# Patient Record
Sex: FEMALE | Race: WHITE | NOT HISPANIC OR LATINO | Employment: OTHER | URBAN - METROPOLITAN AREA
[De-identification: names, ages, dates, MRNs, and addresses within clinical notes are randomized per-mention and may not be internally consistent; named-entity substitution may affect disease eponyms.]

---

## 2017-12-31 ENCOUNTER — APPOINTMENT (OUTPATIENT)
Dept: CT IMAGING | Facility: CLINIC | Age: 72
End: 2017-12-31
Attending: EMERGENCY MEDICINE
Payer: MEDICARE

## 2017-12-31 ENCOUNTER — HOSPITAL ENCOUNTER (EMERGENCY)
Facility: CLINIC | Age: 72
Discharge: HOME | End: 2017-12-31
Attending: EMERGENCY MEDICINE | Admitting: EMERGENCY MEDICINE
Payer: MEDICARE

## 2017-12-31 ENCOUNTER — APPOINTMENT (OUTPATIENT)
Dept: GENERAL RADIOLOGY | Facility: CLINIC | Age: 72
End: 2017-12-31
Attending: EMERGENCY MEDICINE
Payer: MEDICARE

## 2017-12-31 VITALS
HEIGHT: 63 IN | OXYGEN SATURATION: 97 % | WEIGHT: 127 LBS | DIASTOLIC BLOOD PRESSURE: 71 MMHG | RESPIRATION RATE: 16 BRPM | HEART RATE: 92 BPM | TEMPERATURE: 99.1 F | SYSTOLIC BLOOD PRESSURE: 125 MMHG | BODY MASS INDEX: 22.5 KG/M2

## 2017-12-31 DIAGNOSIS — R63.0 POOR APPETITE: ICD-10-CM

## 2017-12-31 DIAGNOSIS — M62.81 GENERALIZED MUSCLE WEAKNESS: ICD-10-CM

## 2017-12-31 DIAGNOSIS — R41.0 CONFUSION: ICD-10-CM

## 2017-12-31 LAB
ALBUMIN UR-MCNC: 100 MG/DL
ANION GAP SERPL CALCULATED.3IONS-SCNC: 6 MMOL/L (ref 3–14)
APPEARANCE UR: CLEAR
BACTERIA #/AREA URNS HPF: ABNORMAL /HPF
BASOPHILS # BLD AUTO: 0 10E9/L (ref 0–0.2)
BASOPHILS NFR BLD AUTO: 0.3 %
BILIRUB UR QL STRIP: ABNORMAL
BUN SERPL-MCNC: 20 MG/DL (ref 7–30)
CALCIUM SERPL-MCNC: 9.1 MG/DL (ref 8.5–10.1)
CHLORIDE SERPL-SCNC: 101 MMOL/L (ref 94–109)
CO2 SERPL-SCNC: 28 MMOL/L (ref 20–32)
COLOR UR AUTO: YELLOW
CREAT SERPL-MCNC: 0.77 MG/DL (ref 0.52–1.04)
DIFFERENTIAL METHOD BLD: ABNORMAL
EOSINOPHIL # BLD AUTO: 0 10E9/L (ref 0–0.7)
EOSINOPHIL NFR BLD AUTO: 0.3 %
ERYTHROCYTE [DISTWIDTH] IN BLOOD BY AUTOMATED COUNT: 13.7 % (ref 10–15)
GFR SERPL CREATININE-BSD FRML MDRD: 74 ML/MIN/1.7M2
GLUCOSE SERPL-MCNC: 98 MG/DL (ref 70–99)
GLUCOSE UR STRIP-MCNC: NEGATIVE MG/DL
HCT VFR BLD AUTO: 38.1 % (ref 35–47)
HGB BLD-MCNC: 12.9 G/DL (ref 11.7–15.7)
HGB UR QL STRIP: NEGATIVE
IMM GRANULOCYTES # BLD: 0 10E9/L (ref 0–0.4)
IMM GRANULOCYTES NFR BLD: 0.6 %
INTERPRETATION ECG - MUSE: NORMAL
KETONES UR STRIP-MCNC: ABNORMAL MG/DL
LEUKOCYTE ESTERASE UR QL STRIP: NEGATIVE
LYMPHOCYTES # BLD AUTO: 0.3 10E9/L (ref 0.8–5.3)
LYMPHOCYTES NFR BLD AUTO: 8.4 %
MCH RBC QN AUTO: 30.6 PG (ref 26.5–33)
MCHC RBC AUTO-ENTMCNC: 33.9 G/DL (ref 31.5–36.5)
MCV RBC AUTO: 90 FL (ref 78–100)
MONOCYTES # BLD AUTO: 0.3 10E9/L (ref 0–1.3)
MONOCYTES NFR BLD AUTO: 7 %
NEUTROPHILS # BLD AUTO: 3 10E9/L (ref 1.6–8.3)
NEUTROPHILS NFR BLD AUTO: 83.4 %
NITRATE UR QL: NEGATIVE
NON-SQ EPI CELLS #/AREA URNS LPF: ABNORMAL /LPF
NRBC # BLD AUTO: 0 10*3/UL
NRBC BLD AUTO-RTO: 0 /100
PH UR STRIP: 6.5 PH (ref 5–7)
PLATELET # BLD AUTO: 256 10E9/L (ref 150–450)
POTASSIUM SERPL-SCNC: 4.2 MMOL/L (ref 3.4–5.3)
RBC # BLD AUTO: 4.22 10E12/L (ref 3.8–5.2)
RBC #/AREA URNS AUTO: ABNORMAL /HPF
SODIUM SERPL-SCNC: 135 MMOL/L (ref 133–144)
SOURCE: ABNORMAL
SP GR UR STRIP: 1.02 (ref 1–1.03)
TROPONIN I SERPL-MCNC: <0.015 UG/L (ref 0–0.04)
TSH SERPL DL<=0.005 MIU/L-ACNC: 2.62 MU/L (ref 0.4–4)
UROBILINOGEN UR STRIP-ACNC: >=8 EU/DL (ref 0.2–1)
WBC # BLD AUTO: 3.6 10E9/L (ref 4–11)
WBC #/AREA URNS AUTO: ABNORMAL /HPF

## 2017-12-31 PROCEDURE — 25000128 H RX IP 250 OP 636: Performed by: EMERGENCY MEDICINE

## 2017-12-31 PROCEDURE — 80048 BASIC METABOLIC PNL TOTAL CA: CPT | Performed by: EMERGENCY MEDICINE

## 2017-12-31 PROCEDURE — 99285 EMERGENCY DEPT VISIT HI MDM: CPT | Mod: 25

## 2017-12-31 PROCEDURE — 84484 ASSAY OF TROPONIN QUANT: CPT | Performed by: EMERGENCY MEDICINE

## 2017-12-31 PROCEDURE — 84443 ASSAY THYROID STIM HORMONE: CPT | Performed by: EMERGENCY MEDICINE

## 2017-12-31 PROCEDURE — 70450 CT HEAD/BRAIN W/O DYE: CPT

## 2017-12-31 PROCEDURE — 81001 URINALYSIS AUTO W/SCOPE: CPT | Performed by: EMERGENCY MEDICINE

## 2017-12-31 PROCEDURE — 71020 XR CHEST 2 VW: CPT

## 2017-12-31 PROCEDURE — 96360 HYDRATION IV INFUSION INIT: CPT

## 2017-12-31 PROCEDURE — 93005 ELECTROCARDIOGRAM TRACING: CPT

## 2017-12-31 PROCEDURE — 85025 COMPLETE CBC W/AUTO DIFF WBC: CPT | Performed by: EMERGENCY MEDICINE

## 2017-12-31 RX ADMIN — SODIUM CHLORIDE 500 ML: 9 INJECTION, SOLUTION INTRAVENOUS at 16:06

## 2017-12-31 ASSESSMENT — ENCOUNTER SYMPTOMS
NAUSEA: 0
RHINORRHEA: 0
CONSTIPATION: 1
FATIGUE: 1
BLOOD IN STOOL: 0
VOMITING: 0
NERVOUS/ANXIOUS: 1
SORE THROAT: 0
COUGH: 0
ABDOMINAL PAIN: 0
APPETITE CHANGE: 1
CONFUSION: 1

## 2017-12-31 NOTE — ED AVS SNAPSHOT
Emergency Department    [address] [place] [id number]    Phone:  [phone]    Fax:  [phone]                                       Paige Mercado   MRN: [id number]    Department:   Emergency Department   Date of Visit:  12/31/2017           After Visit Summary Signature Page     I have received my discharge instructions, and my questions have been answered. I have discussed any challenges I see with this plan with the nurse or doctor.    ..........................................................................................................................................  Patient/Patient Representative Signature      ..........................................................................................................................................  Patient Representative Print Name and Relationship to Patient    ..................................................               ................................................  Date                                            Time    ..........................................................................................................................................  Reviewed by Signature/Title    ...................................................              ..............................................  Date                                                            Time

## 2017-12-31 NOTE — DISCHARGE INSTRUCTIONS
"Please see your PCP on Tuesday.  Eat and drink normally.  Be sure to take all your medications as prescribed.    .  Coping with Memory Loss  What happens when you have memory loss?  Memory loss causes problems with thinking, learning and memory. You may feel forgetful or have trouble focusing on tasks.  Memory loss may be a side effect of medicine, chemotherapy or radiation. In these cases, problems with memory may improve after you finish your treatment. But you could have long-term problems.  Other factors that affect memory and your ability to focus include:    Aging    Illness    Depression    Hormonal changes    Anemia (low red blood cells)    Stress.  What can I do to treat or prevent memory loss?  Problems with thinking and memory can be very frustrating. There is no standard treatment at this time. But there are things you can do to reduce the effects of memory loss:    Really pay attention. Use your eyes, ears and sense of touch to remember things. Focus when someone tells you something.    Limit distractions when you need to complete tasks that require you to focus.    Tell yourself what you are doing as you do it. For example, if you're going out for a few hours, as you close the door tell yourself, \"I'm locking the door now and putting the key in my pocket so I don't have to worry about whether I locked the door.\"    Give yourself clear reminders. If you need to buy dog food, put the empty bag at the door so you'll see it as you leave the house. Or write yourself a note and put it where it's needed.    Keep things in the same place. This way you don't have to wonder where you put your keys, remote control or medicine.    Keep a journal of daily events and activities. Carry a notebook and write down important information that you want to remember.    Use helpful tools, such as:    A daily planner    A wall calendar    Checklists    Sticky notes    A  near the door    A pre-programmed phone    A " wristwatch with an alarm    A pill box to keep track of your medicines.    Get plenty of sleep and exercise each day.    Stay positive, and try to manage stress.    If you think you may be depressed, talk to your doctor. Depression should be treated.  When should I call my care team?  Call your care team if:    You feel depressed.    You cannot complete basic daily activities.  Comments:  __________________________________________  __________________________________________  __________________________________________  __________________________________________  __________________________________________  __________________________________________  __________________________________________  For informational purposes only. Not to replace the advice of your health care provider.  Copyright   2006 [facility]. All rights reserved. [employer] 470815 - REV 03/16.      Altered Level of Consciousness  Level of consciousness (LOC) is a measure of a person s ability to interact with other people and to react to the surroundings. A person with an altered level of consciousness may not respond to touch or voices. He or she may look vacant or blank and may not make eye contact with others. He or she may be limp and may not move for a long time or show little interest in moving. He or she may also be confused.  There are many causes of altered LOC. They include low blood sugar, infection, medicines, injuries, or other medical problems.  Altered LOC is a medical emergency. The healthcare provider will do tests to help find the cause. These may include blood tests and imaging tests. The person is treated so breathing and heart rate are stable. An intravenous (IV) line may be put into a vein in the arm or hand to give medicines. Once the cause of altered LOC is found, the goal is to treat the cause.  In almost all cases, the person will be admitted to the hospital for observation.  Home care  When your loved one is  released from the hospital, you will be given guidelines for caring for him or her. In general:    Follow the healthcare provider's instructions for giving any prescribed medicines to your child.    Stay with your loved one or have another responsible adult look after him or her. Watch carefully for any return of symptoms or changes in behavior.    If the person has diabetes, make sure that any approved medicines are given on time and as prescribed.  Follow-up care  Follow up with the healthcare provider or our staff.  When to seek medical advice  Call the healthcare provider right away for any of the following:    Symptoms of altered LOC return    New symptoms appear  Date Last Reviewed: 8/23/2015 2000-2017 [employer]. [address] [place] [address]. All rights reserved. This information is not intended as a substitute for professional medical care. Always follow your healthcare professional's instructions.        Navelbine  Generic Name: Vinorelbine  Drug type   Navelbine works to stop cancer cells from growing and helps get rid of the cancer.  What this drug is used for   Non-small cell lung cancer (NSCLC), breast cancer, ovarian cancer, or Hodgkin's disease.   How this drug is given   This drug is given through an IV line, a small tube inserted into a vein. Please let your nurse know right away if you feel pain, discomfort, or heat during the infusion; or if you see redness or swelling on the skin where the IV is placed.  The amount of medicine that you receive depends on many things. Your doctor will decide on the best dose for you.  Make sure your health care team knows about all the medicines and supplements you take. This will help prevent drug interactions.   Side effects   Most people do not have all the side effects listed. Side effects will usually go away after the treatment is complete. Some of the rare side effects are not listed here, so tell your doctor if you have any  unusual symptoms.  Common side effects   (occur in more than 3 out of 10 people):    Low red cells in blood (shortness of breath, feeling weak or dizzy)    Low white cells in blood (infection or fever)    Muscle weakness    Hard, dry stool (constipation)    Feeling tired    Hair loss    Throwing up (vomiting)    Feeling sick to your stomach (nausea)  Less common side effects   (occur in less than 3 out of 10 people):    Pain along the site where the medicine was given    Numbness in your fingers and toes    Loose, watery stools (diarrhea)    Low platelets in blood (bleed and bruise easily)    Chest pain  Call your doctor right away if you have:    Signs of an allergic reaction: Breathing problems, feel like your throat is closing up, swelling of the mouth, face, lips or tongue, or hives (red, itchy blotches on the skin).    100.5 F or higher or chills    Fainting or dizziness  Call your doctor within 24 hours if you have:    Loose or watery stools 4 to 6 times in a day (not controlled by medicine)    Nausea that makes it hard to eat (not helped by prescribed medicine)    Vomiting more than 5 times a day    New or worsening pain in the belly    Tiredness that prevents you from caring for yourself    Black or tarry stools or blood in your stools    Bloody or dark urine    Bleeding or bruising that happen more easily than normal    Mouth sores  Other information: __________________________  _________________________________________  _________________________________________  If you have any side effects, call us. We can help you manage these problems.  For more information, see:  www.[employer]oc[employer].com  www.medlineplus.gov/druginformation.htm  http://www.cancer.gov/cancertopics/coping/chemotherapy-and-you  For informational purposes only. Not to replace the advice of your health care provider.   Copyright   2016 [employer]. All rights reserved. [employer] [id number] - 11/16.

## 2017-12-31 NOTE — ED NOTES
Pt did not tell nurse about mastectomy prior to IV start so started on same side. When nurse saw that pt had had one, changed sides. Pt stated she forgot about it and couldn't remember which side she had it on. Limb alert band placed.

## 2017-12-31 NOTE — ED AVS SNAPSHOT
Emergency Department    [address] [place] [id number]    Phone:  [phone]    Fax:  [phone]                                       Paige Mercado   MRN: [id number]    Department:   Emergency Department   Date of Visit:  12/31/2017           Patient Information     Date Of Birth          1945        Your diagnoses for this visit were:     Generalized muscle weakness     Confusion     Poor appetite        You were seen by Melissa Beasley MD.      Follow-up Information     Follow up with Clinic, [facility]. Schedule an appointment as soon as possible for a visit in 2 days.    Contact information:    [address] [place] [id number] [phone]          Discharge Instructions       Please see your PCP on Tuesday.  Eat and drink normally.  Be sure to take all your medications as prescribed.    .  Coping with Memory Loss  What happens when you have memory loss?  Memory loss causes problems with thinking, learning and memory. You may feel forgetful or have trouble focusing on tasks.  Memory loss may be a side effect of medicine, chemotherapy or radiation. In these cases, problems with memory may improve after you finish your treatment. But you could have long-term problems.  Other factors that affect memory and your ability to focus include:    Aging    Illness    Depression    Hormonal changes    Anemia (low red blood cells)    Stress.  What can I do to treat or prevent memory loss?  Problems with thinking and memory can be very frustrating. There is no standard treatment at this time. But there are things you can do to reduce the effects of memory loss:    Really pay attention. Use your eyes, ears and sense of touch to remember things. Focus when someone tells you something.    Limit distractions when you need to complete tasks that require you to focus.    Tell yourself what you are doing as you do it. For example, if you're going out for a few hours, as you  "close the door tell yourself, \"I'm locking the door now and putting the key in my pocket so I don't have to worry about whether I locked the door.\"    Give yourself clear reminders. If you need to buy dog food, put the empty bag at the door so you'll see it as you leave the house. Or write yourself a note and put it where it's needed.    Keep things in the same place. This way you don't have to wonder where you put your keys, remote control or medicine.    Keep a journal of daily events and activities. Carry a notebook and write down important information that you want to remember.    Use helpful tools, such as:    A daily planner    A wall calendar    Checklists    Sticky notes    A  near the door    A pre-programmed phone    A wristwatch with an alarm    A pill box to keep track of your medicines.    Get plenty of sleep and exercise each day.    Stay positive, and try to manage stress.    If you think you may be depressed, talk to your doctor. Depression should be treated.  When should I call my care team?  Call your care team if:    You feel depressed.    You cannot complete basic daily activities.  Comments:  __________________________________________  __________________________________________  __________________________________________  __________________________________________  __________________________________________  __________________________________________  __________________________________________  For informational purposes only. Not to replace the advice of your health care provider.  Copyright   2006 [facility]. All rights reserved. [employer] 551490 - REV 03/16.      Altered Level of Consciousness  Level of consciousness (LOC) is a measure of a person s ability to interact with other people and to react to the surroundings. A person with an altered level of consciousness may not respond to touch or voices. He or she may look vacant or blank and may not make eye contact " with others. He or she may be limp and may not move for a long time or show little interest in moving. He or she may also be confused.  There are many causes of altered LOC. They include low blood sugar, infection, medicines, injuries, or other medical problems.  Altered LOC is a medical emergency. The healthcare provider will do tests to help find the cause. These may include blood tests and imaging tests. The person is treated so breathing and heart rate are stable. An intravenous (IV) line may be put into a vein in the arm or hand to give medicines. Once the cause of altered LOC is found, the goal is to treat the cause.  In almost all cases, the person will be admitted to the hospital for observation.  Home care  When your loved one is released from the hospital, you will be given guidelines for caring for him or her. In general:    Follow the healthcare provider's instructions for giving any prescribed medicines to your child.    Stay with your loved one or have another responsible adult look after him or her. Watch carefully for any return of symptoms or changes in behavior.    If the person has diabetes, make sure that any approved medicines are given on time and as prescribed.  Follow-up care  Follow up with the healthcare provider or our staff.  When to seek medical advice  Call the healthcare provider right away for any of the following:    Symptoms of altered LOC return    New symptoms appear  Date Last Reviewed: 8/23/2015 2000-2017 [employer]. [address] [place] [id number]. All rights reserved. This information is not intended as a substitute for professional medical care. Always follow your healthcare professional's instructions.        Navelbine  Generic Name: Vinorelbine  Drug type   Navelbine works to stop cancer cells from growing and helps get rid of the cancer.  What this drug is used for   Non-small cell lung cancer (NSCLC), breast cancer, ovarian cancer, or Hodgkin's  disease.   How this drug is given   This drug is given through an IV line, a small tube inserted into a vein. Please let your nurse know right away if you feel pain, discomfort, or heat during the infusion; or if you see redness or swelling on the skin where the IV is placed.  The amount of medicine that you receive depends on many things. Your doctor will decide on the best dose for you.  Make sure your health care team knows about all the medicines and supplements you take. This will help prevent drug interactions.   Side effects   Most people do not have all the side effects listed. Side effects will usually go away after the treatment is complete. Some of the rare side effects are not listed here, so tell your doctor if you have any unusual symptoms.  Common side effects   (occur in more than 3 out of 10 people):    Low red cells in blood (shortness of breath, feeling weak or dizzy)    Low white cells in blood (infection or fever)    Muscle weakness    Hard, dry stool (constipation)    Feeling tired    Hair loss    Throwing up (vomiting)    Feeling sick to your stomach (nausea)  Less common side effects   (occur in less than 3 out of 10 people):    Pain along the site where the medicine was given    Numbness in your fingers and toes    Loose, watery stools (diarrhea)    Low platelets in blood (bleed and bruise easily)    Chest pain  Call your doctor right away if you have:    Signs of an allergic reaction: Breathing problems, feel like your throat is closing up, swelling of the mouth, face, lips or tongue, or hives (red, itchy blotches on the skin).    100.5 F or higher or chills    Fainting or dizziness  Call your doctor within 24 hours if you have:    Loose or watery stools 4 to 6 times in a day (not controlled by medicine)    Nausea that makes it hard to eat (not helped by prescribed medicine)    Vomiting more than 5 times a day    New or worsening pain in the belly    Tiredness that prevents you from caring  for yourself    Black or tarry stools or blood in your stools    Bloody or dark urine    Bleeding or bruising that happen more easily than normal    Mouth sores  Other information: __________________________  _________________________________________  _________________________________________  If you have any side effects, call us. We can help you manage these problems.  For more information, see:  www.chemocare.com  www.medlineplus.gov/druginformation.htm  http://www.cancer.gov/cancertopics/coping/chemotherapy-and-you  For informational purposes only. Not to replace the advice of your health care provider.   Copyright   2016 [facility]. All rights reserved. [employer] 385841 - 11/16.      24 Hour Appointment Hotline       To make an appointment at any [facility] clinic, call [phone] ([phone]). If you don't have a family doctor or clinic, we will help you find one. [facility] clinics are conveniently located to serve the needs of you and your family.             Review of your medicines      Our records show that you are taking the medicines listed below. If these are incorrect, please call your family doctor or clinic.        Dose / Directions Last dose taken    ASPIRIN LOW DOSE 81 MG tablet   Dose:  1 tablet   Generic drug:  aspirin        Take 1 tablet by mouth daily.   Refills:  0        celeXA 40 MG tablet   Dose:  40 mg   Generic drug:  citalopram        Take 40 mg by mouth daily.   Refills:  0        simvastatin 20 MG tablet   Commonly known as:  ZOCOR   Dose:  20 mg        Take 20 mg by mouth At Bedtime.   Refills:  0        SYNTHROID 50 MCG tablet   Dose:  50 mcg   Generic drug:  levothyroxine        Take 50 mcg by mouth daily.   Refills:  0        vitamin D 2000 UNITS Caps        Take  by mouth.   Refills:  0                Procedures and tests performed during your visit     Basic metabolic panel    CBC with platelets differential    CT Head w/o Contrast    EKG 12-lead, tracing  only    TSH with free T4 reflex    Troponin I    UA reflex to Microscopic and Culture    Urine Microscopic    XR Chest 2 Views      Orders Needing Specimen Collection     None      Pending Results     Date and Time Order Name Status Description    12/31/2017 1532 CT Head w/o Contrast Preliminary     12/31/2017 1532 EKG 12-lead, tracing only Preliminary             Pending Culture Results     No orders found from 12/29/2017 to 1/1/2018.            Pending Results Instructions     If you had any lab results that were not finalized at the time of your Discharge, you can call the ED Lab Result RN at [phone]. You will be contacted by this team for any positive Lab results or changes in treatment. The nurses are available 7 days a week from 10A to 6:30P.  You can leave a message 24 hours per day and they will return your call.        Test Results From Your Hospital Stay        12/31/2017  4:03 PM      Component Results     Component Value Ref Range & Units Status    WBC 3.6 (L) 4.0 - 11.0 10e9/L Final    RBC Count 4.22 3.8 - 5.2 10e12/L Final    Hemoglobin 12.9 11.7 - 15.7 g/dL Final    Hematocrit 38.1 35.0 - 47.0 % Final    MCV 90 78 - 100 fl Final    MCH 30.6 26.5 - 33.0 pg Final    MCHC 33.9 31.5 - 36.5 g/dL Final    RDW 13.7 10.0 - 15.0 % Final    Platelet Count 256 150 - 450 10e9/L Final    Diff Method Automated Method  Final    % Neutrophils 83.4 % Final    % Lymphocytes 8.4 % Final    % Monocytes 7.0 % Final    % Eosinophils 0.3 % Final    % Basophils 0.3 % Final    % Immature Granulocytes 0.6 % Final    Nucleated RBCs 0 0 /100 Final    Absolute Neutrophil 3.0 1.6 - 8.3 10e9/L Final    Absolute Lymphocytes 0.3 (L) 0.8 - 5.3 10e9/L Final    Absolute Monocytes 0.3 0.0 - 1.3 10e9/L Final    Absolute Eosinophils 0.0 0.0 - 0.7 10e9/L Final    Absolute Basophils 0.0 0.0 - 0.2 10e9/L Final    Abs Immature Granulocytes 0.0 0 - 0.4 10e9/L Final    Absolute Nucleated RBC 0.0  Final         12/31/2017  4:16 PM       Component Results     Component Value Ref Range & Units Status    Sodium 135 133 - 144 mmol/L Final    Potassium 4.2 3.4 - 5.3 mmol/L Final    Chloride 101 94 - 109 mmol/L Final    Carbon Dioxide 28 20 - 32 mmol/L Final    Anion Gap 6 3 - 14 mmol/L Final    Glucose 98 70 - 99 mg/dL Final    Urea Nitrogen 20 7 - 30 mg/dL Final    Creatinine 0.77 0.52 - 1.04 mg/dL Final    GFR Estimate 74 >60 mL/min/1.7m2 Final    Non  GFR Calc    GFR Estimate If Black 89 >60 mL/min/1.7m2 Final    African American GFR Calc    Calcium 9.1 8.5 - 10.1 mg/dL Final         12/31/2017  4:22 PM      Component Results     Component Value Ref Range & Units Status    Troponin I ES <0.015 0.000 - 0.045 ug/L Final    The 99th percentile for upper reference range is 0.045 ug/L.  Troponin values   in the range of 0.045 - 0.120 ug/L may be associated with risks of adverse   clinical events.           12/31/2017  4:08 PM      Narrative     CHEST TWO VIEWS   12/31/2017 3:54 PM     HISTORY: Confusion work up.     COMPARISON: None.        Impression     IMPRESSION: Cardiomediastinal silhouette within normal limits. No  pleural effusion. No pneumothorax identified. No focal airspace  opacities. The bones are unremarkable.    KALEE MARTINEZ MD         12/31/2017  4:01 PM      Narrative     CT OF THE HEAD WITHOUT CONTRAST 12/31/2017 3:50 PM     COMPARISON: None    HISTORY: Headache, confused.     TECHNIQUE: Axial CT images of the head from the skull base to the  vertex were acquired without IV contrast.    FINDINGS: The ventricles and basal cisterns are within normal limits  in configuration. There is no midline shift. There are no extra-axial  fluid collections. Gray-white differentiation is well maintained.    No intracranial hemorrhage, mass or recent infarct.    The visualized paranasal sinuses are well-aerated. There is no  mastoiditis. There are no fractures of the visualized bones.        Impression     IMPRESSION: Normal head  CT.      Radiation dose for this scan was reduced using automated exposure  control, adjustment of the mA and/or kV according to patient size, or  iterative reconstruction technique.         12/31/2017  4:26 PM      Component Results     Component Value Ref Range & Units Status    TSH 2.62 0.40 - 4.00 mU/L Final         12/31/2017  4:03 PM      Component Results     Component Value Ref Range & Units Status    Color Urine Yellow  Final    Appearance Urine Clear  Final    Glucose Urine Negative NEG^Negative mg/dL Final    Bilirubin Urine Small (A) NEG^Negative Final    This is an unconfirmed screening test result. A positive result may be false.    Ketones Urine Trace (A) NEG^Negative mg/dL Final    Specific Gravity Urine 1.025 1.003 - 1.035 Final    Blood Urine Negative NEG^Negative Final    pH Urine 6.5 5.0 - 7.0 pH Final    Protein Albumin Urine 100 (A) NEG^Negative mg/dL Final    Urobilinogen Urine >=8.0 0.2 - 1.0 EU/dL Final    Nitrite Urine Negative NEG^Negative Final    Leukocyte Esterase Urine Negative NEG^Negative Final    Source Midstream Urine  Final         12/31/2017  4:03 PM      Component Results     Component Value Ref Range & Units Status    WBC Urine O - 2 OTO2^O - 2 /HPF Final    RBC Urine O - 2 OTO2^O - 2 /HPF Final    Squamous Epithelial /LPF Urine Few FEW^Few /LPF Final    Bacteria Urine Few (A) NEG^Negative /HPF Final                Clinical Quality Measure: Blood Pressure Screening     Your blood pressure was checked while you were in the emergency department today. The last reading we obtained was  BP: 125/71 . Please read the guidelines below about what these numbers mean and what you should do about them.  If your systolic blood pressure (the top number) is less than 120 and your diastolic blood pressure (the bottom number) is less than 80, then your blood pressure is normal. There is nothing more that you need to do about it.  If your systolic blood pressure (the top number) is 120-139 or  "your diastolic blood pressure (the bottom number) is 80-89, your blood pressure may be higher than it should be. You should have your blood pressure rechecked within a year by a primary care provider.  If your systolic blood pressure (the top number) is 140 or greater or your diastolic blood pressure (the bottom number) is 90 or greater, you may have high blood pressure. High blood pressure is treatable, but if left untreated over time it can put you at risk for heart attack, stroke, or kidney failure. You should have your blood pressure rechecked by a primary care provider within the next 4 weeks.  If your provider in the emergency department today gave you specific instructions to follow-up with your doctor or provider even sooner than that, you should follow that instruction and not wait for up to 4 weeks for your follow-up visit.        Thank you for choosing [facility]       Thank you for choosing [facility] for your care. Our goal is always to provide you with excellent care. Hearing back from our patients is one way we can continue to improve our services. Please take a few minutes to complete the written survey that you may receive in the mail after you visit with us. Thank you!        [employer] Information     [employer] lets you send messages to your doctor, view your test results, renew your prescriptions, schedule appointments and more. To sign up, go to [email or web address] . Click on \"Log in\" on the left side of the screen, which will take you to the Welcome page. Then click on \"Sign up Now\" on the right side of the page.     You will be asked to enter the access code listed below, as well as some personal information. Please follow the directions to create your username and password.     Your access code is: [email or web address]  Expires: 3/31/2018  5:19 PM     Your access code will  in 90 days. If you need help or a new code, please call your [facility] clinic or [phone].        Care EveryWhere ID     " This is your Care EveryWhere ID. This could be used by other organizations to access your [facility] medical records  [id number]        Equal Access to Services     FERNANDA GARLAND : Jenna Guajardo, kishore menchaca, valerie zamora. So [facility] [phone].    ATENCIÓN: Si habla español, tiene a eller disposición servicios gratuitos de asistencia lingüística. Llame al [phone].    We comply with applicable federal civil rights laws and [place] laws. We do not discriminate on the basis of race, color, national origin, age, disability, sex, sexual orientation, or gender identity.            After Visit Summary       This is your record. Keep this with you and show to your community pharmacist(s) and doctor(s) at your next visit.

## 2017-12-31 NOTE — ED PROVIDER NOTES
History     Chief Complaint:  Anxiety     HPI   Paige Mercado is a 72 year old female, with a history of Parkinson's, who presents with her friend to the ED for evaluation of anxiety. The patient reports she has had confusion lately; she cannot retain information and has been asking more questions. The patient notes she has been feeling fatigued with granular bowel movements. The patient's friend reports the patient had a UTI 6 months ago which caused cognitive impairments, anxiety, and 40lbs weight loss; the patient's cognition has been improving the last 5 months, recovering about 75%. The friend notes the patient has been having increased anxiety, cognitive impairments, and decreased appetite again the last 2 days; the patient is anxious about her UTI which she has tried 4 different medications for. The patient denies any rhinorrhea, sore throat, cough, nausea, vomiting, abdominal pain, black/tarry stool, blood in stool, suicidal ideation, or homicidal ideation.    Allergies:  Ciprofloxacin  Sulfa drugs      Medications:    Levothyroxine  Aspirin 81mg  Vitamin D  Simvastatin  Celexa  Carbidopa/Levodopa   Nitrofurantoin     Past Medical History:    Parkinson's   HLD  Depression   Thyroid cancer     Past Surgical History:    Hysterectomy     Family History:    History reviewed. No pertinent family history.     Social History:  Smoking status: Never smoker  Alcohol use: No  Presents to ED with friend   Marital Status:  Unknown [6]     Review of Systems   Constitutional: Positive for appetite change and fatigue.   HENT: Negative for rhinorrhea and sore throat.    Respiratory: Negative for cough.    Gastrointestinal: Positive for constipation. Negative for abdominal pain, blood in stool, nausea and vomiting.   Psychiatric/Behavioral: Positive for confusion. Negative for suicidal ideas. The patient is nervous/anxious.    All other systems reviewed and are negative.    Physical Exam     Patient Vitals for the past 24  "hrs:   BP Temp Temp src Pulse Resp SpO2 Height Weight   12/31/17 1700 125/71 - - 92 - 97 % - -   12/31/17 1630 131/84 - - 89 16 99 % - -   12/31/17 1607 - - - - - 97 % - -   12/31/17 1606 123/72 - - - - - - -   12/31/17 1538 109/61 - - 91 16 - - -   12/31/17 1400 108/52 99.1  F (37.3  C) Temporal 102 16 97 % 1.6 m (5' 3\") 57.6 kg (127 lb)     Physical Exam  Physical Exam   General: Resting on the bed.  Head: No obvious trauma to head.  Ears, Nose, Throat:  External ears normal.  Nose normal.    Eyes:  Conjunctivae clear.  Pupils are equal, round, and reactive.   Neck: Normal range of motion.  Neck supple.   CV: Regular rate and rhythm.  No murmurs.      Respiratory: Effort normal and breath sounds normal.  No wheezing or crackles.   Gastrointestinal: Soft.  No distension. There is no tenderness.    Neuro: Alert. Moving all extremities appropriately.  Normal speech.  Slow gait but normal.  CN II-XII grossly intact, no pronator drift, normal finger-nose-finger.  Gross muscle strength intact of the proximal and distal bilateral upper and lower extremities.  Sensation intact to light touch in all 4 extremities.   Skin: Skin is warm and dry.  No rash noted.   Psych: Normal mood and affect. Behavior is normal.     Emergency Department Course     ECG (15:37:31):  Rate 93 bpm. WI interval 150. QRS duration 82. QT/QTc 382/474. P-R-T axes 32 -14 42. Normal sinus rhythm. Possible anterior infarct, age undetermined. Abnormal ECG. Interpreted at 1541 by Melissa Beasley MD.    Imaging:  Radiographic findings were communicated with the patient and friend who voiced understanding of the findings.    CT Head w/o Contrast  IMPRESSION: Normal head CT. As read by Radiology.     XR Chest 2 Views  IMPRESSION: Cardiomediastinal silhouette within normal limits. No  pleural effusion. No pneumothorax identified. No focal airspace  opacities. The bones are unremarkable. As read by Radiology.     Laboratory:  UA: Urinebilin small, " Urineketon trace, Protein albumin 100  Urine: Bacteria few  TSH: 2.62  Troponin I (1530): <0.015  CBC: WBC 3.6(L) o/w WNL (HGB 12.9, PLT 1256)  BMP: o/w WNL (Creatinine 0.77)    Interventions:  1606: NS 500mLs Bolus IV     Emergency Department Course:  Past medical records, nursing notes, and vitals reviewed.  1516: I performed an exam of the patient and obtained history, as documented above.  IV inserted and blood drawn.    The patient was sent for a head CT and chest x-ray while in the emergency department, findings above.    1704: I rechecked the patient. Findings and plan explained to the Patient and friend. Patient discharged home with instructions regarding supportive care, medications, and reasons to return. The importance of close follow-up was reviewed.     Impression & Plan      Medical Decision Making:  Paige Mercado is a 72 year old female with history of Parkinson's and dementia, presenting with transient confusion. Vital sings unremarkable. Broad differential pursued including but not limited to, intracranial hemorrhage, electrolyte or metabolic abnormality, infection, dehydration, hyper/hypothyroidism, etc. Overall, patient is well-appearing, non-toxic. She has a completely non-focal neurologic exam. She's able to ambulate slowly at baseline. Head CT is obtained showing no acute intracranial process or hemorrhage. Chest x-ray negative for any acute pneumonia, pneumothorax, or other acute abnormalities. CBC shows mild leukopenia 3.6, but remainder unremarkable and no evidence of anemia. BMP without any acute electrolyte, metabolic, or renal abnormalities. EKG was obtained and shows sinus rhythm, no acute ST/T wave changes, troponin is normal. Overall, very low concern for acute coronary syndrome. TSH is within normal range, not concerning for hyper/hypothyroidism. Patient's urine sample is bland, no evidence of infection on today's examination. She is on antibiotics at present. Advised continuation of  this. It appears that she had very poor PO intake previously. She has not been eating or drinking normally. Suspect this likely contributed to today's presentation. She is at baseline currently. There's no signs of stroke or TIA during this confusion. She has no other focal or reversible signs of confusion at this time. She feels well and would like to go home. This appears to be appropriate. Advised very close follow-up. Her friend and daughter will watch her closely. She's discharged in stable condition.     Diagnosis:    ICD-10-CM   1. Generalized muscle weakness M62.81   2. Confusion R41.0   3. Poor appetite R63.0     Disposition: Patient discharged to home with friend     Lesley Soni  12/31/2017    EMERGENCY DEPARTMENT    I, Lesley Soni, am serving as a scribe at 3:16 PM on 12/31/2017 to document services personally performed by Melissa Beasley MD based on my observations and the provider's statements to me.        Melissa Beasley MD  12/31/17 2015

## 2018-01-02 NOTE — TELEPHONE ENCOUNTER
APPT INFO    Date /Time: 3/15/18   Reason for Appt: Parkinsons   Ref Provider/Clinic: Self   Are there internal records? Yes/No?  IF YES, list clinic names: No   Are there outside records? Yes/No? Yes;  See Below   Patient Contact (Y/N) & Call Details: Yes, transfer from call center   Action: Mailed YADY to pt home for [facility] for records     OUTSIDE RECORDS CHECKLIST     CLINIC NAME COMMENTS REC (x) IMG (x)   [facility]  (CARE EVERYWHERE)  OV NOTES:    - 2012 through 2017    - 2012 Speech Therapy/ OT X NONE

## 2018-01-03 ENCOUNTER — PRE VISIT (OUTPATIENT)
Dept: UROLOGY | Facility: CLINIC | Age: 73
End: 2018-01-03

## 2018-01-24 ENCOUNTER — AMBULATORY - HEALTHEAST (OUTPATIENT)
Dept: NEUROLOGY | Facility: CLINIC | Age: 73
End: 2018-01-24

## 2018-01-24 DIAGNOSIS — G20.A1 PD (PARKINSON'S DISEASE) (H): ICD-10-CM

## 2018-01-30 ENCOUNTER — PRE VISIT (OUTPATIENT)
Dept: UROLOGY | Facility: CLINIC | Age: 73
End: 2018-01-30

## 2018-01-30 NOTE — TELEPHONE ENCOUNTER
Patient coming in for a consult for frequent UTI's. Chart reviewed and some cultures are available. Pt will need a catheterized pvr/dip.

## 2018-02-08 ENCOUNTER — OFFICE VISIT (OUTPATIENT)
Dept: UROLOGY | Facility: CLINIC | Age: 73
End: 2018-02-08
Payer: COMMERCIAL

## 2018-02-08 VITALS
DIASTOLIC BLOOD PRESSURE: 91 MMHG | WEIGHT: 131.9 LBS | SYSTOLIC BLOOD PRESSURE: 150 MMHG | BODY MASS INDEX: 23.37 KG/M2 | HEART RATE: 93 BPM | HEIGHT: 63 IN

## 2018-02-08 DIAGNOSIS — N39.0 RECURRENT UTI: ICD-10-CM

## 2018-02-08 DIAGNOSIS — G20.A1 PARKINSON'S DISEASE (H): ICD-10-CM

## 2018-02-08 DIAGNOSIS — N39.0 RECURRENT UTI: Primary | ICD-10-CM

## 2018-02-08 LAB
ANION GAP SERPL CALCULATED.3IONS-SCNC: 5 MMOL/L (ref 3–14)
APPEARANCE UR: CLEAR
BILIRUB UR QL: NORMAL
BUN SERPL-MCNC: 14 MG/DL (ref 7–30)
CALCIUM SERPL-MCNC: 8.9 MG/DL (ref 8.5–10.1)
CHLORIDE SERPL-SCNC: 104 MMOL/L (ref 94–109)
CO2 SERPL-SCNC: 29 MMOL/L (ref 20–32)
COLOR UR: YELLOW
CREAT SERPL-MCNC: 0.61 MG/DL (ref 0.52–1.04)
GFR SERPL CREATININE-BSD FRML MDRD: >90 ML/MIN/1.7M2
GLUCOSE SERPL-MCNC: 88 MG/DL (ref 70–99)
GLUCOSE URINE: NORMAL MG/DL
HGB UR QL: NORMAL
KETONES UR QL: NORMAL MG/DL
LEUKOCYTE ESTERASE URINE: NORMAL
NITRITE UR QL STRIP: NORMAL
PH UR STRIP: 7 PH (ref 5–7)
POTASSIUM SERPL-SCNC: 3.9 MMOL/L (ref 3.4–5.3)
PROTEIN ALBUMIN URINE: NORMAL MG/DL
SODIUM SERPL-SCNC: 137 MMOL/L (ref 133–144)
SOURCE: NORMAL
SP GR UR STRIP: 1.01 (ref 1–1.03)
UROBILINOGEN UR QL STRIP: 0.2 EU/DL (ref 0.2–1)

## 2018-02-08 RX ORDER — QUETIAPINE FUMARATE 100 MG/1
100 TABLET, FILM COATED ORAL
COMMUNITY
Start: 2017-12-12 | End: 2018-03-11

## 2018-02-08 RX ORDER — POLYETHYLENE GLYCOL 3350 17 G/17G
17 POWDER, FOR SOLUTION ORAL
COMMUNITY
End: 2018-03-15

## 2018-02-08 RX ORDER — ACETAMINOPHEN 500 MG
500 TABLET ORAL
COMMUNITY
End: 2018-03-15

## 2018-02-08 RX ORDER — CARBIDOPA AND LEVODOPA 25; 100 MG/1; MG/1
1 TABLET, EXTENDED RELEASE ORAL
COMMUNITY
Start: 2016-02-10 | End: 2018-03-15

## 2018-02-08 RX ORDER — QUETIAPINE FUMARATE 25 MG/1
TABLET, FILM COATED ORAL
COMMUNITY
Start: 2018-01-16 | End: 2018-03-15

## 2018-02-08 RX ORDER — ROPINIROLE 1 MG/1
1 TABLET, FILM COATED ORAL
COMMUNITY
Start: 2017-03-07 | End: 2018-03-11

## 2018-02-08 RX ORDER — LISINOPRIL 2.5 MG/1
2.5 TABLET ORAL
COMMUNITY
Start: 2017-11-28 | End: 2018-03-15

## 2018-02-08 RX ORDER — SUMATRIPTAN 25 MG/1
25 TABLET, FILM COATED ORAL
COMMUNITY
Start: 2013-05-09 | End: 2018-03-15

## 2018-02-08 ASSESSMENT — PAIN SCALES - GENERAL: PAINLEVEL: NO PAIN (0)

## 2018-02-08 NOTE — LETTER
2/8/2018       RE: Paige Mercado  [address] [address]     Dear Colleague,    Thank you for referring your patient, Paige Mercado, to the [facility] UROLOGY AND INST FOR PROSTATE AND UROLOGIC CANCERS at [facility] Please see a copy of my visit note below.    February 8, 2018    Referring Provider: Martha Christie MD    Primary Care Provider: Clinic, [facility]    CC: Recurrent UTI    HPI:  Paige Mercado is a 72 year old female who presents for evaluation of her pelvic floor symptoms.  Patient is accompanied by her friend Ms Lujan who is also a patient of mine.  She brings her friend in today to discuss Ricco.    She has an history of Parkinson's disease and has had some episodes of UTI and confusion.  With each infection she seems to continue to have some cognitive decline and now some significant anxiety about this.   Ms Lujan brings her friend in here today for further evaluation to see if we can help prevent UTI    She has had E coli in her bladder 5/2017, 12/18/2017 and 12/26/2017    Hysterectomy for fibroids, history of breast cancer    Past Medical History:   Diagnosis Date     Cancer (H)     breast     Past Surgical History:   Procedure Laterality Date     GYN SURGERY      hysterectomy     Social History     Social History     Marital status: Unknown     Spouse name: N/A     Number of children: N/A     Years of education: N/A     Occupational History     Not on file.     Social History Main Topics     Smoking status: Never Smoker     Smokeless tobacco: Never Used     Alcohol use No     Drug use: No     Sexual activity: Not on file     Other Topics Concern     Not on file     Social History Narrative     No family history on file.    ROS    Allergies   Allergen Reactions     Ciprofloxacin GI Disturbance     Sulfa Drugs GI Disturbance     Current Outpatient Prescriptions   Medication     carbidopa-levodopa (SINEMET CR)  MG per CR  "tablet     lisinopril (PRINIVIL/ZESTRIL) 2.5 MG tablet     QUEtiapine (SEROQUEL) 100 MG tablet     QUEtiapine (SEROQUEL) 25 MG tablet     rOPINIRole (REQUIP) 1 MG tablet     SUMAtriptan (IMITREX) 25 MG tablet     acetaminophen (TYLENOL) 500 MG tablet     polyethylene glycol (MIRALAX/GLYCOLAX) Packet     levothyroxine (SYNTHROID) 50 MCG tablet     aspirin (ASPIRIN LOW DOSE) 81 MG tablet     Cholecalciferol (VITAMIN D) 2000 UNIT CAPS     simvastatin (ZOCOR) 20 MG tablet     citalopram (CELEXA) 40 MG tablet     No current facility-administered medications for this visit.      BP (!) 150/91  Pulse 93  Ht 1.6 m (5' 3\")  Wt 59.8 kg (131 lb 14.4 oz)  BMI 23.37 kg/m2 No LMP recorded. Patient has had a hysterectomy. Body mass index is 23.37 kg/(m^2).  She is alert, comfortable in no acute distress, non-labored breathing.  Exam deferred until time of cystoscopy  Urine dip negative    PVR 15 mL by bladder scan    A/P: Paige Mercado is a 72 year old F with Parkinson's disease, history of cognitive decline, Ricco    Discussed evaluation to include CT scan and return for cystoscopy, pelvic exam    Discussed available supplements    Discussed the importance of contacting our clinic for urine specimen if concern for infection, preferably for CATHETERIZED urine    RTC for cystoscopy after CT scan complete    30 minutes were spent with the patient today, > 50% in counseling and coordination of care    Alyson Mcgee MD MPH    Urology    CC  Patient Care Team:  [facility], [facility] as PCP - General  Everardo, Alyson Mariee MD as MD (Urology)  Trixie Aldridge, RN as Registered Nurse (Urology)  SELF, REFERRED    "

## 2018-02-08 NOTE — NURSING NOTE
"Chief Complaint   Patient presents with     Consult     Frequent UTI's       Blood pressure (!) 150/91, pulse 93, height 1.6 m (5' 3\"), weight 59.8 kg (131 lb 14.4 oz). Body mass index is 23.37 kg/(m^2).    There is no problem list on file for this patient.      Allergies   Allergen Reactions     Ciprofloxacin GI Disturbance     Sulfa Drugs GI Disturbance       Current Outpatient Prescriptions   Medication Sig Dispense Refill     carbidopa-levodopa (SINEMET CR)  MG per CR tablet Take 1 tablet by mouth       lisinopril (PRINIVIL/ZESTRIL) 2.5 MG tablet Take 2.5 mg by mouth       QUEtiapine (SEROQUEL) 100 MG tablet Take 100 mg by mouth       QUEtiapine (SEROQUEL) 25 MG tablet Take 1 tabs in the morning and 1 tab at bedtime. (takes with a 100 mg tab at hs). DOSE INCREASE.       rOPINIRole (REQUIP) 1 MG tablet Take 1 mg by mouth       SUMAtriptan (IMITREX) 25 MG tablet Take 25 mg by mouth       acetaminophen (TYLENOL) 500 MG tablet Take 500 mg by mouth       polyethylene glycol (MIRALAX/GLYCOLAX) Packet Take 17 g by mouth       levothyroxine (SYNTHROID) 50 MCG tablet Take 50 mcg by mouth daily.       aspirin (ASPIRIN LOW DOSE) 81 MG tablet Take 1 tablet by mouth daily.       Cholecalciferol (VITAMIN D) 2000 UNIT CAPS Take  by mouth.       simvastatin (ZOCOR) 20 MG tablet Take 20 mg by mouth At Bedtime.       citalopram (CELEXA) 40 MG tablet Take 40 mg by mouth daily.         Social History   Substance Use Topics     Smoking status: Never Smoker     Smokeless tobacco: Never Used     Alcohol use No       PAOLA Lovett  2/8/2018  2:50 PM       "

## 2018-02-08 NOTE — PATIENT INSTRUCTIONS
Please do the blood work and CT scan    Please return to see me for a cystoscopy (procedure to look in the bladder) and pelvic exam    Please come in one week before the cystoscopy for a nurse visit for a CATHETERIZED urine specimen    Supplements to prevent UTI  -probiotics  -cranberry: Ellura, Theracran  -d-mannose    If you think you have a urine infection please contact us [phone] to talk to a nurse about coming in for a catheterized specimen    It was a pleasure meeting with you today.  Thank you for allowing me and my team the privilege of caring for you today.  YOU are the reason we are here, and I truly hope we provided you with the excellent service you deserve.  Please let us know if there is anything else we can do for you so that we can be sure you are leaving completely satisfied with your care experience.

## 2018-02-08 NOTE — MR AVS SNAPSHOT
After Visit Summary   2/8/2018    Paige Mercado    MRN: [id number]           Patient Information     Date Of Birth          1945        Visit Information        Provider Department      2/8/2018 2:15 PM Alyson Mcgee MD [facility] Urology and [facility] for Prostate and Urologic Cancers        Today's Diagnoses     Recurrent UTI    -  1      Care Instructions    Please do the blood work and CT scan    Please return to see me for a cystoscopy (procedure to look in the bladder) and pelvic exam    Please come in one week before the cystoscopy for a nurse visit for a CATHETERIZED urine specimen    Supplements to prevent UTI  -probiotics  -cranberry: Ellura, Theracran  -d-mannose    If you think you have a urine infection please contact us [phone] to talk to a nurse about coming in for a catheterized specimen    It was a pleasure meeting with you today.  Thank you for allowing me and my team the privilege of caring for you today.  YOU are the reason we are here, and I truly hope we provided you with the excellent service you deserve.  Please let us know if there is anything else we can do for you so that we can be sure you are leaving completely satisfied with your care experience.            Follow-ups after your visit        Your next 10 appointments already scheduled     Feb 08, 2018  4:00 PM CST   LAB with  LAB   [facility] ([facility])    [address] [place] [id number] [phone]           Please do not eat 10-12 hours before your appointment if you are coming in fasting for labs on lipids, cholesterol, or glucose (sugar). This does not apply to pregnant women. Water, hot tea and black coffee (with nothing added) are okay. Do not drink other fluids, diet soda or chew gum.            Feb 13, 2018 11:20 AM CST   (Arrive by 11:05 AM)   CT ABDOMEN PELVIS W/O & W CONTRAST with UCCT1   [facility] CT ([facility])    [address] [place] [id number] [phone]           Please bring any scans or X-rays taken at other hospitals, if similar tests were done. Also bring a list of your medicines, including vitamins, minerals and over-the-counter drugs. It is safest to leave personal items at home.  Be sure to tell your doctor:   If you have any allergies.   If there s any chance you are pregnant.   If you are breastfeeding.   If you have any special needs.  You may have contrast for this exam. To prepare:   Do not eat or drink for 2 hours before your exam. If you need to take medicine, you may take it with small sips of water. (We may ask you to take liquid medicine as well.)   The day before your exam, drink extra fluids at least six 8-ounce glasses (unless your doctor tells you to restrict your fluids).  Patients over 70 or patients with diabetes or kidney problems:   If you haven t had a blood test (creatinine test) within the last 30 days, go to your clinic or Diagnostic Imaging Department for this test.  If you have diabetes:   If your kidney function is normal, continue taking your metformin (Avandamet, Glucophage, Glucovance, Metaglip) on the day of your exam.   If your kidney function is abnormal, wait 48 hours before restarting this medicine.  You will have oral contrast for this exam:   You will drink the contrast at home. Get this from your clinic or Diagnostic Imaging Department. Please follow the directions given.  Please wear loose clothing, such as a sweat suit or jogging clothes. Avoid snaps, zippers and other metal. We may ask you to undress and put on a hospital gown.  If you have any questions, please call the Imaging Department where you will have your exam.            Mar 15, 2018 11:10 AM CDT   (Arrive by 10:55 AM)   New Movement Disorder with Keith Styles MD   [facility] Neurology ([facility])    [address] [place] [address]  [facility] [address]   [phone]            Mar 19, 2018  1:40 PM CDT   (Arrive by 1:25 PM)   Return Visit with  Prostate Cancer Ctr Nurse   [facility] Urology and [facility] for Prostate and Urologic Cancers ([facility])    [address] [place] [address]   [phone]            Mar 22, 2018  1:00 PM CDT   (Arrive by 12:45 PM)   Cystoscopy with Alyson Mcgee MD   [facility] and [facility] for Prostate and Urologic Cancers ([facility])    [address] [place] [address]   [phone]              Future tests that were ordered for you today     Open Future Orders        Priority Expected Expires Ordered    Basic metabolic panel Routine  2019    CT Urogram Routine  2019            Who to contact     Please call your clinic at [phone] to:    Ask questions about your health    Make or cancel appointments    Discuss your medicines    Learn about your test results    Speak to your doctor            Additional Information About Your Visit        PresenterNetharZivity Information     Plyce is an electronic gateway that provides easy, online access to your medical records. With Plyce, you can request a clinic appointment, read your test results, renew a prescription or communicate with your care team.     To sign up for Plyce visit the website at [email or web address]   You will be asked to enter the access code listed below, as well as some personal information. Please follow the directions to create your username and password.     Your access code is: [email or web address]  Expires: 3/31/2018  5:19 PM     Your access code will  in 90 days. If you need help or a new code, please contact your [facility] or call [phone] for assistance.        Care EveryWhere ID     This is your Care EveryWhere ID. This could be used by other organizations to  "access your [place] medical records  [id number]        Your Vitals Were     Pulse Height BMI (Body Mass Index)             93 1.6 m (5' 3\") 23.37 kg/m2          Blood Pressure from Last 3 Encounters:   02/08/18 (!) 150/91   12/31/17 125/71   04/26/12 140/78    Weight from Last 3 Encounters:   02/08/18 59.8 kg (131 lb 14.4 oz)   12/31/17 57.6 kg (127 lb)              We Performed the Following     Urinalysis chemical screen POCT        Primary Care Provider Office Phone # Fax #    [facility] [address] [phone]       [facility] [address] [address]        Equal Access to Services     FERNANDA GARLAND : Hadii aad ku hadasho Soomaali, waaxda luqadaha, qaybta kaalmada adeegyada, valerie lange. So [facility] [phone].    ATENCIÓN: Si habla español, tiene a eller disposición servicios gratuitos de asistencia lingüística. [facility] [phone].    We comply with applicable federal civil rights laws and [place] laws. We do not discriminate on the basis of race, color, national origin, age, disability, sex, sexual orientation, or gender identity.            Thank you!     Thank you for choosing [facility] UROLOGY AND [facility] FOR PROSTATE AND UROLOGIC CANCERS  for your care. Our goal is always to provide you with excellent care. Hearing back from our patients is one way we can continue to improve our services. Please take a few minutes to complete the written survey that you may receive in the mail after your visit with us. Thank you!             Your Updated Medication List - Protect others around you: Learn how to safely use, store and throw away your medicines at www.disposemymeds.org.          This list is accurate as of 2/8/18  3:41 PM.  Always use your most recent med list.                   Brand Name Dispense Instructions for use Diagnosis    acetaminophen 500 MG tablet    TYLENOL     Take 500 mg by mouth        ASPIRIN LOW DOSE 81 MG tablet   Generic drug:  aspirin "      Take 1 tablet by mouth daily.        carbidopa-levodopa  MG per CR tablet    SINEMET CR     Take 1 tablet by mouth        celeXA 40 MG tablet   Generic drug:  citalopram      Take 40 mg by mouth daily.        lisinopril 2.5 MG tablet    PRINIVIL/Zestril     Take 2.5 mg by mouth        polyethylene glycol Packet    MIRALAX/GLYCOLAX     Take 17 g by mouth        * QUEtiapine 100 MG tablet    SEROquel     Take 100 mg by mouth        * QUEtiapine 25 MG tablet    SEROquel     Take 1 tabs in the morning and 1 tab at bedtime. (takes with a 100 mg tab at hs). DOSE INCREASE.        rOPINIRole 1 MG tablet    REQUIP     Take 1 mg by mouth        simvastatin 20 MG tablet    ZOCOR     Take 20 mg by mouth At Bedtime.        SUMAtriptan 25 MG tablet    IMITREX     Take 25 mg by mouth        SYNTHROID 50 MCG tablet   Generic drug:  levothyroxine      Take 50 mcg by mouth daily.        vitamin D 2000 UNITS Caps      Take  by mouth.        * Notice:  This list has 2 medication(s) that are the same as other medications prescribed for you. Read the directions carefully, and ask your doctor or other care provider to review them with you.

## 2018-02-08 NOTE — PROGRESS NOTES
February 8, 2018    Referring Provider: Martha Christie MD    Primary Care Provider: Clinic, [facility] [place]    CC: Recurrent UTI    HPI:  Paige Mercado is a 72 year old female who presents for evaluation of her pelvic floor symptoms.  Patient is accompanied by her friend Ms Lujan who is also a patient of mine.  She brings her friend in today to discuss Ricco.    She has an history of Parkinson's disease and has had some episodes of UTI and confusion.  With each infection she seems to continue to have some cognitive decline and now some significant anxiety about this.   Ms Lujan brings her friend in here today for further evaluation to see if we can help prevent UTI    She has had E coli in her bladder 5/2017, 12/18/2017 and 12/26/2017    Hysterectomy for fibroids, history of breast cancer    Past Medical History:   Diagnosis Date     Cancer (H)     breast     Past Surgical History:   Procedure Laterality Date     GYN SURGERY      hysterectomy     Social History     Social History     Marital status: Unknown     Spouse name: N/A     Number of children: N/A     Years of education: N/A     Occupational History     Not on file.     Social History Main Topics     Smoking status: Never Smoker     Smokeless tobacco: Never Used     Alcohol use No     Drug use: No     Sexual activity: Not on file     Other Topics Concern     Not on file     Social History Narrative     No family history on file.    ROS    Allergies   Allergen Reactions     Ciprofloxacin GI Disturbance     Sulfa Drugs GI Disturbance     Current Outpatient Prescriptions   Medication     carbidopa-levodopa (SINEMET CR)  MG per CR tablet     lisinopril (PRINIVIL/ZESTRIL) 2.5 MG tablet     QUEtiapine (SEROQUEL) 100 MG tablet     QUEtiapine (SEROQUEL) 25 MG tablet     rOPINIRole (REQUIP) 1 MG tablet     SUMAtriptan (IMITREX) 25 MG tablet     acetaminophen (TYLENOL) 500 MG tablet     polyethylene glycol (MIRALAX/GLYCOLAX) Packet     levothyroxine  "(SYNTHROID) 50 MCG tablet     aspirin (ASPIRIN LOW DOSE) 81 MG tablet     Cholecalciferol (VITAMIN D) 2000 UNIT CAPS     simvastatin (ZOCOR) 20 MG tablet     citalopram (CELEXA) 40 MG tablet     No current facility-administered medications for this visit.      BP (!) 150/91  Pulse 93  Ht 1.6 m (5' 3\")  Wt 59.8 kg (131 lb 14.4 oz)  BMI 23.37 kg/m2 No LMP recorded. Patient has had a hysterectomy. Body mass index is 23.37 kg/(m^2).  She is alert, comfortable in no acute distress, non-labored breathing.  Exam deferred until time of cystoscopy  Urine dip negative    PVR 15 mL by bladder scan    A/P: Paige Mercado is a 72 year old F with Parkinson's disease, history of cognitive decline, Ricco    Discussed evaluation to include CT scan and return for cystoscopy, pelvic exam    Discussed available supplements    Discussed the importance of contacting our clinic for urine specimen if concern for infection, preferably for CATHETERIZED urine    RTC for cystoscopy after CT scan complete    30 minutes were spent with the patient today, > 50% in counseling and coordination of care    Alyson Mcgee MD MPH    Urology    CC  Patient Care Team:  [facility], [facility] [place] as PCP - General  Everardo, Alyson Mariee MD as MD (Urology)  Trixie Aldridge, RN as Registered Nurse (Urology)  SELF, REFERRED              "

## 2018-02-13 ENCOUNTER — RADIANT APPOINTMENT (OUTPATIENT)
Dept: CT IMAGING | Facility: CLINIC | Age: 73
End: 2018-02-13
Attending: UROLOGY
Payer: COMMERCIAL

## 2018-02-13 DIAGNOSIS — N39.0 RECURRENT UTI: ICD-10-CM

## 2018-02-13 RX ORDER — IOPAMIDOL 755 MG/ML
80 INJECTION, SOLUTION INTRAVASCULAR ONCE
Status: COMPLETED | OUTPATIENT
Start: 2018-02-13 | End: 2018-02-13

## 2018-02-13 RX ADMIN — IOPAMIDOL 80 ML: 755 INJECTION, SOLUTION INTRAVASCULAR at 11:13

## 2018-02-13 NOTE — DISCHARGE INSTRUCTIONS

## 2018-02-27 ENCOUNTER — HOSPITAL ENCOUNTER (OUTPATIENT)
Dept: PHYSICAL THERAPY | Age: 73
Setting detail: THERAPIES SERIES
Discharge: STILL ADMITTED OR ADMINISTRATIVE | End: 2018-02-27
Attending: PHYSICAL THERAPIST

## 2018-02-27 DIAGNOSIS — R29.3 POSTURAL INSTABILITY: ICD-10-CM

## 2018-02-27 DIAGNOSIS — R26.81 GAIT INSTABILITY: ICD-10-CM

## 2018-02-28 PROBLEM — Z98.890 S/P COLONOSCOPY: Status: ACTIVE | Noted: 2018-02-28

## 2018-02-28 PROBLEM — K31.7 HYPERPLASTIC POLYPS OF STOMACH: Status: ACTIVE | Noted: 2018-02-28

## 2018-02-28 PROBLEM — Z92.89 H/O BONE DENSITY STUDY: Status: ACTIVE | Noted: 2018-02-28

## 2018-02-28 PROBLEM — R73.03 PREDIABETES: Status: ACTIVE | Noted: 2018-02-28

## 2018-02-28 PROBLEM — F41.9 ANXIETY: Status: ACTIVE | Noted: 2018-02-28

## 2018-02-28 PROBLEM — G20.A1 PARKINSON DISEASE (H): Status: ACTIVE | Noted: 2018-02-28

## 2018-03-11 RX ORDER — QUETIAPINE FUMARATE 25 MG/1
TABLET, FILM COATED ORAL
COMMUNITY
Start: 2018-02-28 | End: 2018-03-15

## 2018-03-11 RX ORDER — ROPINIROLE 1 MG/1
1 TABLET, FILM COATED ORAL 3 TIMES DAILY
COMMUNITY
Start: 2018-03-02 | End: 2018-03-15

## 2018-03-11 RX ORDER — QUETIAPINE FUMARATE 100 MG/1
100 TABLET, FILM COATED ORAL AT BEDTIME
Qty: 60 TABLET | COMMUNITY
Start: 2018-03-11 | End: 2018-03-15

## 2018-03-11 NOTE — PROGRESS NOTES
Summary and Recommendations:     Parkinsonism           - discussed to make sure that her sinemet medication should be one hour before protein or two hours             after protein            - may need as needed sinemet for tremor  Mood disorder - anxiety she is relatively well managed with citalopram 40mg/day and seroquel 25mg in the morning and 125mg in the evening. She has not been on remeron or pimavanserin (nuplazid)  Driving issue  Cognitive issues    - neuropsychological evaluation was ordered but she may not proceed wit th is              - she has not had a formal moca   - she and her daughter was informed about the rivastigmine/exelon patch   Constipation -               - need to be more aggressive daily regimen - miralax or/and senokot as well as diet    Briefly discussed new treatments  rytary   nuplazid    Educational program  1st Saturday in June    She could meet with a pharmacist that works here - Zainab Sanches, Pharm D    She is doing an exercise program - she is walking in the summer and walks in the dome in [facility] [employer]  She does go to the parkinson Classes.     Signed up for dimple     Return to see Alyssa Santos in 3-4 months  May need to call daughter at the time of the visit to help with discussion of treatment  We did this today and it worked well as daughter is home school ing 2/5 kids.        Medications     7am 11am 3pm 8pm 10pm     Acetaminophen 500mg prn             Aspirin 81mg 1             Carbidopa/levodopa 25/100 Sinemet  2 2 2 2       Cholecalciferol vitamin d2000 1             Citalopram celexa 40mg 1             Levothyroxine synthroid 50 mcg 1             Lisinopril prinivil/zestril 2.5mg         1     Polyethylene glycol miralax As needed             Quetiapine 100mg seroquel         1     Quetiapine 25mg seroquel 1       1     Ropinirole requip 1mg 1 1 1         Simvastatin zocor 20mg         1     Sumatriptan imitrex 25mg As needed                                                     Keith Styles MD  _____________________________________________________________________  PATIENT: Paige Mercado  72 year old female   : 1945  VLADIMIR: March 15, 2018    Consult requested by pcp/other  Outside records reviewed and revealed  - inserted.   History obtained from patient  History of Present Illness  71 yo woman initially seen by Dr. Palm in  and most recently in 2017 - details from two visits at end of note.  Diagnosed in   It was 2012    She was referred by another patient RENA Fenton  She needs to take a test about driving issue at [facility]  May be seeing an OT at [facility] for an evaluation    She has not had falls  She has not had hallucinations  She has done physical therapy at [facility]  Wears glasses. She has signed up may 1, 2018 right and then left cataract surgery.  Denies hearing loss  Has some constipation and uses miralax as needed and may have one bowel movement per week  Has bladder issues  -  She is not wearing a pad. She has urgency and when she is zippping her pants.   Will see urology Dr Mtz. She has nocturia 1-2/noc  Falls asleep okay and can go back to sleep  No clear snoring. She does not know if she has involuntary movements at night  No family history of parkinson  She has problems with her balance on her bike  She has had loss of smell  She has left? Shoulder problem  Right handed with onset on the left side  Nonsmoker. Denies lung problems  Recent cold  Heart - has been taking blood pressure medication for a while  Endo: - she has been on thyroid medications for a long time. Denies diabetes  She has been on cholesterol medication for about 10 yrs  Denies  Anemia  cipro allergy and sulfa allergy  Lost 40 lbs in 2 months.  Has not seen gi  Has imitrex on her medication list - had migraines in the past which resolved with menopause  Mood issues. 17 yrs of mood problems  Has a doctor she sees at [facility] Dr Maria Del Carmen Mackay  She had been  weaned off xanax and had an increased dose of her quetiapine   She has had problems with bladder infections which has caused confusion - this was in the summer and again around the turn of year December/January     1. Forgetfullness/memory/word finding problems  2. New Rxs  3. Constipation  4. Eliminate pills if possible  5. Handwriting  6. Tremor    Spoke with daughter (on phone) with patient in the visit.       Medications     7am 11am 3pm 8pm 10pm    Acetaminophen 500mg prn        Aspirin 81mg 1        Carbidopa/levodopa 25/100 Sinemet  2 2 2 2     Cholecalciferol vitamin d2000 1        Citalopram celexa 40mg 1        Levothyroxine synthroid 50 mcg 1        Lisinopril prinivil/zestril 2.5mg     1    Polyethylene glycol miralax As needed        Quetiapine 100mg seroquel     1    Quetiapine 25mg seroquel 1    1    Ropinirole requip 1mg 1 1 1      Simvastatin zocor 20mg     1    Sumatriptan imitrex 25mg As needed                              14 Review of systems  are negative except for   Patient Active Problem List   Diagnosis     Allergic rhinitis     Anxiety     Conversion disorder     Depression     Essential hypertension     Generalized anxiety disorder     H/O bone density study     HTN (hypertension), benign     Hyperlipidemia     Hyperplastic polyps of stomach     Hypothyroidism     Lumbago     Migraine     Osteoarthrosis     Parkinson disease (H)     Parkinson's disease (H)     Parotid mass     PD (Parkinson's disease) (H)     Pyelonephritis     Prediabetes     S/P colonoscopy     Warthin's tumor        Allergies   Allergen Reactions     Ciprofloxacin GI Disturbance     Sulfa Drugs GI Disturbance     Past Surgical History:   Procedure Laterality Date     GYN SURGERY      hysterectomy     Past Medical History:   Diagnosis Date     Cancer (H)     breast     Social History     Social History     Marital status: Unknown     Spouse name: N/A     Number of children: N/A     Years of education: N/A      Occupational History     Not on file.     Social History Main Topics     Smoking status: Never Smoker     Smokeless tobacco: Never Used     Alcohol use No     Drug use: No     Sexual activity: Not on file     Other Topics Concern     Not on file     Social History Narrative     No family history on file.  Current Outpatient Prescriptions   Medication Sig Dispense Refill     acetaminophen (TYLENOL) 500 MG tablet Take 500 mg by mouth       carbidopa-levodopa (SINEMET CR)  MG per CR tablet Take 1 tablet by mouth       lisinopril (PRINIVIL/ZESTRIL) 2.5 MG tablet Take 2.5 mg by mouth       polyethylene glycol (MIRALAX/GLYCOLAX) Packet Take 17 g by mouth       QUEtiapine (SEROQUEL) 100 MG tablet Take 100 mg by mouth       QUEtiapine (SEROQUEL) 25 MG tablet Take 1 tabs in the morning and 1 tab at bedtime. (takes with a 100 mg tab at hs). DOSE INCREASE.       rOPINIRole (REQUIP) 1 MG tablet Take 1 mg by mouth       SUMAtriptan (IMITREX) 25 MG tablet Take 25 mg by mouth       levothyroxine (SYNTHROID) 50 MCG tablet Take 50 mcg by mouth daily.       aspirin (ASPIRIN LOW DOSE) 81 MG tablet Take 1 tablet by mouth daily.       Cholecalciferol (VITAMIN D) 2000 UNIT CAPS Take  by mouth.       simvastatin (ZOCOR) 20 MG tablet Take 20 mg by mouth At Bedtime.       citalopram (CELEXA) 40 MG tablet Take 40 mg by mouth daily.       Examination  B/P: Data Unavailable, T: Data Unavailable, P: Data Unavailable, R: Data Unavailable 0 lbs 0 oz  There were no vitals taken for this visit., There is no height or weight on file to calculate BMI.    Vitals signs were added and reviewed if not above. Please refer to the chart from this visit.    General examination: well developed, nourished and normal affect  Carotid: No bruits. Chest CTA, Heart regular without gallops or murmurs. Abdomen soft nontender, no masses, bowel sounds intact. Periphery: normal pulses without edema. No skin lesions. MENTAL STATUS:  Alert, oriented x - unable  to state the month but can state the day of week and year.  Speech fluent with normal naming, repetition, comprehension.  Good right-left orientation, Can remember 2/3 objects. Able to spell world backwards.   CRANIAL NERVES:  Disks flat. Pupils are equal, round, reactive to light.  Normal vascularity and fields. Extraocular movements full.  Facial sensation and movement normal.  Hearing intact. Palate moves symmetrically.  Tongue midline.  Sternocleidomastoid and trapezius strength intact.  Neck strength was normal.  NEUROLOGIC:  Tone: slight increased tone. Motor in upper and lower extremities. 5/5.  Reflexes 2/4.  Toe signs downgoing.  Good finger-nose-finger, fine finger movement, heel-shin maneuver, sensation to light touch, position sense and vibration and temperature was normal. Gait normal. Romberg and postural stability intact. Tremor  Bilateral resting tremor.     Progress Notes  - in this encounter    Librado Mcghee DO - 2017 1:33 PM CST      NAME: TRUPTI HICKS MR#: [id number]  CSN: [id number]  AUTHENTICATING CLINICIAN: Librado Mcghee DO  CONFIRM #: [id number]  LOC: 223    CLINIC PROGRESS NOTE    DATE OF VISIT: 2017  : 1945    Mrs. Hicks returns for followup. She has Parkinson's disease. Her big issue for the day is the fact that she has had her driving privileges curtailed. This is on the strength of an OT evaluation for which she did not do well.     She has abided by this decision, but is quite unhappy.     From a Parkinson's point of view, she is doing pretty well. Speech is normal. No problems with drooling. No particular problems using eating utensils or taking care of her other activities of daily living. Has not had any falls.     She does have anxiety and is working with Psychiatry. For this she takes Seroquel 100 mg at night and 25 mg twice during the day.     She is also on Celexa 40 mg a day.     She is on carbidopa/levodopa immediate release 25/100 size tablets 2 tablets  at 7 a.m., 11 a.m., 3 p.m., and 8 p.m. She is also on ropinirole 1 mg 3 times a day.     There have been no hallucinations.     She is somewhat slowed in her movements, but easy to understand in her speech. Extraocular movements are full. Face moves symmetrically. Gait ignition, posture actually pretty good. Turns are adequate, showing no postural instability.    IMPRESSION:  Parkinson's disease. Reasonably well medicated and enjoying fairly normal movements. I will leave her Parkinson's medicines the same.     The big issue for the day is that of her restricted driving. There were abnormalities on her pre-driving OT assessment which would suggest significant risk. I cannot sign off on her driving. She would like to contest this. The way forward is a formal driving evaluation. Only if she passed with reasonable facility would I consider allowing her to drive again. This was discussed in no uncertain terms. I will discuss this with Dr. Villaseñor. I will see her back in followup in 4 months' time.     TT: 30. CT: 25.    CARMELO:MICHAEL C:   D:17 13:33 T: 17 13:58 CONFIRM #: [id number]      Progress Notes  - in this encounter    Librado Mcghee DO - 2012 11:41 AM CST  Formatting of this note may be different from the original.  Progress Notes signed by Librado Mcghee DO at 12 1200   Author: Librado Mcghee DO Service: (none) Author Type: Physician   Filed: 12 1200 Note Time: 12 1141 Status: Signed   : Librado Mcghee DO (Physician)       NAME: TRUPTI HICKS MR#: [id number]  CSN: [id number]  AUTHENTICATING CLINICIAN: Librado Mcghee DO  CONFIRM #: [id number]  LOC: 223    CLINIC PROGRESS NOTE    DATE OF VISIT: 2012  : 1945    I am seeing Mrs. Hicks at the request of Dr. Villaseñor. Patient has history of tremor. She is felt to have a postural tremor, probably essential tremor of her outstretched hand. This was pointed out to her perhaps 6 or 7 months ago, she really did not notice it. But  since that time, she has noticed it, usually when she is doing something with her hand. Does not freeze, festinate or fall. She drags her left foot on occasion.    PAST MEDICAL HISTORY:  Has a history of constipation for number of years. Also treated for depression/anxiety. No hallucinations. No cognitive changes. No sleep disturbance that she knows of. She is treated for hypertension, hyperlipidemia, hypothyroidism. History of migraine.    MEDICATIONS:  Normally takes p.r.n. alprazolam, aspirin a day. Otherwise on Celexa 40 mg a day. Zocor, Synthroid, p.r.n. Imitrex.    ALLERGIC:  Sulfa, ciprofloxacin.    SOCIAL HISTORY:  Nonsmoker. . Retired teacher.    PHYSICAL EXAMINATION:  She has a decreased blink frequency but reasonably normal voice volume. She has a high-frequency, subtle low amplitude tremor of her outstretched hand on the left, maybe a hint of such on the right, but it is fairly asymmetric. She does have a rest tremor of her left hand which is different amplitude, different frequency, being higher amplitude and slower frequency. Finger taps show some mild impairment as the longer she goes the smaller the amplitude becomes and the slower is the tap right. Same is true for rapid finger flexion extension, rapid alternating supination and pronation. She has a definite rigidity on the left with reinforcement maneuvers. Finger taps and tone are normal on the right. She has decreased heel taps on the left versus the right. Able to cross her arms over   chest and stand without difficulty. Initiates her gait without freezing or festinating. Stride length is good, but she has decreased arm swing on the left with re-emergent tremor of her left hand which is the slower frequency, higher amplitude tremor previously described. Gross power is normal throughout. Cranial nerves are intact. No tremor of her chin noted. Gross sensory exam is intact. Reflexes symmetric for biceps, triceps, brachioradialis, knee and  ankle jerks. Toes are downgoing. Finger-nose-finger is intact.    ASSESSMENT:  Movement disorder with akinetic rigid features. She has actually 2 kinds of tremor. She has a high-frequency low-amplitude tremor which is more essential tremor like, but I think the more worrisome part of her exam is rest tremor that I am noticing which is subtle and intermittent but definitely present as well as bradykinesia and rigidity. This bespeaks to a Parkinson's-like state.     I discussed this with her. Naturally she is sad about the way our discussion went, but I gave her reason for not pushing the panic button and allowing me to image her head with an MRI and get a full evaluation through [facility] with PT, OT, speech, family, and Nursing Services. I will then discuss medicines with her when I see her in followup. I do not think that she needs the medicines from a safety and perhaps not from a functional point of view at this point in time.     Will discuss this with primary care.    CARMELO:MICHAEL C:   D:11/13/12 11:41 T: 11/13/12 21:48 CONFIRM #: [id number]       Allergies    Active Allergy Reactions Severity Noted Date Comments   Ciprofloxacin     04/22/2005 PN: LW Reaction: NERVOUSNESS     Sulfa Antibiotics     05/10/2004 PN: LW Reaction: GI Upset     Current Medications    Prescription Sig. Disp. Refills Start Date End Date Status   aspirin 81 MG tablet   Take 1 tablet by mouth daily (every 24 hours). 90   3 03/30/2004   Active   Cholecalciferol 2000 UNITS   Take 1 capsule by mouth daily (every 24 hours).     12/18/2012   Active   SUMAtriptan (IMITREX) 25 MG tablet    Indications: Parkinson disease ([facility]) Take 1 tablet by mouth once as needed. 9 tablet   3 05/09/2013   Active   acetaminophen (TYLENOL) 500 MG tablet   Take 500 mg by mouth every 4 hours as needed for Pain. Maximum 4000mg per 24 hours     03/10/2015   Active   polyethylene glycol (MIRALAX) packet   Take 17 g by mouth daily as needed for  Constipation. Reported on 4/10/2017         Active   carbidopa-levodopa (SINEMET)  MG tablet    Indications: Parkinson disease ([facility]) Take 2 Tabs by mouth 4 times a day. [id number] 720 Tab   3 06/06/2017   Active   lisinopril (ZESTRIL) 2.5 MG tablet   Take 1 Tab by mouth daily. 90 Tab   2 11/28/2017   Active   QUEtiapine (SEROQUEL) 100 MG tablet   Take 1 Tab by mouth daily at bedtime. takes with a 25 mg tab at hs - Future refills 90 Tab   3 12/12/2017   Active   hydrocortisone (PROCTOSOL-HC) 2.5 % rectal cream   Insert rectally two times a day. 30 g   0 12/18/2017   Active   QUEtiapine (SEROQUEL) 25 MG tablet   Take 2 tabs in the morning and 1 tab at bedtime. (takes with a 100 mg tab at hs). DOSE INCREASE. 270 Tab   0 02/28/2018   Active   rOPINIRole (REQUIP) 1 MG tablet   TAKE 1 TABLET THREE TIMES DAILY 270 Tab   2 03/02/2018   Active   levothyroxine (SYNTHROID) 50 MCG tablet   TAKE 1 TABLET EVERY DAY 90 Tab   0 03/01/2018   Active   citalopram (CELEXA) 40 MG tablet   TAKE 1 TABLET EVERY DAY 90 Tab   1 03/01/2018   Active   simvastatin (ZOCOR) 20 MG tablet   TAKE 1 TABLET EVERY EVENING 90 Tab   1 03/01/2018   Active   rOPINIRole (REQUIP) 1 MG tablet   Take 1 Tab by mouth three times a day. 270 Tab   3 03/07/2017 03/01/2018 Discontinued   citalopram (CELEXA) 40 MG tablet   Take 1 Tab by mouth daily. 90 Tab   3 05/02/2017 03/01/2018 Discontinued   levothyroxine (SYNTHROID) 50 MCG tablet   Take 1 Tab by mouth daily. 90 Tab   3 05/02/2017 03/01/2018 Discontinued   simvastatin (ZOCOR) 20 MG tablet   Take 1 Tab by mouth every evening. 90 Tab   3 05/02/2017 03/01/2018 Discontinued   QUEtiapine (SEROQUEL) 25 MG tablet   Take 1 tabs in the morning and 1 tab at bedtime. (takes with a 100 mg tab at hs). DOSE INCREASE. 180 Tab   0 01/16/2018 02/28/2018 Discontinued   Active Problems    Problem Noted Date   Warthin's tumor 01/17/2013   Essential hypertension ([facility]) 01/05/2013   Parkinson's disease ([facility]) 01/05/2013    Parotid mass 11/16/2012   Overview:     1.5cm on open side MRI [address] [facility]  Fax        Generalized anxiety disorder ([facility]) 10/24/2012   Pyelonephritis 03/27/2008   Overview:     : hospitalization  : 2007       Osteoarthritis 01/20/2005   Overview:     DJD     Migraine 01/20/2005   Overview:     Migraine NOS     CONVERSION DX 01/20/2005   Overview:     LW Uncoded Problem, needs review: SI dysfunction     CONVERSION DX 01/20/2005   Overview:     LW Uncoded Problem, needs review: bone scan last was 2002     Essential hypertension ([facility]) 06/07/2003   Overview:     Hypertension     Hyperlipidemia ([facility]) 06/07/2003   Hypothyroidism ([facility]) 06/07/2003   Overview:     Hypothyroidism Acquired     Allergic rhinitis 06/07/2003   Overview:     Rhinitis Allergic NOS     Lumbago ([facility]) 06/07/2003   Overview:     Pain Low Back     Parkinson disease ([facility])     Overview:     11/2012       Anxiety ([facility])     H/O bone density study     Overview:     2009 due again 2017       S/P colonoscopy     Overview:     last was 2014 due again in 2024       Hyperplastic polyps of stomach     Overview:     due 2024 ( I do not see record of this in medical record or in scan doc-   reverify)        Prediabetes     Overview:     A1C 5.7     Resolved Problems    Problem Noted Date Resolved Date   Health care home, active care coordination 12/04/2015 12/30/2016   Overview:     Care Coordinator: Richard Oliver [phone]   Care coordination focus: Coordination  Living situation: Paige lives independently in a single family home.  Important notes:   See care plan under Chart Review > Misc Reports > [facility] CARE PLAN REPORT       Pneumonia due to organism 01/20/2005 06/09/2014   Overview:     : 11/2000 requiring hosptialization  ; Pneumonia NOS     Anxiety state ([facility]) 09/22/2004 02/05/2006   Overview:     LW Onset: [id number]  ; Anxiety NOS     Most Recent Encounters    Date Type Specialty Care Team Description   03/01/2018  Refill Internal Medicine Nolvia Villaseñor MD   Refill (levothyroxine (SYNTHROID) 50 MCG tablet [Pharmacy Med Name: LEVOTHYROXINE SODIUM 50 MCG Tablet]; citalopram (CELEXA) 40 MG tablet [Pharmacy Med Name: CITALOPRAM HYDROBROMIDE 40 MG Tablet]; simvastatin (ZOCOR) 20 MG tablet [Pharmacy Med Name: SIMVASTATIN 20 MG Tablet])   03/01/2018 Refill Neurology Librado Mcghee,    Refill (Requip 1mg)   02/28/2018 Telephone Internal Medicine Nolvia Villaseñor MD   Forms (PT - Plan of Care)   Immunizations  Reconcile with Patient's Chart    Name Dates Previously Given Next Due   DPT-Historical 06/13/1989     Flu Vac Preserv Free (3+yrs) 10/02/2012, 10/30/2010, 09/09/2009, 10/01/2008, 10/15/2007, 11/14/2005, 12/03/2004     H1n1 Miv Sanofi 3+ Yr (Injected) 12/31/2009     HepA-HepB (TWINRIX, 18+ yrs) 06/26/2008, 02/06/2008, 01/02/2008     Influenza (Fluarix 0.5, 3+ yrs or FluLaval 0.5) 10/12/2013     Influenza Vaccine - Historical 10/12/2016, 10/20/2003, 11/06/2002, 10/18/2001, 12/15/2000, 10/22/1993     PCV13 (Prevnar) 07/27/2015     PPSV23 (Pneumovax) 04/25/2012, 12/23/2008     TDAP (ADACEL) 04/25/2012     Td 03/19/2002, 06/16/1992     Surgical History    Surgery Date Laterality Comments   TONSILLECTOMY         HEMORRHOIDECTOMY         HYSTERECTOMY         BREAST SURGERY     right mastectomy   SHOULDER ARTHROSCOPY         SALIVARY SURG UNLISTED PROC         PAROTIDECTOMY     L 2012 [place]this Tumor    MASTECTOMY 01/01/2001 Right right breast cancer   Medical History    Medical History Date Comments   History of breast cancer   right mastectomy Ductal cancer ERPR positive   Hypertension ([facility])       Hyperlipidemia ([facility])   Goal less than 100   Hypothyroid ([facility])       Pneumonia   hospitalization Nov 2000   S/P colonoscopy   last was 2014 due again in 2024   Pseudogout   knee pain calcium pyrophosphate crystals   Migraine   Rare. Quiescent   Pneumonia 2000 hospitalization   Pyelonephritis 2007 hospitalization   Parkinson disease ([facility])    2012   Parotid mass 12 L side parotid mass Parotidectomy Warthins tumor.    Anxiety ([facility])       Warthin tumor 2012 Removed surgically left side neck   Hyperplastic polyps of stomach 2014 due  ( I do not see record of this in medical record or in scan doc- reverify probably meant to be hyperplastic colon polyps?)    H/O bone density study    due again 2017   Cancer ([facility])   Breast   Prediabetes   A1C 5.7   Breast cancer ([facility]) 2001 rt   Family History    Medical History Relation Name Comments   cancer,throat Birth Father    unknown cause historyof throat cancer   diabetes type 2 Birth Father   diabetes type 2   Other Birth Mother       Stroke Birth Mother       cancer,kidney Birth Mother   renal cell cancer   cancer,uterus Maternal Aunt       Myocardial Infarction Maternal Grandfather   80s   Cancer, Breast Paternal Aunt       Other         Stroke     grandmother 80s   Cancer, Ovary Negative Family History         Relation Name Status Comments   Birth Father         Birth Mother         Maternal Aunt         Maternal Grandfather         Paternal Aunt         Social History    Tobacco Use Types Packs/Day Years Used Date   Former Smoker       Quit: 1973   Smokeless Tobacco: Never Used           Comments: Quit smoking:     Alcohol Use Drinks/Week oz/Week Comments   Yes 1 Cans of beer   0.6  One or two drinks a month.     Sex Assigned at Birth Date Recorded   Not on file

## 2018-03-13 ENCOUNTER — PRE VISIT (OUTPATIENT)
Dept: UROLOGY | Facility: CLINIC | Age: 73
End: 2018-03-13

## 2018-03-13 NOTE — TELEPHONE ENCOUNTER
Reason for visit: cystoscopy     Relevant information: [facility]Zon nurse visit for catheterized urine scheduled    Records/imaging/labs: CT available     Pt called: No need for a call    Rooming: do a urine dip

## 2018-03-15 ENCOUNTER — TELEPHONE (OUTPATIENT)
Dept: PHARMACY | Facility: OTHER | Age: 73
End: 2018-03-15

## 2018-03-15 ENCOUNTER — OFFICE VISIT (OUTPATIENT)
Dept: NEUROLOGY | Facility: CLINIC | Age: 73
End: 2018-03-15
Payer: COMMERCIAL

## 2018-03-15 ENCOUNTER — PRE VISIT (OUTPATIENT)
Dept: NEUROLOGY | Facility: CLINIC | Age: 73
End: 2018-03-15

## 2018-03-15 VITALS
WEIGHT: 130.5 LBS | SYSTOLIC BLOOD PRESSURE: 140 MMHG | HEART RATE: 73 BPM | HEIGHT: 63 IN | BODY MASS INDEX: 23.12 KG/M2 | DIASTOLIC BLOOD PRESSURE: 77 MMHG

## 2018-03-15 DIAGNOSIS — R41.89 COGNITIVE DECLINE: ICD-10-CM

## 2018-03-15 DIAGNOSIS — F80.0 ARTICULATION DISORDER: ICD-10-CM

## 2018-03-15 DIAGNOSIS — G20.C PARKINSONISM, UNSPECIFIED PARKINSONISM TYPE (H): Primary | ICD-10-CM

## 2018-03-15 RX ORDER — MULTIVIT-MIN/IRON/FOLIC ACID/K 18-600-40
CAPSULE ORAL
Qty: 30 CAPSULE | COMMUNITY
Start: 2018-03-15 | End: 2020-01-01

## 2018-03-15 RX ORDER — LEVOTHYROXINE SODIUM 50 UG/1
TABLET ORAL
Qty: 90 TABLET | Refills: 11 | COMMUNITY
Start: 2018-03-15 | End: 2018-11-13

## 2018-03-15 RX ORDER — LISINOPRIL 2.5 MG/1
TABLET ORAL
Qty: 90 TABLET | Refills: 3 | COMMUNITY
Start: 2018-03-15 | End: 2018-11-13

## 2018-03-15 RX ORDER — QUETIAPINE FUMARATE 100 MG/1
TABLET, FILM COATED ORAL
Qty: 60 TABLET | Refills: 11 | COMMUNITY
Start: 2018-03-15 | End: 2020-01-01

## 2018-03-15 RX ORDER — ROPINIROLE 1 MG/1
TABLET, FILM COATED ORAL
Qty: 270 TABLET | Refills: 3 | COMMUNITY
Start: 2018-03-15 | End: 2018-11-13

## 2018-03-15 RX ORDER — ACETAMINOPHEN 500 MG
500 TABLET ORAL EVERY 4 HOURS PRN
COMMUNITY
Start: 2018-03-15 | End: 2020-01-01

## 2018-03-15 RX ORDER — CITALOPRAM HYDROBROMIDE 40 MG/1
TABLET ORAL
Qty: 90 TABLET | COMMUNITY
Start: 2018-03-15 | End: 2018-12-13

## 2018-03-15 RX ORDER — SUMATRIPTAN 25 MG/1
25 TABLET, FILM COATED ORAL
Qty: 30 TABLET | COMMUNITY
Start: 2018-03-15 | End: 2020-01-01

## 2018-03-15 RX ORDER — QUETIAPINE FUMARATE 25 MG/1
TABLET, FILM COATED ORAL
Qty: 60 TABLET | COMMUNITY
Start: 2018-03-15 | End: 2018-03-15

## 2018-03-15 RX ORDER — POLYETHYLENE GLYCOL 3350 17 G/17G
17 POWDER, FOR SOLUTION ORAL DAILY PRN
Qty: 7 PACKET | COMMUNITY
Start: 2018-03-15 | End: 2018-12-13

## 2018-03-15 RX ORDER — QUETIAPINE FUMARATE 25 MG/1
TABLET, FILM COATED ORAL
Qty: 60 TABLET | COMMUNITY
Start: 2018-03-15 | End: 2018-09-18

## 2018-03-15 RX ORDER — CARBIDOPA AND LEVODOPA 25; 100 MG/1; MG/1
TABLET ORAL
Qty: 720 TABLET | Refills: 3 | COMMUNITY
Start: 2018-03-15 | End: 2018-09-18

## 2018-03-15 RX ORDER — SIMVASTATIN 20 MG
TABLET ORAL
Qty: 30 TABLET | COMMUNITY
Start: 2018-03-15 | End: 2018-11-13

## 2018-03-15 ASSESSMENT — PAIN SCALES - GENERAL: PAINLEVEL: NO PAIN (0)

## 2018-03-15 NOTE — Clinical Note
3/15/2018       RE: Paige Mercado  [address] [id number]     Dear Colleague,    Thank you for referring your patient, Paige Mercado, to the [facility] NEUROLOGY at [facility]. Please see a copy of my visit note below.    No notes on file    Again, thank you for allowing me to participate in the care of your patient.      Sincerely,    Keith Styles MD

## 2018-03-15 NOTE — TELEPHONE ENCOUNTER
MTM referral from: [place] clinic visit (referral by provider)    MTM referral outreach attempt #1 on March 15, 2018 at 2:29 PM      Outcome: Left Message    Víctor Agarwal MTM Coordinator

## 2018-03-15 NOTE — NURSING NOTE
"Chief Complaint   Patient presents with     Consult     MOVEMENT DISORDER       Initial /77  Pulse 73  Ht 1.6 m (5' 3\")  Wt 59.2 kg (130 lb 8 oz)  BMI 23.12 kg/m2 Estimated body mass index is 23.12 kg/(m^2) as calculated from the following:    Height as of this encounter: 1.6 m (5' 3\").    Weight as of this encounter: 59.2 kg (130 lb 8 oz).  Medication Reconciliation: complete   Yokasta Eden      "

## 2018-03-15 NOTE — MR AVS SNAPSHOT
After Visit Summary   3/15/2018    Paige Mercado    MRN: [id number]           Patient Information     Date Of Birth          1945        Visit Information        Provider Department      3/15/2018 11:10 AM Keith Styles MD [facility] Neurology        Today's Diagnoses     Parkinsonism, unspecified Parkinsonism type (H)    -  1    Cognitive decline        Articulation disorder          Care Instructions    Parkinsonism           - discussed to make sure that her sinemet medication should be one hour before protein or two hours             after protein            - may need as needed sinemet for tremor  Mood disorder - anxiety she is relatively well managed with citalopram 40mg/day and seroquel 25mg in the morning and 125mg in the evening. She has not been on remeron or pimavanserin (nuplazid)  Driving issue  Cognitive issues    - neuropsychological evaluation was ordered but she may not proceed wit th is              - she has not had a formal moca   - she and her daughter was informed about the rivastigmine/exelon patch   Constipation -               - need to be more aggressive daily regimen - miralax or/and senokot as well as diet    Briefly discussed new treatments  rytary   nuplazid    Educational program  1st Saturday in June    She could meet with a pharmacist that works here - Zainab Sanches, Pharm D    She is doing an exercise program - she is walking in the summer and walks in the dome in [facility] [employer]  She does go to the parkinson Classes.     Signed up for dimple     Return to see Alyssa Santos in 3-4 months  May need to call daughter at the time of the visit to help with discussion of treatment  We did this today and it worked well as daughter is home school ing 2/5 kids.        Medications     7am 11am 3pm 8pm 10pm     Acetaminophen 500mg prn             Aspirin 81mg 1             Carbidopa/levodopa 25/100 Sinemet  2 2 2 2       Cholecalciferol vitamin d2000 1              Citalopram celexa 40mg 1             Levothyroxine synthroid 50 mcg 1             Lisinopril prinivil/zestril 2.5mg         1     Polyethylene glycol miralax As needed             Quetiapine 100mg seroquel         1     Quetiapine 25mg seroquel 1       1     Ropinirole requip 1mg 1 1 1         Simvastatin zocor 20mg         1     Sumatriptan imitrex 25mg As needed                                                  Keith Styles MD          Follow-ups after your visit        Additional Services     MED THERAPY MANAGE REFERRAL       Your provider has referred you to: hallie easley  Reason for Referral: Parkinson    The [facility] department will contact you to schedule an appointment.  You may also schedule the appointment by calling [phone].  For [facility] [place] patients, please call [phone] to confirm/schedule your appointment on the next business day.    This service is designed to help you get the most from your medications.  A specially trained Pharmacist will work closely with you and your providers to solve any questions, concerns, issues or problems related to your medications.    Please bring all of your prescription and non-prescription medications (such as vitamins, over-the-counter medications, and herbals) or a detailed medication list to your appointment.    If you have a glucose meter or other home monitoring information, please also bring this to your appointment (i.e. blood glucose log, blood pressure log, pain log, etc.).            NEUROPSYCHOLOGY REFERRAL       Your provider has referred you to:  Dr. Vo    All scheduling is subject to the client's specific insurance plan & benefits, provider/location availability, and provider clinical specialities.  Please arrive 15 minutes early for your first appointment and bring your completed paperwork.    Please be aware that coverage of these services is subject to the terms and limitations of your health  "insurance plan.  Call member services at your health plan with any benefit or coverage questions.    Please bring the following to your appointment:  >>   Any x-rays, CTs or MRIs which have been performed.  Contact the facility where they were done to arrange for  prior to your scheduled appointment.  Any new CT, MRI or other procedures ordered by your specialist must be performed at a [facility] facility or coordinated by your clinic's referral office.    >>   List of current medications   >>   This referral request   >>   Any documents/labs given to you for this referral            OCCUPATIONAL THERAPY REFERRAL       *This therapy referral will be filtered to a centralized scheduling office at [facility] and the patient will receive a call to schedule an appointment at a [facility] location most convenient for them. *     [facility] provides Occupational Therapy evaluation and treatment and many specialty services across the [facility] system.  If requesting a specialty program, please choose from the list below.    If you have not heard from the scheduling office within 2 business days, please call [phone] for all locations, with the exception of [place], please call [phone] and [facility], please call [phone]    Treatment: Evaluation & Treatment  Special Instructions/Modalities: evaluate and treat - big and loud  Special Programs: Parkinson's Rehabilitation - Big and Loud    Please be aware that coverage of these services is subject to the terms and limitations of your health insurance plan.  Call member services at your health plan with any benefit or coverage questions.      **Note to Provider:  If you are referring outside of [facility] for the therapy appointment, please list the name of the location in the \"special instructions\" above, print the referral and give to the patient to schedule the appointment.            PHYSICAL THERAPY REFERRAL       " "*This therapy referral will be filtered to a centralized scheduling office at [facility] and the patient will receive a call to schedule an appointment at a [facility] location most convenient for them. *     [facility] provides Physical Therapy evaluation and treatment and many specialty services across the [facility] system.  If requesting a specialty program, please choose from the list below.    If you have not heard from the scheduling office within 2 business days, please call [phone] for all locations, with the exception of [place], please call [phone] and [place], please call [phone]  Treatment: Evaluation & Treatment  Special Instructions/Modalities: evaluate and treat  Special Programs: Parkinsons Rehabilitation - Big and Loud     Please be aware that coverage of these services is subject to the terms and limitations of your health insurance plan.  Call member services at your health plan with any benefit or coverage questions.      **Note to Provider:  If you are referring outside of [facility] for the therapy appointment, please list the name of the location in the \"special instructions\" above, print the referral and give to the patient to schedule the appointment.            SPEECH THERAPY REFERRAL       *This therapy referral will be filtered to a centralized scheduling office at [facility] and the patient will receive a call to schedule an appointment at a [place] location most convenient for them. *     [facility] provides Speech Therapy evaluation and treatment and many specialty services across the [facility].  If requesting a specialty program, please choose from the list below.  If you have not heard from the scheduling office within 2 business days, please call [phone] for all locations, with the exception of [place], please call [phone] and [place], please call " "[phone]      Treatment: Evaluation & Treatment  Speech Treatment Diagnosis: Problems With Voice Production  Special Instructions: evaluate and treat - big and loud  Special Programs: Voice     Please be aware that coverage of these services is subject to the terms and limitations of your health insurance plan.  Call member services at your health plan with any benefit or coverage questions.      **Note to Provider:  If you are referring outside of [facility] for the therapy appointment, please list the name of the location in the \"special instructions\" above, print the referral and give to the patient to schedule the appointment.                  Follow-up notes from your care team     Return in about 3 months (around 6/15/2018).      Your next 10 appointments already scheduled     Mar 19, 2018  1:40 PM CDT   (Arrive by 1:25 PM)   Return Visit with  Prostate Cancer Ctr Nurse   [facility] ([facility])    [address] [place] [id number] [phone]            Mar 22, 2018 12:15 PM CDT   (Arrive by 12:00 PM)   Cystoscopy with Alyson Mcgee MD   [facility] ([facility])    [address] [place] [id number] [phone]            Jun 18, 2018 10:30 AM CDT   (Arrive by 10:15 AM)   New Movement Disorder with ED Balderrama CNP   [facility] ([facility])    [address] [place] [address]  [facility] [id number] [phone]              Who to contact     Please call your clinic at [phone] to:    Ask questions about your health    Make or cancel appointments    Discuss your medicines    Learn about your test results    Speak to your doctor            Additional Information About Your Visit        [facility] Information     [employer] gives you secure access to " "your electronic health record. If you see a primary care provider, you can also send messages to your care team and make appointments. If you have questions, please call your primary care clinic.  If you do not have a primary care provider, please call [phone] and they will assist you.      [employer] is an electronic gateway that provides easy, online access to your medical records. With [employer], you can request a clinic appointment, read your test results, renew a prescription or communicate with your care team.     To access your existing account, please contact your [facility] or call [phone] for assistance.        Care EveryWhere ID     This is your Care EveryWhere ID. This could be used by other organizations to access your [facility] medical records  [id number]        Your Vitals Were     Pulse Height BMI (Body Mass Index)             73 1.6 m (5' 3\") 23.12 kg/m2          Blood Pressure from Last 3 Encounters:   03/15/18 140/77   02/08/18 (!) 150/91   12/31/17 125/71    Weight from Last 3 Encounters:   03/15/18 59.2 kg (130 lb 8 oz)   02/08/18 59.8 kg (131 lb 14.4 oz)   12/31/17 57.6 kg (127 lb)              We Performed the Following     MED THERAPY MANAGE REFERRAL     NEUROPSYCHOLOGY REFERRAL     OCCUPATIONAL THERAPY REFERRAL     PHYSICAL THERAPY REFERRAL     SPEECH THERAPY REFERRAL          Today's Medication Changes          These changes are accurate as of 3/15/18 12:26 PM.  If you have any questions, ask your nurse or doctor.               These medicines have changed or have updated prescriptions.        Dose/Directions    acetaminophen 500 MG tablet   Commonly known as:  TYLENOL   This may have changed:    - when to take this  - reasons to take this   Changed by:  Keith Styles MD        Dose:  500 mg   Take 1 tablet (500 mg) by mouth every 4 hours as needed for mild pain   Refills:  0       ASPIRIN LOW DOSE 81 MG tablet   This may have changed:    - how " much to take  - how to take this  - when to take this  - additional instructions   Generic drug:  aspirin   Changed by:  Keith Styles MD        81mg tablet @ 7am   Quantity:  30 tablet   Refills:  0       carbidopa-levodopa  MG per tablet   Commonly known as:  SINEMET   This may have changed:  Another medication with the same name was removed. Continue taking this medication, and follow the directions you see here.   Used for:  Parkinsonism, unspecified Parkinsonism type (H)   Changed by:  Keith Styles MD        2 tabs @ 7am, 11am, 3pm and 8pm   Quantity:  720 tablet   Refills:  3       celeXA 40 MG tablet   This may have changed:    - how much to take  - how to take this  - when to take this  - additional instructions   Generic drug:  citalopram   Changed by:  Keith Styles MD        40 mg tablet by mouth @ 10pm   Quantity:  90 tablet   Refills:  0       lisinopril 2.5 MG tablet   Commonly known as:  PRINIVIL/Zestril   This may have changed:    - how much to take  - how to take this  - additional instructions   Changed by:  Keith Styles MD        2.5mg tablet by mouth @ 10pm   Quantity:  90 tablet   Refills:  3       polyethylene glycol Packet   Commonly known as:  MIRALAX/GLYCOLAX   This may have changed:    - when to take this  - reasons to take this   Changed by:  Keith Styles MD        Dose:  17 g   Take 17 g by mouth daily as needed for constipation   Quantity:  7 packet   Refills:  0       * QUEtiapine 100 MG tablet   Commonly known as:  SEROquel   This may have changed:  See the new instructions.   Changed by:  Keith Styles MD        100mg tablet @ 10pm; takes with a 25mg tablet   Quantity:  60 tablet   Refills:  11       * QUEtiapine 25 MG tablet   Commonly known as:  SEROquel   This may have changed:  You were already taking a medication with the same name, and this prescription was added. Make sure you understand how and when to take each.   Changed by:   Keith Styles MD        1 tab @ 7am 1 tablet @ 10pm (along with 100mg at bedtime)   Quantity:  60 tablet   Refills:  0       rOPINIRole 1 MG tablet   Commonly known as:  REQUIP   This may have changed:    - how much to take  - how to take this  - when to take this  - additional instructions  - Another medication with the same name was removed. Continue taking this medication, and follow the directions you see here.   Changed by:  Keith Styles MD        1mg tablet by mouth @ 7am, 11am and 3pm   Quantity:  270 tablet   Refills:  3       simvastatin 20 MG tablet   Commonly known as:  ZOCOR   This may have changed:    - how much to take  - how to take this  - when to take this  - additional instructions   Changed by:  Keith Styles MD        20mg tablet by mouth @ 10pm   Quantity:  30 tablet   Refills:  0       SUMAtriptan 25 MG tablet   Commonly known as:  IMITREX   This may have changed:    - when to take this  - reasons to take this   Changed by:  Keith Styles MD        Dose:  25 mg   Take 1 tablet (25 mg) by mouth at onset of headache for migraine   Quantity:  30 tablet   Refills:  0       SYNTHROID 50 MCG tablet   This may have changed:    - how much to take  - how to take this  - when to take this  - additional instructions   Generic drug:  levothyroxine   Changed by:  Keith Styles MD        50mcg tablet by mouth @ 7am   Quantity:  90 tablet   Refills:  11       vitamin D 2000 UNITS Caps   This may have changed:    - how to take this  - additional instructions   Changed by:  Keith Styles MD        2000 units @ 7am   Quantity:  30 capsule   Refills:  0       * Notice:  This list has 2 medication(s) that are the same as other medications prescribed for you. Read the directions carefully, and ask your doctor or other care provider to review them with you.             Primary Care Provider Office Phone # Fax #    [facility] [phone] [address]        Equal Access to Services     FERNANDA GARLAND : Hadii everett laws miriamshivani Vonali, joanrosalia sandovaljimmyha, qamannie roxanaitarosalia gomezraymundorosalia, waxgeorge kimmyin hayaamarcos gomezteresa ennisantonietta lange. So [facility] [phone].    ATENCIÓN: Si habla español, tiene a eller disposición servicios gratuitos de asistencia lingüística. [facility] [phone].    We comply with applicable federal civil rights laws and [place] laws. We do not discriminate on the basis of race, color, national origin, age, disability, sex, sexual orientation, or gender identity.            Thank you!     Thank you for choosing [facility] NEUROLOGY  for your care. Our goal is always to provide you with excellent care. Hearing back from our patients is one way we can continue to improve our services. Please take a few minutes to complete the written survey that you may receive in the mail after your visit with us. Thank you!             Your Updated Medication List - Protect others around you: Learn how to safely use, store and throw away your medicines at www.disposemymeds.org.          This list is accurate as of 3/15/18 12:26 PM.  Always use your most recent med list.                   Brand Name Dispense Instructions for use Diagnosis    acetaminophen 500 MG tablet    TYLENOL     Take 1 tablet (500 mg) by mouth every 4 hours as needed for mild pain        ASPIRIN LOW DOSE 81 MG tablet   Generic drug:  aspirin     30 tablet    81mg tablet @ 7am        carbidopa-levodopa  MG per tablet    SINEMET    720 tablet    2 tabs @ 7am, 11am, 3pm and 8pm    Parkinsonism, unspecified Parkinsonism type (H)       celeXA 40 MG tablet   Generic drug:  citalopram     90 tablet    40 mg tablet by mouth @ 10pm        lisinopril 2.5 MG tablet    PRINIVIL/Zestril    90 tablet    2.5mg tablet by mouth @ 10pm        polyethylene glycol Packet    MIRALAX/GLYCOLAX    7 packet    Take 17 g by mouth daily as needed for constipation        * QUEtiapine 100 MG tablet     SEROquel    60 tablet    100mg tablet @ 10pm; takes with a 25mg tablet        * QUEtiapine 25 MG tablet    SEROquel    60 tablet    1 tab @ 7am 1 tablet @ 10pm (along with 100mg at bedtime)        rOPINIRole 1 MG tablet    REQUIP    270 tablet    1mg tablet by mouth @ 7am, 11am and 3pm        simvastatin 20 MG tablet    ZOCOR    30 tablet    20mg tablet by mouth @ 10pm        SUMAtriptan 25 MG tablet    IMITREX    30 tablet    Take 1 tablet (25 mg) by mouth at onset of headache for migraine        SYNTHROID 50 MCG tablet   Generic drug:  levothyroxine     90 tablet    50mcg tablet by mouth @ 7am        vitamin D 2000 UNITS Caps     30 capsule    2000 units @ 7am        * Notice:  This list has 2 medication(s) that are the same as other medications prescribed for you. Read the directions carefully, and ask your doctor or other care provider to review them with you.

## 2018-03-15 NOTE — PATIENT INSTRUCTIONS
Parkinsonism           - discussed to make sure that her sinemet medication should be one hour before protein or two hours             after protein            - may need as needed sinemet for tremor  Mood disorder - anxiety she is relatively well managed with citalopram 40mg/day and seroquel 25mg in the morning and 125mg in the evening. She has not been on remeron or pimavanserin (nuplazid)  Driving issue  Cognitive issues    - neuropsychological evaluation was ordered but she may not proceed wit th is              - she has not had a formal moca   - she and her daughter was informed about the rivastigmine/exelon patch   Constipation -               - need to be more aggressive daily regimen - miralax or/and senokot as well as diet    Briefly discussed new treatments  rytary   nuplazid    Educational program  1st Saturday in June    She could meet with a pharmacist that works here - Zainab Sanches, Pharm D    She is doing an exercise program - she is walking in the summer and walks in the [place] in [facility] [employer]  She does go to the parkinson Classes.     Signed up for dimple     Return to see Alyssa Santos in 3-4 months  May need to call daughter at the time of the visit to help with discussion of treatment  We did this today and it worked well as daughter is home school ing 2/5 kids.        Medications     7am 11am 3pm 8pm 10pm     Acetaminophen 500mg prn             Aspirin 81mg 1             Carbidopa/levodopa 25/100 Sinemet  2 2 2 2       Cholecalciferol vitamin d2000 1             Citalopram celexa 40mg 1             Levothyroxine synthroid 50 mcg 1             Lisinopril prinivil/zestril 2.5mg         1     Polyethylene glycol miralax As needed             Quetiapine 100mg seroquel         1     Quetiapine 25mg seroquel 1       1     Ropinirole requip 1mg 1 1 1         Simvastatin zocor 20mg         1     Sumatriptan imitrex 25mg As needed                                                  Keith Styles MD

## 2018-03-15 NOTE — LETTER
3/15/2018       RE: Paige Mercado  [address] [facility] [address]     Dear Colleague,    Thank you for referring your patient, Paige Mercado, to the [facility] NEUROLOGY at [facility]. Please see a copy of my visit note below.    Summary and Recommendations:     Parkinsonism           - discussed to make sure that her sinemet medication should be one hour before protein or two hours             after protein            - may need as needed sinemet for tremor  Mood disorder - anxiety she is relatively well managed with citalopram 40mg/day and seroquel 25mg in the morning and 125mg in the evening. She has not been on remeron or pimavanserin (nuplazid)  Driving issue  Cognitive issues    - neuropsychological evaluation was ordered but she may not proceed wit th is              - she has not had a formal moca   - she and her daughter was informed about the rivastigmine/exelon patch   Constipation -               - need to be more aggressive daily regimen - miralax or/and senokot as well as diet    Briefly discussed new treatments  rytary   nuplazid    Educational program  1st Saturday in June    She could meet with a pharmacist that works here - Zainab Sanches, Pharm D    She is doing an exercise program - she is walking in the summer and walks in the dome in [facility] [employer]  She does go to the parkinson Classes.     Signed up for dimple     Return to see Alyssa Santos in 3-4 months  May need to call daughter at the time of the visit to help with discussion of treatment  We did this today and it worked well as daughter is home school ing 2/5 kids.        Medications     7am 11am 3pm 8pm 10pm     Acetaminophen 500mg prn             Aspirin 81mg 1             Carbidopa/levodopa 25/100 Sinemet  2 2 2 2       Cholecalciferol vitamin d2000 1             Citalopram celexa 40mg 1             Levothyroxine synthroid 50 mcg 1             Lisinopril prinivil/zestril 2.5mg         1      Polyethylene glycol miralax As needed             Quetiapine 100mg seroquel         1     Quetiapine 25mg seroquel 1       1     Ropinirole requip 1mg 1 1 1         Simvastatin zocor 20mg         1     Sumatriptan imitrex 25mg As needed                                                    Keith Styles MD  _____________________________________________________________________  PATIENT: Paige Mercado  72 year old female   : 1945  VLADIMIR: March 15, 2018    Consult requested by pcp/other  Outside records reviewed and revealed  - inserted.   History obtained from patient  History of Present Illness  71 yo woman initially seen by Dr. Palm in  and most recently in  - details from two visits at end of note.  Diagnosed in   It was 2012    She was referred by another patient RENA Fenton  She needs to take a test about driving issue at [facility]  May be seeing an OT at [place] for an evaluation    She has not had falls  She has not had hallucinations  She has done physical therapy at [facility]  Wears glasses. She has signed up may 1, 2018 right and then left cataract surgery.  Denies hearing loss  Has some constipation and uses miralax as needed and may have one bowel movement per week  Has bladder issues  -  She is not wearing a pad. She has urgency and when she is zippping her pants.   Will see urology Dr Mtz. She has nocturia 1-2/noc  Falls asleep okay and can go back to sleep  No clear snoring. She does not know if she has involuntary movements at night  No family history of parkinson  She has problems with her balance on her bike  She has had loss of smell  She has left? Shoulder problem  Right handed with onset on the left side  Nonsmoker. Denies lung problems  Recent cold  Heart - has been taking blood pressure medication for a while  Endo: - she has been on thyroid medications for a long time. Denies diabetes  She has been on cholesterol medication for about 10 yrs  Denies   Anemia  cipro allergy and sulfa allergy  Lost 40 lbs in 2 months.  Has not seen gi  Has imitrex on her medication list - had migraines in the past which resolved with menopause  Mood issues. 17 yrs of mood problems  Has a doctor she sees at [facility] Dr Maria Del Carmen Mackay  She had been weaned off xanax and had an increased dose of her quetiapine   She has had problems with bladder infections which has caused confusion - this was in the summer and again around the turn of year December/January     1. Forgetfullness/memory/word finding problems  2. New Rxs  3. Constipation  4. Eliminate pills if possible  5. Handwriting  6. Tremor    Spoke with daughter (on phone) with patient in the visit.       Medications     7am 11am 3pm 8pm 10pm    Acetaminophen 500mg prn        Aspirin 81mg 1        Carbidopa/levodopa 25/100 Sinemet  2 2 2 2     Cholecalciferol vitamin d2000 1        Citalopram celexa 40mg 1        Levothyroxine synthroid 50 mcg 1        Lisinopril prinivil/zestril 2.5mg     1    Polyethylene glycol miralax As needed        Quetiapine 100mg seroquel     1    Quetiapine 25mg seroquel 1    1    Ropinirole requip 1mg 1 1 1      Simvastatin zocor 20mg     1    Sumatriptan imitrex 25mg As needed                              14 Review of systems  are negative except for   Patient Active Problem List   Diagnosis     Allergic rhinitis     Anxiety     Conversion disorder     Depression     Essential hypertension     Generalized anxiety disorder     H/O bone density study     HTN (hypertension), benign     Hyperlipidemia     Hyperplastic polyps of stomach     Hypothyroidism     Lumbago     Migraine     Osteoarthrosis     Parkinson disease (H)     Parkinson's disease (H)     Parotid mass     PD (Parkinson's disease) (H)     Pyelonephritis     Prediabetes     S/P colonoscopy     Warthin's tumor        Allergies   Allergen Reactions     Ciprofloxacin GI Disturbance     Sulfa Drugs GI Disturbance     Past Surgical History:    Procedure Laterality Date     GYN SURGERY      hysterectomy     Past Medical History:   Diagnosis Date     Cancer (H)     breast     Social History     Social History     Marital status: Unknown     Spouse name: N/A     Number of children: N/A     Years of education: N/A     Occupational History     Not on file.     Social History Main Topics     Smoking status: Never Smoker     Smokeless tobacco: Never Used     Alcohol use No     Drug use: No     Sexual activity: Not on file     Other Topics Concern     Not on file     Social History Narrative     No family history on file.  Current Outpatient Prescriptions   Medication Sig Dispense Refill     acetaminophen (TYLENOL) 500 MG tablet Take 500 mg by mouth       carbidopa-levodopa (SINEMET CR)  MG per CR tablet Take 1 tablet by mouth       lisinopril (PRINIVIL/ZESTRIL) 2.5 MG tablet Take 2.5 mg by mouth       polyethylene glycol (MIRALAX/GLYCOLAX) Packet Take 17 g by mouth       QUEtiapine (SEROQUEL) 100 MG tablet Take 100 mg by mouth       QUEtiapine (SEROQUEL) 25 MG tablet Take 1 tabs in the morning and 1 tab at bedtime. (takes with a 100 mg tab at hs). DOSE INCREASE.       rOPINIRole (REQUIP) 1 MG tablet Take 1 mg by mouth       SUMAtriptan (IMITREX) 25 MG tablet Take 25 mg by mouth       levothyroxine (SYNTHROID) 50 MCG tablet Take 50 mcg by mouth daily.       aspirin (ASPIRIN LOW DOSE) 81 MG tablet Take 1 tablet by mouth daily.       Cholecalciferol (VITAMIN D) 2000 UNIT CAPS Take  by mouth.       simvastatin (ZOCOR) 20 MG tablet Take 20 mg by mouth At Bedtime.       citalopram (CELEXA) 40 MG tablet Take 40 mg by mouth daily.       Examination  B/P: Data Unavailable, T: Data Unavailable, P: Data Unavailable, R: Data Unavailable 0 lbs 0 oz  There were no vitals taken for this visit., There is no height or weight on file to calculate BMI.    Vitals signs were added and reviewed if not above. Please refer to the chart from this visit.    General examination:  well developed, nourished and normal affect  Carotid: No bruits. Chest CTA, Heart regular without gallops or murmurs. Abdomen soft nontender, no masses, bowel sounds intact. Periphery: normal pulses without edema. No skin lesions. MENTAL STATUS:  Alert, oriented x - unable to state the month but can state the day of week and year.  Speech fluent with normal naming, repetition, comprehension.  Good right-left orientation, Can remember 2/3 objects. Able to spell world backwards.   CRANIAL NERVES:  Disks flat. Pupils are equal, round, reactive to light.  Normal vascularity and fields. Extraocular movements full.  Facial sensation and movement normal.  Hearing intact. Palate moves symmetrically.  Tongue midline.  Sternocleidomastoid and trapezius strength intact.  Neck strength was normal.  NEUROLOGIC:  Tone: slight increased tone. Motor in upper and lower extremities. 5/5.  Reflexes 2/4.  Toe signs downgoing.  Good finger-nose-finger, fine finger movement, heel-shin maneuver, sensation to light touch, position sense and vibration and temperature was normal. Gait normal. Romberg and postural stability intact. Tremor  Bilateral resting tremor.     Progress Notes  - in this encounter    Librado Mcghee DO - 2017 1:33 PM CST      NAME: TRUPTI HICKS MR#: [id number]  CSN: [id number]  AUTHENTICATING CLINICIAN: Librado Mcghee DO  CONFIRM #: [id number]  LOC: 223    CLINIC PROGRESS NOTE    DATE OF VISIT: 2017  : 1945    Mrs. Hicks returns for followup. She has Parkinson's disease. Her big issue for the day is the fact that she has had her driving privileges curtailed. This is on the strength of an OT evaluation for which she did not do well.     She has abided by this decision, but is quite unhappy.     From a Parkinson's point of view, she is doing pretty well. Speech is normal. No problems with drooling. No particular problems using eating utensils or taking care of her other activities of daily living. Has  not had any falls.     She does have anxiety and is working with Psychiatry. For this she takes Seroquel 100 mg at night and 25 mg twice during the day.     She is also on Celexa 40 mg a day.     She is on carbidopa/levodopa immediate release 25/100 size tablets 2 tablets at 7 a.m., 11 a.m., 3 p.m., and 8 p.m. She is also on ropinirole 1 mg 3 times a day.     There have been no hallucinations.     She is somewhat slowed in her movements, but easy to understand in her speech. Extraocular movements are full. Face moves symmetrically. Gait ignition, posture actually pretty good. Turns are adequate, showing no postural instability.    IMPRESSION:  Parkinson's disease. Reasonably well medicated and enjoying fairly normal movements. I will leave her Parkinson's medicines the same.     The big issue for the day is that of her restricted driving. There were abnormalities on her pre-driving OT assessment which would suggest significant risk. I cannot sign off on her driving. She would like to contest this. The way forward is a formal driving evaluation. Only if she passed with reasonable facility would I consider allowing her to drive again. This was discussed in no uncertain terms. I will discuss this with Dr. Villaseñor. I will see her back in followup in 4 months' time.     TT: 30. CT: 25.    CARMELO:MICHAEL C:   D:17 13:33 T: 17 13:58 CONFIRM #: [id number]      Progress Notes  - in this encounter    Librado Mcghee DO - 2012 11:41 AM CST  Formatting of this note may be different from the original.  Progress Notes signed by Librado Mcghee DO at 12 1200   Author: Librado Mcghee DO Service: (none) Author Type: Physician   Filed: 12 1200 Note Time: 12 1141 Status: Signed   : Librado Mcghee DO (Physician)       NAME: TRUPTI HICKS MR#: [id number]  CSN: [id number]  AUTHENTICATING CLINICIAN: Librado Mcghee DO  CONFIRM #: [id number]  LOC: 223    CLINIC PROGRESS NOTE    DATE OF VISIT: 2012  :  1945    I am seeing Mrs. Mercado at the request of Dr. Villaseñor. Patient has history of tremor. She is felt to have a postural tremor, probably essential tremor of her outstretched hand. This was pointed out to her perhaps 6 or 7 months ago, she really did not notice it. But since that time, she has noticed it, usually when she is doing something with her hand. Does not freeze, festinate or fall. She drags her left foot on occasion.    PAST MEDICAL HISTORY:  Has a history of constipation for number of years. Also treated for depression/anxiety. No hallucinations. No cognitive changes. No sleep disturbance that she knows of. She is treated for hypertension, hyperlipidemia, hypothyroidism. History of migraine.    MEDICATIONS:  Normally takes p.r.n. alprazolam, aspirin a day. Otherwise on Celexa 40 mg a day. Zocor, Synthroid, p.r.n. Imitrex.    ALLERGIC:  Sulfa, ciprofloxacin.    SOCIAL HISTORY:  Nonsmoker. . Retired teacher.    PHYSICAL EXAMINATION:  She has a decreased blink frequency but reasonably normal voice volume. She has a high-frequency, subtle low amplitude tremor of her outstretched hand on the left, maybe a hint of such on the right, but it is fairly asymmetric. She does have a rest tremor of her left hand which is different amplitude, different frequency, being higher amplitude and slower frequency. Finger taps show some mild impairment as the longer she goes the smaller the amplitude becomes and the slower is the tap right. Same is true for rapid finger flexion extension, rapid alternating supination and pronation. She has a definite rigidity on the left with reinforcement maneuvers. Finger taps and tone are normal on the right. She has decreased heel taps on the left versus the right. Able to cross her arms over   chest and stand without difficulty. Initiates her gait without freezing or festinating. Stride length is good, but she has decreased arm swing on the left with re-emergent tremor of  her left hand which is the slower frequency, higher amplitude tremor previously described. Gross power is normal throughout. Cranial nerves are intact. No tremor of her chin noted. Gross sensory exam is intact. Reflexes symmetric for biceps, triceps, brachioradialis, knee and ankle jerks. Toes are downgoing. Finger-nose-finger is intact.    ASSESSMENT:  Movement disorder with akinetic rigid features. She has actually 2 kinds of tremor. She has a high-frequency low-amplitude tremor which is more essential tremor like, but I think the more worrisome part of her exam is rest tremor that I am noticing which is subtle and intermittent but definitely present as well as bradykinesia and rigidity. This bespeaks to a Parkinson's-like state.     I discussed this with her. Naturally she is sad about the way our discussion went, but I gave her reason for not pushing the panic button and allowing me to image her head with an MRI and get a full evaluation through [facility]'s [place] with PT, OT, speech, family, and Nursing Services. I will then discuss medicines with her when I see her in followup. I do not think that she needs the medicines from a safety and perhaps not from a functional point of view at this point in time.     Will discuss this with primary care.    CARMELO:MEDQ C:   D:11/13/12 11:41 T: 11/13/12 21:48 CONFIRM #: [id number]       Allergies    Active Allergy Reactions Severity Noted Date Comments   Ciprofloxacin     04/22/2005 PN: LW Reaction: NERVOUSNESS     Sulfa Antibiotics     05/10/2004 PN: LW Reaction: GI Upset     Current Medications    Prescription Sig. Disp. Refills Start Date End Date Status   aspirin 81 MG tablet   Take 1 tablet by mouth daily (every 24 hours). 90   3 03/30/2004   Active   Cholecalciferol 2000 UNITS   Take 1 capsule by mouth daily (every 24 hours).     12/18/2012   Active   SUMAtriptan (IMITREX) 25 MG tablet    Indications: Parkinson disease (HRC) Take 1 tablet by mouth once as  needed. 9 tablet   3 05/09/2013   Active   acetaminophen (TYLENOL) 500 MG tablet   Take 500 mg by mouth every 4 hours as needed for Pain. Maximum 4000mg per 24 hours     03/10/2015   Active   polyethylene glycol (MIRALAX) packet   Take 17 g by mouth daily as needed for Constipation. Reported on 4/10/2017         Active   carbidopa-levodopa (SINEMET)  MG tablet    Indications: Parkinson disease (HRC) Take 2 Tabs by mouth 4 times a day. [id number] 720 Tab   3 06/06/2017   Active   lisinopril (ZESTRIL) 2.5 MG tablet   Take 1 Tab by mouth daily. 90 Tab   2 11/28/2017   Active   QUEtiapine (SEROQUEL) 100 MG tablet   Take 1 Tab by mouth daily at bedtime. takes with a 25 mg tab at hs - Future refills 90 Tab   3 12/12/2017   Active   hydrocortisone (PROCTOSOL-HC) 2.5 % rectal cream   Insert rectally two times a day. 30 g   0 12/18/2017   Active   QUEtiapine (SEROQUEL) 25 MG tablet   Take 2 tabs in the morning and 1 tab at bedtime. (takes with a 100 mg tab at hs). DOSE INCREASE. 270 Tab   0 02/28/2018   Active   rOPINIRole (REQUIP) 1 MG tablet   TAKE 1 TABLET THREE TIMES DAILY 270 Tab   2 03/02/2018   Active   levothyroxine (SYNTHROID) 50 MCG tablet   TAKE 1 TABLET EVERY DAY 90 Tab   0 03/01/2018   Active   citalopram (CELEXA) 40 MG tablet   TAKE 1 TABLET EVERY DAY 90 Tab   1 03/01/2018   Active   simvastatin (ZOCOR) 20 MG tablet   TAKE 1 TABLET EVERY EVENING 90 Tab   1 03/01/2018   Active   rOPINIRole (REQUIP) 1 MG tablet   Take 1 Tab by mouth three times a day. 270 Tab   3 03/07/2017 03/01/2018 Discontinued   citalopram (CELEXA) 40 MG tablet   Take 1 Tab by mouth daily. 90 Tab   3 05/02/2017 03/01/2018 Discontinued   levothyroxine (SYNTHROID) 50 MCG tablet   Take 1 Tab by mouth daily. 90 Tab   3 05/02/2017 03/01/2018 Discontinued   simvastatin (ZOCOR) 20 MG tablet   Take 1 Tab by mouth every evening. 90 Tab   3 05/02/2017 03/01/2018 Discontinued   QUEtiapine (SEROQUEL) 25 MG tablet   Take 1 tabs in the morning and  1 tab at bedtime. (takes with a 100 mg tab at hs). DOSE INCREASE. 180 Tab   0 01/16/2018 02/28/2018 Discontinued   Active Problems    Problem Noted Date   Warthin's tumor 01/17/2013   Essential hypertension ([facility]) 01/05/2013   Parkinson's disease ([facility]) 01/05/2013   Parotid mass 11/16/2012   Overview:     1.5cm on open side MRI [address]  Fax        Generalized anxiety disorder ([facility]) 10/24/2012   Pyelonephritis 03/27/2008   Overview:     : hospitalization  : 2007       Osteoarthritis 01/20/2005   Overview:     DJD     Migraine 01/20/2005   Overview:     Migraine NOS     CONVERSION DX 01/20/2005   Overview:     LW Uncoded Problem, needs review: SI dysfunction     CONVERSION DX 01/20/2005   Overview:     LW Uncoded Problem, needs review: bone scan last was 2002     Essential hypertension ([facility]) 06/07/2003   Overview:     Hypertension     Hyperlipidemia ([facility]) 06/07/2003   Hypothyroidism ([facility]) 06/07/2003   Overview:     Hypothyroidism Acquired     Allergic rhinitis 06/07/2003   Overview:     Rhinitis Allergic NOS     Lumbago ([facility]) 06/07/2003   Overview:     Pain Low Back     Parkinson disease ([facility])     Overview:     11/2012       Anxiety ([facility])     H/O bone density study     Overview:     2009 due again 2017       S/P colonoscopy     Overview:     last was 2014 due again in 2024       Hyperplastic polyps of stomach     Overview:     due 2024 ( I do not see record of this in medical record or in scan doc-   reverify)        Prediabetes     Overview:     A1C 5.7     Resolved Problems    Problem Noted Date Resolved Date   Health care home, active care coordination 12/04/2015 12/30/2016   Overview:     Care Coordinator: Richard Oliver [phone]   Care coordination focus: Coordination  Living situation: Paige lives independently in a single family home.  Important notes:   See care plan under Chart Review > Misc Reports > [facility] CARE PLAN REPORT       Pneumonia due to organism 01/20/2005  06/09/2014   Overview:     : 11/2000 requiring hosptialization  ; Pneumonia NOS     Anxiety state ([facility]) 09/22/2004 02/05/2006   Overview:     LW Onset: 50Ile80  ; Anxiety NOS     Most Recent Encounters    Date Type Specialty Care Team Description   03/01/2018 Refill Internal Medicine Nolvia Villaseñor MD   Refill (levothyroxine (SYNTHROID) 50 MCG tablet [Pharmacy Med Name: LEVOTHYROXINE SODIUM 50 MCG Tablet]; citalopram (CELEXA) 40 MG tablet [Pharmacy Med Name: CITALOPRAM HYDROBROMIDE 40 MG Tablet]; simvastatin (ZOCOR) 20 MG tablet [Pharmacy Med Name: SIMVASTATIN 20 MG Tablet])   03/01/2018 Refill Neurology Librado Mcghee DO   Refill (Requip 1mg)   02/28/2018 Telephone Internal Medicine Nolvia Villaseñor MD   Forms (PT - Plan of Care)   Immunizations  Reconcile with Patient's Chart    Name Dates Previously Given Next Due   DPT-Historical 06/13/1989     Flu Vac Preserv Free (3+yrs) 10/02/2012, 10/30/2010, 09/09/2009, 10/01/2008, 10/15/2007, 11/14/2005, 12/03/2004     H1n1 Miv Sanofi 3+ Yr (Injected) 12/31/2009     HepA-HepB (TWINRIX, 18+ yrs) 06/26/2008, 02/06/2008, 01/02/2008     Influenza (Fluarix 0.5, 3+ yrs or FluLaval 0.5) 10/12/2013     Influenza Vaccine - Historical 10/12/2016, 10/20/2003, 11/06/2002, 10/18/2001, 12/15/2000, 10/22/1993     PCV13 (Prevnar) 07/27/2015     PPSV23 (Pneumovax) 04/25/2012, 12/23/2008     TDAP (ADACEL) 04/25/2012     Td 03/19/2002, 06/16/1992     Surgical History    Surgery Date Laterality Comments   TONSILLECTOMY         HEMORRHOIDECTOMY         HYSTERECTOMY         BREAST SURGERY     right mastectomy   SHOULDER ARTHROSCOPY         SALIVARY SURG UNLISTED PROC         PAROTIDECTOMY     L 2012 [place] Tumor    MASTECTOMY 01/01/2001 Right right breast cancer   Medical History    Medical History Date Comments   History of breast cancer   right mastectomy Ductal cancer ERPR positive   Hypertension (HRC)       Hyperlipidemia ([facility]C)   Goal less than 100   Hypothyroid (HRC)       Pneumonia    hospitalization 2000   S/P colonoscopy   last was  due again in    Pseudogout   knee pain calcium pyrophosphate crystals   Migraine   Rare. Quiescent   Pneumonia  hospitalization   Pyelonephritis  hospitalization   Parkinson disease ([facility])   2012   Parotid mass 12 L side parotid mass Parotidectomy Warthins tumor.    Anxiety ([facility])       Warthin tumor  Removed surgically left side neck   Hyperplastic polyps of stomach  due  ( I do not see record of this in medical record or in scan doc- reverify probably meant to be hyperplastic colon polyps?)    H/O bone density study    due again 2017   Cancer ([facility])   Breast   Prediabetes   A1C 5.7   Breast cancer ([facility]) 2001 rt   Family History    Medical History Relation Name Comments   cancer,throat Birth Father    unknown cause historyof throat cancer   diabetes type 2 Birth Father   diabetes type 2   Other Birth Mother       Stroke Birth Mother       cancer,kidney Birth Mother   renal cell cancer   cancer,uterus Maternal Aunt       Myocardial Infarction Maternal Grandfather   80s   Cancer, Breast Paternal Aunt       Other         Stroke     grandmother 80s   Cancer, Ovary Negative Family History         Relation Name Status Comments   Birth Father         Birth Mother         Maternal Aunt         Maternal Grandfather         Paternal Aunt         Social History    Tobacco Use Types Packs/Day Years Used Date   Former Smoker       Quit: 1973   Smokeless Tobacco: Never Used           Comments: Quit smoking:     Alcohol Use Drinks/Week oz/Week Comments   Yes 1 Cans of beer   0.6  One or two drinks a month.     Sex Assigned at Birth Date Recorded   Not on file       Again, thank you for allowing me to participate in the care of your patient.      Sincerely,    Keith Styles MD

## 2018-03-16 NOTE — TELEPHONE ENCOUNTER
MTM referral from: [place] clinic visit (referral by provider)    MTM referral outreach attempt #2 on March 16, 2018 at 8:04 AM      Outcome: Spoke with patient who returned call to schedule and is going to find a ride and call us back to schedule.    Chelsea Spangler MTM Coordinator

## 2018-03-19 ENCOUNTER — ALLIED HEALTH/NURSE VISIT (OUTPATIENT)
Dept: UROLOGY | Facility: CLINIC | Age: 73
End: 2018-03-19
Payer: COMMERCIAL

## 2018-03-19 DIAGNOSIS — N39.0 RECURRENT UTI: Primary | ICD-10-CM

## 2018-03-19 LAB
ALBUMIN UR-MCNC: NEGATIVE MG/DL
APPEARANCE UR: CLEAR
BILIRUB UR QL STRIP: NEGATIVE
COLOR UR AUTO: YELLOW
GLUCOSE UR STRIP-MCNC: NEGATIVE MG/DL
HGB UR QL STRIP: NEGATIVE
KETONES UR STRIP-MCNC: 5 MG/DL
LEUKOCYTE ESTERASE UR QL STRIP: NEGATIVE
MUCOUS THREADS #/AREA URNS LPF: PRESENT /LPF
NITRATE UR QL: NEGATIVE
PH UR STRIP: 7 PH (ref 5–7)
RBC #/AREA URNS AUTO: 1 /HPF (ref 0–2)
RENAL EPI CELLS #/AREA URNS HPF: 2 /HPF
SOURCE: ABNORMAL
SP GR UR STRIP: 1.02 (ref 1–1.03)
SQUAMOUS #/AREA URNS AUTO: <1 /HPF (ref 0–1)
UROBILINOGEN UR STRIP-MCNC: 0 MG/DL (ref 0–2)
WBC #/AREA URNS AUTO: <1 /HPF (ref 0–5)

## 2018-03-19 NOTE — NURSING NOTE
Paige Mercado comes into clinic today at the request of Dr. Alyson Mcgee Ordering Provider for UA/UC cath specimen.    A catheterized UA/UC was obtained in a sterile fashion using a 14 F female catheter.  Sample was sent to the lab for analysis.  Patient tolerated procedure well.    This service provided today was under the supervising provider of the day Mirella Carbajal PA-C, who was available if needed.    PAOLA Newsome

## 2018-03-19 NOTE — PATIENT INSTRUCTIONS
Follow up with Dr. Mcgee on Thursday for your cystoscopy.    If your urine comes back abnormal, Dr. Mcgee will contact you.    It was a pleasure meeting with you today.  Thank you for allowing me and my team the privilege of caring for you today.  YOU are the reason we are here, and I truly hope we provided you with the excellent service you deserve.  Please let us know if there is anything else we can do for you so that we can be sure you are leaving completely satisfied with your care experience.      PAOLA Newsome

## 2018-03-19 NOTE — MR AVS SNAPSHOT
After Visit Summary   3/19/2018    Paige Mercado    MRN: [id number]           Patient Information     Date Of Birth          1945        Visit Information        Provider Department      3/19/2018 1:40 PM Nurse,  [facility]rolog[facility]        Today's Diagnoses     Recurrent UTI    -  1      Care Instructions    Follow up with Dr. Mcgee on Thursday for your cystoscopy.    If your urine comes back abnormal, Dr. Mcgee will contact you.    It was a pleasure meeting with you today.  Thank you for allowing me and my team the privilege of caring for you today.  YOU are the reason we are here, and I truly hope we provided you with the excellent service you deserve.  Please let us know if there is anything else we can do for you so that we can be sure you are leaving completely satisfied with your care experience.      PAOLA Newsome          Follow-ups after your visit        Your next 10 appointments already scheduled     Mar 21, 2018 10:30 AM CDT   [facility]    [address] [place] [address] [phone]           Note: this is not an onsite visit; there is no need to come to the facility.            Mar 22, 2018 12:15 PM CDT   (Arrive by 12:00 PM)   Cystoscopy with Alyson Mcgee MD   [facility] Prostate and Urologic Cancers ([facility])    [address] [place] [address] [phone]            Jun 18, 2018 10:30 AM CDT   (Arrive by 10:15 AM)   New Movement Disorder with ED Balderrama [facility] ([facility])    [address] [place] [address] [phone]              Who to contact     Please call your clinic at [phone] to:    Ask questions about your  health    Make or cancel appointments    Discuss your medicines    Learn about your test results    Speak to your doctor            Additional Information About Your Visit        [employer] Software Information     Rangespan gives you secure access to your electronic health record. If you see a primary care provider, you can also send messages to your care team and make appointments. If you have questions, please call your primary care clinic.  If you do not have a primary care provider, please call [phone] and they will assist you.      Rangespan is an electronic gateway that provides easy, online access to your medical records. With Rangespan, you can request a clinic appointment, read your test results, renew a prescription or communicate with your care team.     To access your existing account, please contact your [facility] or call [phone] for assistance.        Care EveryWhere ID     This is your Care EveryWhere ID. This could be used by other organizations to access your [facility] medical records  [id number]         Blood Pressure from Last 3 Encounters:   03/15/18 140/77   02/08/18 (!) 150/91   12/31/17 125/71    Weight from Last 3 Encounters:   03/15/18 59.2 kg (130 lb 8 oz)   02/08/18 59.8 kg (131 lb 14.4 oz)   12/31/17 57.6 kg (127 lb)              We Performed the Following     Routine UA with microscopic - No culture     Urine Culture Aerobic Bacterial        Primary Care Provider Office Phone # Fax #    [facility] [phone]       [address] [place] [address]        Equal Access to Services     FERNANDA GARLAND : Hadii aad ku hadasho Soomaali, waaxda luqadaha, qaybta kaalmada adeegyada, valerie mena . So [facility] [phone].    ATENCIÓN: Si habla español, tiene a eller disposición servicios gratuitos de asistencia lingüística. Llame al [phone].    We comply with applicable federal civil rights laws and  [place] laws. We do not discriminate on the basis of race, color, national origin, age, disability, sex, sexual orientation, or gender identity.            Thank you!     Thank you for choosing [facility]Y AND [facility] FOR PROSTATE AND UROLOGIC CANCERS  for your care. Our goal is always to provide you with excellent care. Hearing back from our patients is one way we can continue to improve our services. Please take a few minutes to complete the written survey that you may receive in the mail after your visit with us. Thank you!             Your Updated Medication List - Protect others around you: Learn how to safely use, store and throw away your medicines at www.disposemymeds.org.          This list is accurate as of 3/19/18  1:55 PM.  Always use your most recent med list.                   Brand Name Dispense Instructions for use Diagnosis    acetaminophen 500 MG tablet    TYLENOL     Take 1 tablet (500 mg) by mouth every 4 hours as needed for mild pain        ASPIRIN LOW DOSE 81 MG tablet   Generic drug:  aspirin     30 tablet    81mg tablet @ 7am        carbidopa-levodopa  MG per tablet    SINEMET    720 tablet    2 tabs @ 7am, 11am, 3pm and 8pm    Parkinsonism, unspecified Parkinsonism type (H)       celeXA 40 MG tablet   Generic drug:  citalopram     90 tablet    40 mg tablet by mouth @ 10pm        lisinopril 2.5 MG tablet    PRINIVIL/Zestril    90 tablet    2.5mg tablet by mouth @ 10pm        polyethylene glycol Packet    MIRALAX/GLYCOLAX    7 packet    Take 17 g by mouth daily as needed for constipation        * QUEtiapine 100 MG tablet    SEROquel    60 tablet    100mg tablet @ 10pm; takes with a 25mg tablet        * QUEtiapine 25 MG tablet    SEROquel    60 tablet    1 tab @ 7am 1 tablet @ 10pm (along with 100mg at bedtime)        rOPINIRole 1 MG tablet    REQUIP    270 tablet    1mg tablet by mouth @ 7am, 11am and 3pm        simvastatin 20 MG tablet    ZOCOR    30 tablet    20mg tablet by mouth @  10pm        SUMAtriptan 25 MG tablet    IMITREX    30 tablet    Take 1 tablet (25 mg) by mouth at onset of headache for migraine        SYNTHROID 50 MCG tablet   Generic drug:  levothyroxine     90 tablet    50mcg tablet by mouth @ 7am        vitamin D 2000 UNITS Caps     30 capsule    2000 units @ 7am        * Notice:  This list has 2 medication(s) that are the same as other medications prescribed for you. Read the directions carefully, and ask your doctor or other care provider to review them with you.

## 2018-03-20 ENCOUNTER — HOSPITAL ENCOUNTER (OUTPATIENT)
Dept: PHYSICAL THERAPY | Age: 73
Setting detail: THERAPIES SERIES
Discharge: STILL ADMITTED OR ADMINISTRATIVE | End: 2018-03-20
Attending: PHYSICAL THERAPIST

## 2018-03-20 DIAGNOSIS — R29.3 POSTURAL INSTABILITY: ICD-10-CM

## 2018-03-20 DIAGNOSIS — R26.81 GAIT INSTABILITY: ICD-10-CM

## 2018-03-21 ENCOUNTER — ALLIED HEALTH/NURSE VISIT (OUTPATIENT)
Dept: PHARMACY | Facility: CLINIC | Age: 73
End: 2018-03-21
Payer: COMMERCIAL

## 2018-03-21 DIAGNOSIS — G43.809 OTHER MIGRAINE WITHOUT STATUS MIGRAINOSUS, NOT INTRACTABLE: ICD-10-CM

## 2018-03-21 DIAGNOSIS — F29 PSYCHOSIS, UNSPECIFIED PSYCHOSIS TYPE (H): ICD-10-CM

## 2018-03-21 DIAGNOSIS — F32.A DEPRESSION, UNSPECIFIED DEPRESSION TYPE: ICD-10-CM

## 2018-03-21 DIAGNOSIS — E78.5 HYPERLIPIDEMIA, UNSPECIFIED HYPERLIPIDEMIA TYPE: ICD-10-CM

## 2018-03-21 DIAGNOSIS — F41.9 ANXIETY: ICD-10-CM

## 2018-03-21 DIAGNOSIS — K59.00 CONSTIPATION, UNSPECIFIED CONSTIPATION TYPE: ICD-10-CM

## 2018-03-21 DIAGNOSIS — G20.A1 PD (PARKINSON'S DISEASE) (H): Primary | ICD-10-CM

## 2018-03-21 DIAGNOSIS — N39.0 RECURRENT UTI: ICD-10-CM

## 2018-03-21 DIAGNOSIS — E03.9 HYPOTHYROIDISM, UNSPECIFIED TYPE: ICD-10-CM

## 2018-03-21 DIAGNOSIS — G47.00 INSOMNIA, UNSPECIFIED TYPE: ICD-10-CM

## 2018-03-21 LAB
BACTERIA SPEC CULT: NO GROWTH
Lab: NORMAL
SPECIMEN SOURCE: NORMAL

## 2018-03-21 PROCEDURE — 99607 MTMS BY PHARM ADDL 15 MIN: CPT | Performed by: PHARMACIST

## 2018-03-21 PROCEDURE — 99605 MTMS BY PHARM NP 15 MIN: CPT | Performed by: PHARMACIST

## 2018-03-21 NOTE — MR AVS SNAPSHOT
After Visit Summary   3/21/2018    Paige Mercado    MRN: [id number]           Patient Information     Date Of Birth          1945        Visit Information        Provider Department      3/21/2018 10:30 AM [facility]        Today's Diagnoses     PD (Parkinson's disease) (H)    -  1    Depression, unspecified depression type        Anxiety        Insomnia, unspecified type        Psychosis, unspecified psychosis type        Hypothyroidism, unspecified type        Hyperlipidemia, unspecified hyperlipidemia type        Other migraine without status migrainosus, not intractable        Constipation, unspecified constipation type        Recurrent UTI          Care Instructions    Recommendations from today's MTM visit:                                                    MTM (medication therapy management) is a service provided by a clinical pharmacist designed to help you get the most of out of your medicines.      1. We discussed switching from quetiapine to pimavanserin (Nuplazid) to manage hallucinations because it is approved for Parkinson's Disease and may have less side effects. This should also be discussed with your psychiatrist.    2. We also discussed adding buspirone (Buspar) to help manage your anxiety. Both of these recommendations for Nuplazid and buspirone were given to Dr. Mackay and I am waiting to hear back from her.    3. We can consider trying rivastigimine (Exelon) in the future to improve cognition.    Next MTM visit: call me to schedule    To schedule another MTM appointment, please call the clinic directly or you may call the MTM scheduling line at [phone] or toll-free at [phone].     My Clinical Pharmacist's contact information:                                                      It was a pleasure seeing you today!  Please feel free to contact me with any questions or concerns you have.      Zainab Sanches  Pharm.D.  Medication Therapy Management Resident  Phone: [phone]  Pager: [phone]    You may receive a survey about the [facility] services you received.  I would appreciate your feedback to help me serve you better in the future. Please fill it out and return it when you can. Your comments will be anonymous.            Follow-ups after your visit        Your next 10 appointments already scheduled     Jun 18, 2018 10:30 AM CDT   (Arrive by 10:15 AM)   New Movement Disorder with ED Balderrama CNP   [facility] ([facility])    [address] [place] [address] [phone]            Jun 28, 2018 12:15 PM CDT   (Arrive by 12:00 PM)   Return Visit with Alyson Mcgee MD   [facility] for Prostate and Urologic Cancers ([facility])    [address] [place] [address] [phone]              Who to contact     If you have questions or need follow up information about today's clinic visit or your schedule please contact [facility] directly at [phone].  Normal or non-critical lab and imaging results will be communicated to you by MyChart, letter or phone within 4 business days after the clinic has received the results. If you do not hear from us within 7 days, please contact the clinic through [employer] or phone. If you have a critical or abnormal lab result, we will notify you by phone as soon as possible.  Submit refill requests through Moonf[employer]e or call your pharmacy and they will forward the refill request to us. Please allow 3 business days for your refill to be completed.          Additional Information About Your Visit        Moonfrye Information     Moonfrye gives you secure access to your electronic health record. If you see a primary care provider, you can also send messages to your care team and make appointments. If you have questions, please call  your primary care clinic.  If you do not have a primary care provider, please call [phone] and they will assist you.        Care EveryWhere ID     This is your Care EveryWhere ID. This could be used by other organizations to access your [facility] medical records  [id number]         Blood Pressure from Last 3 Encounters:   03/22/18 138/76   03/15/18 140/77   02/08/18 (!) 150/91    Weight from Last 3 Encounters:   03/22/18 147 lb (66.7 kg)   03/15/18 130 lb 8 oz (59.2 kg)   02/08/18 131 lb 14.4 oz (59.8 kg)              Today, you had the following     No orders found for display       Primary Care Provider Office Phone # Fax #    [facility] [phone]       [facility] [address] [address]        Equal Access to Services     FERNANDA GARLAND : Hadii aad ku hadasho Soomaali, waaxda luqadaha, qaybta kaalmada adeegyada, waxay kimmyin haysusanan sanket mena . So [facility] [phone].    ATENCIÓN: Si habla español, tiene a eller disposición servicios gratuitos de asistencia lingüística. Jerilyn al [phone].    We comply with applicable federal civil rights laws and [place] laws. We do not discriminate on the basis of race, color, national origin, age, disability, sex, sexual orientation, or gender identity.            Thank you!     Thank you for choosing [facility]OLOG[facility]IC [facility]  for your care. Our goal is always to provide you with excellent care. Hearing back from our patients is one way we can continue to improve our services. Please take a few minutes to complete the written survey that you may receive in the mail after your visit with us. Thank you!             Your Updated Medication List - Protect others around you: Learn how to safely use, store and throw away your medicines at www.disposemymeds.org.          This list is accurate as of 3/21/18 11:59 PM.  Always use your most recent med list.                   Brand Name Dispense Instructions for use Diagnosis     acetaminophen 500 MG tablet    TYLENOL     Take 1 tablet (500 mg) by mouth every 4 hours as needed for mild pain        ASPIRIN LOW DOSE 81 MG tablet   Generic drug:  aspirin     30 tablet    81mg tablet @ 7am        carbidopa-levodopa  MG per tablet    SINEMET    720 tablet    2 tabs @ 7am, 11am, 3pm and 8pm    Parkinsonism, unspecified Parkinsonism type (H)       celeXA 40 MG tablet   Generic drug:  citalopram     90 tablet    40 mg tablet by mouth @ 10pm        lisinopril 2.5 MG tablet    PRINIVIL/Zestril    90 tablet    2.5mg tablet by mouth @ 10pm        polyethylene glycol Packet    MIRALAX/GLYCOLAX    7 packet    Take 17 g by mouth daily as needed for constipation        * QUEtiapine 100 MG tablet    SEROquel    60 tablet    100mg tablet @ 10pm; takes with a 25mg tablet        * QUEtiapine 25 MG tablet    SEROquel    60 tablet    1 tab @ 7am 1 tablet @ 10pm (along with 100mg at bedtime)        rOPINIRole 1 MG tablet    REQUIP    270 tablet    1mg tablet by mouth @ 7am, 11am and 3pm        simvastatin 20 MG tablet    ZOCOR    30 tablet    20mg tablet by mouth @ 10pm        SUMAtriptan 25 MG tablet    IMITREX    30 tablet    Take 1 tablet (25 mg) by mouth at onset of headache for migraine        SYNTHROID 50 MCG tablet   Generic drug:  levothyroxine     90 tablet    50mcg tablet by mouth @ 7am        vitamin D 2000 UNITS Caps     30 capsule    2000 units @ 7am        * Notice:  This list has 2 medication(s) that are the same as other medications prescribed for you. Read the directions carefully, and ask your doctor or other care provider to review them with you.

## 2018-03-21 NOTE — PROGRESS NOTES
"SUBJECTIVE/OBJECTIVE:                           Paige Mercado is a 72 year old female called for an initial visit for Medication Therapy Management.  She was referred to me from Dr. Styles.  Sister, Pamela, also on the phone.    Chief Complaint: Initial MTM visit    Allergies/ADRs: Reviewed in Epic  Tobacco: No tobacco use  Alcohol: Less than 2 beverage / month  Caffeine: 1-2 cups/day of coffee (very weak coffee)  Activity: Goes to the  (machines); [employer] for their PD classes 3x/week; walking outside -- not driving  PMH: Reviewed in Epic    Medication Adherence/Access:  Patient uses baggies and has assistance with medication administration (4 weeks at a time): family member. Patient has an alarm go off at each dosing time.  Patient takes medications 5 time(s) per day (7 am, 11 am, 3 pm, 8 pm, 10 pm).   Per patient, misses medication 0 times per week. The watch goes off twice for each dosing time.  Medication barriers: none.   The patient fills medications at [place]: NO, fills medications at [facility] [employer] mail order.    Parkinson's Disease:  Current medications include: Levodopa-carbidopa 100-25 mg 2 tablets 4 times daily (7 am, 11 am, 3 pm, 8 pm) and Ropinirole 1 mg 3 times daily (7 am, 11 am, 3 pm). Pt reports that symptoms of PD are improved. She is not bothered by tremor or other motor issues at this time and reports that her balance is better since going to the PD classes at [employer]. She does report that she is at times \"uncomfortable\" going into social situations but believes this is due to her underlying anxiety. She also reports some difficulty with word retrieval and misplacing things but is not interested in trying a medication at this time (ie: Exelon as discussed with Dr. Styles previously). Patient is currently not interested in doing PT, OT, or neuropsych testing - all recommended by Dr. Styles. She is willing to do speech therapy.    Insomnia/Hallucinations: Currently taking quetiapine 125 mg at bedtime " "and 25 mg in the morning. She reports that her psychiatrist increased the dose of quetiapine because she was having difficulty sleeping (from 100-125 mg); however, since increasing the dose of quetiapine she has noticed more drowsiness during the day and is wondering if the worsening confusion is also due to this change. She denies any recent hallucinations.    Depression/Anxiety: Current medications include: Citalopram 40 mg once daily. Patient states that her mood is quite good and she does not feel as though she has depression. She was previously on Xanax for anxiety but became more and more \"zombie-like\" and confused as the dose was increased over time. She is no longer taking a benzodiazepine but is still having anxiety and is interesting in other treatments if possible. Her next psychiatry appointment is on 4/4/18 at 2 pm with Dr. Mackay at [facility] ([phone]). She would like our clinic to communicate with Dr. Mackay about recommended medication changes.    Constipation: Currently taking Miralax one packet daily. She is also drinking more water and feels this works well.     Hypothyroidism: Patient is taking levothyroxine 50 mcg daily. Patient is having the following symptoms: none.   TSH   Date Value Ref Range Status   12/31/2017 2.62 0.40 - 4.00 mU/L Final     Hyperlipidemia: Current therapy includes simvastatin 20 mg once daily.  Pt reports no significant myalgias or other side effects.     Recurrent UTIs: Patient reports that she has been on antibiotics on and off in the past year for recurrent UTIs. She has GI upset with ciprofloxacin and sulfamethoxazole and another antibiotic was ineffective. She has found nitrofurantoin to be more effective with no side effects.     Migraines: No current medications, although sumatriptan is on her medication list. She reports that she hasn't had a migraine \"since menopause.\"      Current labs include:    BP Readings from Last 3 Encounters:   03/15/18 " 140/77   02/08/18 (!) 150/91   12/31/17 125/71     Last Basic Metabolic Panel:  Lab Results   Component Value Date     02/08/2018      Lab Results   Component Value Date    POTASSIUM 3.9 02/08/2018     Lab Results   Component Value Date    CHLORIDE 104 02/08/2018     Lab Results   Component Value Date    BUN 14 02/08/2018     Lab Results   Component Value Date    CR 0.61 02/08/2018     GFR Estimate   Date Value Ref Range Status   02/08/2018 >90 >60 mL/min/1.7m2 Final     Comment:     Non  GFR Calc   12/31/2017 74 >60 mL/min/1.7m2 Final     Comment:     Non  GFR Calc     GFR Estimate If Black   Date Value Ref Range Status   02/08/2018 >90 >60 mL/min/1.7m2 Final     Comment:      GFR Calc   12/31/2017 89 >60 mL/min/1.7m2 Final     Comment:      GFR Calc             Most Recent Immunizations   Administered Date(s) Administered     DTaP, Unspecified 04/25/2012     Flu, Unspecified 10/12/2016     Historical DTP/aP 06/13/1989     Influenza (High Dose) 3 valent vaccine 10/27/2017     Influenza (IIV3) PF 10/11/2013     Influenza Vaccine IM 3yrs+ 4 Valent IIV4 10/12/2013     Pneumo Conj 13-V (2010&after) 07/27/2015     Pneumococcal 23 valent 04/25/2012     Td (Adult), Adsorbed 03/19/2002     Tdap (Adult) Unspecified Formulation 03/19/2002       ASSESSMENT:                             Current medications were reviewed today.     Medication Adherence: excellent, no issues identified    Parkinson's Disease:  Needs improvement. Motor symptoms appear controlled, but she is having cognitive decline, which may be due to other medications she is on (potentially quetiapine) or disease progression. She may benefit from rivastigmine in the future to potentially improve cognition; however, will focus on management of psychosis and anxiety first as these are her priorities.    Insomnia/Hallucinations: Needs improvement. Patient appears to be experiencing side effects  from the high dose of quetiapine. She may benefit from switching to pimavanserin, which is indicated for PD-related psychosis and tends to be better tolerated. Would need to discuss with her psychiatrist.    Depression/Anxiety: Needs improvement. Patient may benefit from addition of buspirone to manage her anxiety since she is already on the maximum dose of citalopram. In fact, due to her advanced age, citalopram 20 mg would be preferred. Last QTc was 474, which is relatively high and should be monitored at least yearly while she continues to take citalopram 40 mg/day.    Constipation: Stable.    Hypothyroidism: Stable.    Hyperlipidemia: Stable. 10-year ASCVD Risk factor: 16.5% (calculated with most recent lipid values, not necessarily pre-treatment values so may underestimate risk). Pt is on moderate intensity statin which is indicated based on 2013 ACC/AHA guidelines for lipid management.     Recurrent UTIs: Stable.    Migraines: Stable.    PLAN:                            Recommendations for psychiatry (Dr. Mackay):   1. Consider addition of buspirone.  2. Consider transitioning patient from quetiapine to pimavanserin.  3/22/18: Left a detailed message message at Dr. Mackay's office regarding these two changes. Requested a call back to discuss. (see telephone encounter dated 3/27/18 for more information - Dr. Mackay did not agree with these recommendations)    Future recommendations:  1. Consider trial of rivastigmine after psychosis and anxiety are better controlled.  Addenedum 4/5/18: Because Dr. Mackay will not be making changes to the psych meds, patient would like to pursue a rivastigmine patch to manage cognitive issues. See telephone encounter dated 3/27/18 for more detail.    I spent 50 minutes with this patient today. I offer these suggestions for consideration by the psychiatrist and movement disorders specialist. A copy of the visit note was provided to the patient's psychiatry and movement disorders  specialist.    Will follow up in 1-2 months (family will call to schedule).     I concur with the note as dictated above which reflects our joint assessment and plan.   Kelsea Khalil PharmD    The patient was mailed a summary of these recommendations as an after visit summary.     Zainab Sanches, Pharm.D.  Medication Therapy Management Resident  Phone: [phone]  Pager: [phone]

## 2018-03-21 NOTE — Clinical Note
We discussed a number of issues, but I think priority #1 is adding Nuplazid and tapering down on quetiapine. She's up to 25 mg in the AM + 125 mg at HS and seems to be more sedated and confused. I left a message at her psychiatry clinic to discuss with them. I'll keep you updated.

## 2018-03-22 ENCOUNTER — OFFICE VISIT (OUTPATIENT)
Dept: UROLOGY | Facility: CLINIC | Age: 73
End: 2018-03-22
Payer: COMMERCIAL

## 2018-03-22 VITALS
HEIGHT: 63 IN | BODY MASS INDEX: 26.05 KG/M2 | HEART RATE: 92 BPM | WEIGHT: 147 LBS | DIASTOLIC BLOOD PRESSURE: 76 MMHG | SYSTOLIC BLOOD PRESSURE: 138 MMHG

## 2018-03-22 DIAGNOSIS — N39.0 RECURRENT URINARY TRACT INFECTION: Primary | ICD-10-CM

## 2018-03-22 LAB
APPEARANCE UR: CLEAR
BILIRUB UR QL: NORMAL
COLOR UR: YELLOW
GLUCOSE URINE: NORMAL MG/DL
HGB UR QL: NORMAL
KETONES UR QL: NORMAL MG/DL
LEUKOCYTE ESTERASE URINE: NORMAL
NITRITE UR QL STRIP: NORMAL
PH UR STRIP: 7 PH (ref 5–7)
PROTEIN ALBUMIN URINE: NORMAL MG/DL
SOURCE: NORMAL
SP GR UR STRIP: 1.01 (ref 1–1.03)
UROBILINOGEN UR QL STRIP: 0.2 EU/DL (ref 0.2–1)

## 2018-03-22 ASSESSMENT — PAIN SCALES - GENERAL
PAINLEVEL: NO PAIN (0)
PAINLEVEL: NO PAIN (0)

## 2018-03-22 NOTE — LETTER
"3/22/2018       RE: Paige Mercado  [address] [address]     Dear Colleague,    Thank you for referring your patient, Paige Mercado, to the [facility] UROLOGY AND INST FOR PROSTATE AND UROLOGIC CANCERS at [facility]. Please see a copy of my visit note below.    March 22, 2018    Return visit    Patient returns today for follow up.  She denies any changes in her health since last visit.    /76  Pulse 92  Ht 1.6 m (5' 3\")  Wt 66.7 kg (147 lb)  BMI 26.04 kg/m2  She is comfortable, in no distress, non-labored breathing.  Abdomen is soft, non-tender, non-distended.  Normal external female genitalia.  Negative CST.  Pelvic exam is unremarkable.    Cystoscopy Note: After informed consent was obtained patient was prepped and draped in the standard fashion.  The flexible cystoscope was inserted into a normal appearing urethral meatus.  The urothelium was carefully examined and there were no tumors, masses, stones, foreign bodies, or other urothelial abnormalities noted.  Bilateral ureteral orifices were noted in the normal orthotopic position and both effluxed clear urine.  The cystoscope was retroflexed and the bladder neck was unremarkable.  The urethra was carefully examined upon removing the cystoscope and was unremarkable.  Patient tolerated the procedure without complications noted.      CT scan remarkable for a small stone in her kidney otherwise no obvious nidus for UTI.    A/P: 72 year old F with Parkinson's disease, history of cognitive decline, history of UTI    At this time we discussed the need to monitor for infections.  She has been infection free for about 3 months now.    Discussed that should there be concern she should contact our clinic or the urology resident on call and come in for a culture prior to starting empiric antibiotics.      RTC 3 months, sooner if needed    Alyson Mcgee MD MPH   of Urology    CC  Patient " Care Team:  Clinic, [facility] as PCP - General  Alyson Mcgee MD as MD (Urology)  Trixie Aldridge, RN as Registered Nurse (Urology)  Paige Jensen RN as Nurse Coordinator (Neurology)  [facility], [place]

## 2018-03-22 NOTE — PATIENT INSTRUCTIONS
"If you think you have an infection please first push fluids.  If this is not better within 24 hours at the latest then you need to come in for a urine culture     The numbers to call if there are questions or concerns  [phone] during business hours  [phone] and ask for the UROLOGY resident on call on nights and weekends    AFTER YOUR CYSTOSCOPY        You have just completed a cystoscopy, or \"cysto\", which allowed your physician to learn more about your bladder (or to remove a stent placed after surgery). We suggest that you continue to avoid caffeine, fruit juice, and alcohol for the next 24 hours, however, you are encouraged to return to your normal activities.         A few things that are considered normal after your cystoscopy:     * Small amount of bleeding (or spotting) that clears within the next 24 hours     * Slight burning sensation with urination     * Sensation to of needing to avoid more frequently     * The feeling of \"air\" in your urine     * Mild discomfort that is relieved with Tylenol        Please contact our office promptly if you:     * Develop a fever above 101 degrees     * Are unable to urinate     * Develop bright red blood that does not stop     * Severe pain or swelling      Please contact our office with any concerns or questions @[phone]  "

## 2018-03-22 NOTE — MR AVS SNAPSHOT
"              After Visit Summary   3/22/2018    Paige Mercado    MRN: [id number]           Patient Information     Date Of Birth          1945        Visit Information        Provider Department      3/22/2018 12:15 PM Alyson Mcgee MD [facility] Urology and [facility] for Prostate and Urologic Cancers        Today's Diagnoses     Recurrent urinary tract infection    -  1      Care Instructions    If you think you have an infection please first push fluids.  If this is not better within 24 hours at the latest then you need to come in for a urine culture     The numbers to call if there are questions or concerns  [phone] during business hours  [phone] and ask for the UROLOGY resident on call on nights and weekends    AFTER YOUR CYSTOSCOPY        You have just completed a cystoscopy, or \"cysto\", which allowed your physician to learn more about your bladder (or to remove a stent placed after surgery). We suggest that you continue to avoid caffeine, fruit juice, and alcohol for the next 24 hours, however, you are encouraged to return to your normal activities.         A few things that are considered normal after your cystoscopy:     * Small amount of bleeding (or spotting) that clears within the next 24 hours     * Slight burning sensation with urination     * Sensation to of needing to avoid more frequently     * The feeling of \"air\" in your urine     * Mild discomfort that is relieved with Tylenol        Please contact our office promptly if you:     * Develop a fever above 101 degrees     * Are unable to urinate     * Develop bright red blood that does not stop     * Severe pain or swelling      Please contact our office with any concerns or questions @[phone]          Follow-ups after your visit        Follow-up notes from your care team     Return in 3 months (on 6/22/2018).      Your next 10 appointments already scheduled     Jun 18, 2018 10:30 AM CDT   (Arrive by 10:15 AM)   New Movement " "Disorder with ED Balderrama CNP   [facility] Neurology ([place])    [address] [facility] [address] [phone]            Jun 28, 2018 12:15 PM CDT   (Arrive by 12:00 PM)   Return Visit with Alyson Mcgee MD   [facility] Urology and [facility] for Prostate and Urologic Cancers ([place])    [address]  [address] [facility] [address] [phone]              Who to contact     Please call your clinic at [phone] to:    Ask questions about your health    Make or cancel appointments    Discuss your medicines    Learn about your test results    Speak to your doctor            Additional Information About Your Visit        [employer] Information     [employer] gives you secure access to your electronic health record. If you see a primary care provider, you can also send messages to your care team and make appointments. If you have questions, please call your primary care clinic.  If you do not have a primary care provider, please call [phone] and they will assist you.      [employer] is an electronic gateway that provides easy, online access to your medical records. With [employer], you can request a clinic appointment, read your test results, renew a prescription or communicate with your care team.     To access your existing account, please contact your [facility] or call [phone] for assistance.        Care EveryWhere ID     This is your Care EveryWhere ID. This could be used by other organizations to access your [facility] medical records  [id number]        Your Vitals Were     Pulse Height BMI (Body Mass Index)             92 1.6 m (5' 3\") 26.04 kg/m2          Blood Pressure from Last 3 Encounters:   03/22/18 138/76   03/15/18 140/77   02/08/18 (!) 150/91    Weight from Last 3 Encounters:   03/22/18 66.7 kg (147 lb)   03/15/18 59.2 kg (130 lb 8 oz)   02/08/18 59.8 kg (131 " lb 14.4 oz)              We Performed the Following     CYSTOURETHROSCOPY     Cytology non gyn     Urinalysis chemical screen POCT        Primary Care Provider Office Phone # Fax #    [facility] [phone] [address] [address]        Equal Access to Services     FERNANDA GARLAND : Hadii aad ku hadasho Soomaali, waaxda luqadaha, qaybta kaalmada adeegyada, waxay kimmyin hayaan adeeg luci labrayan lange. So [facility] [phone].    ATENCIÓN: Si habla español, tiene a eller disposición servicios gratuitos de asistencia lingüística. [facility] [phone].    We comply with applicable federal civil rights laws and [place] laws. We do not discriminate on the basis of race, color, national origin, age, disability, sex, sexual orientation, or gender identity.            Thank you!     Thank you for choosing [facility] UROLOGY AND [facility] FOR PROSTATE AND UROLOGIC CANCERS  for your care. Our goal is always to provide you with excellent care. Hearing back from our patients is one way we can continue to improve our services. Please take a few minutes to complete the written survey that you may receive in the mail after your visit with us. Thank you!             Your Updated Medication List - Protect others around you: Learn how to safely use, store and throw away your medicines at www.disposemymeds.org.          This list is accurate as of 3/22/18 12:57 PM.  Always use your most recent med list.                   Brand Name Dispense Instructions for use Diagnosis    acetaminophen 500 MG tablet    TYLENOL     Take 1 tablet (500 mg) by mouth every 4 hours as needed for mild pain        ASPIRIN LOW DOSE 81 MG tablet   Generic drug:  aspirin     30 tablet    81mg tablet @ 7am        carbidopa-levodopa  MG per tablet    SINEMET    720 tablet    2 tabs @ 7am, 11am, 3pm and 8pm    Parkinsonism, unspecified Parkinsonism type (H)       celeXA 40 MG tablet   Generic drug:  citalopram     90  tablet    40 mg tablet by mouth @ 10pm        lisinopril 2.5 MG tablet    PRINIVIL/Zestril    90 tablet    2.5mg tablet by mouth @ 10pm        polyethylene glycol Packet    MIRALAX/GLYCOLAX    7 packet    Take 17 g by mouth daily as needed for constipation        * QUEtiapine 100 MG tablet    SEROquel    60 tablet    100mg tablet @ 10pm; takes with a 25mg tablet        * QUEtiapine 25 MG tablet    SEROquel    60 tablet    1 tab @ 7am 1 tablet @ 10pm (along with 100mg at bedtime)        rOPINIRole 1 MG tablet    REQUIP    270 tablet    1mg tablet by mouth @ 7am, 11am and 3pm        simvastatin 20 MG tablet    ZOCOR    30 tablet    20mg tablet by mouth @ 10pm        SUMAtriptan 25 MG tablet    IMITREX    30 tablet    Take 1 tablet (25 mg) by mouth at onset of headache for migraine        SYNTHROID 50 MCG tablet   Generic drug:  levothyroxine     90 tablet    50mcg tablet by mouth @ 7am        vitamin D 2000 UNITS Caps     30 capsule    2000 units @ 7am        * Notice:  This list has 2 medication(s) that are the same as other medications prescribed for you. Read the directions carefully, and ask your doctor or other care provider to review them with you.

## 2018-03-22 NOTE — PROGRESS NOTES
"March 22, 2018    Return visit    Patient returns today for follow up.  She denies any changes in her health since last visit.    /76  Pulse 92  Ht 1.6 m (5' 3\")  Wt 66.7 kg (147 lb)  BMI 26.04 kg/m2  She is comfortable, in no distress, non-labored breathing.  Abdomen is soft, non-tender, non-distended.  Normal external female genitalia.  Negative CST.  Pelvic exam is unremarkable.    Cystoscopy Note: After informed consent was obtained patient was prepped and draped in the standard fashion.  The flexible cystoscope was inserted into a normal appearing urethral meatus.  The urothelium was carefully examined and there were no tumors, masses, stones, foreign bodies, or other urothelial abnormalities noted.  Bilateral ureteral orifices were noted in the normal orthotopic position and both effluxed clear urine.  The cystoscope was retroflexed and the bladder neck was unremarkable.  The urethra was carefully examined upon removing the cystoscope and was unremarkable.  Patient tolerated the procedure without complications noted.      CT scan remarkable for a small stone in her kidney otherwise no obvious nidus for UTI.    A/P: 72 year old F with Parkinson's disease, history of cognitive decline, history of UTI    At this time we discussed the need to monitor for infections.  She has been infection free for about 3 months now.    Discussed that should there be concern she should contact our clinic or the urology resident on call and come in for a culture prior to starting empiric antibiotics.      RTC 3 months, sooner if needed    Alyson Mcgee MD MPH   of Urology    CC  Patient Care Team:  [facility], [facility] as PCP - General  Alyson Mcgee MD as MD (Urology)  Trixie Aldridge, RN as Registered Nurse (Urology)  Paige Jensen RN as Nurse Coordinator (Neurology)  [facility], [place]                "

## 2018-03-22 NOTE — PATIENT INSTRUCTIONS
Recommendations from today's MTM visit:                                                    MTM (medication therapy management) is a service provided by a clinical pharmacist designed to help you get the most of out of your medicines.      1. We discussed switching from quetiapine to pimavanserin (Nuplazid) to manage hallucinations because it is approved for Parkinson's Disease and may have less side effects. This should also be discussed with your psychiatrist.    2. We also discussed adding buspirone (Buspar) to help manage your anxiety. Both of these recommendations for Nuplazid and buspirone were given to Dr. Mackay and I am waiting to hear back from her.    3. We can consider trying rivastigimine (Exelon) in the future to improve cognition.    Next MTM visit: call me to schedule    To schedule another MTM appointment, please call the clinic directly or you may call the MTM scheduling line at [phone] or toll-free at [phone].     My Clinical Pharmacist's contact information:                                                      It was a pleasure seeing you today!  Please feel free to contact me with any questions or concerns you have.      Zainab Sanches, Pharm.D.  Medication Therapy Management Resident  Phone: [phone]  Pager: [phone]    You may receive a survey about the MTM services you received.  I would appreciate your feedback to help me serve you better in the future. Please fill it out and return it when you can. Your comments will be anonymous.

## 2018-03-26 LAB — COPATH REPORT: NORMAL

## 2018-03-27 ENCOUNTER — TELEPHONE (OUTPATIENT)
Dept: PHARMACY | Facility: CLINIC | Age: 73
End: 2018-03-27

## 2018-03-27 DIAGNOSIS — G20.A1 PARKINSON DISEASE (H): Primary | ICD-10-CM

## 2018-03-27 NOTE — TELEPHONE ENCOUNTER
Dr. Mackay returned call to [facility] pharmacist to discuss medication recommendations. Dr. Mackay described that quetiapine was initially started to treat delirium in this patient during a UTI. Then, they found that quetiapine was helpful to manage her anxiety, so they kept her on quetiapine. Because the patient hasn't reported hallucinations recently, Dr. Mackay would feel uncomfortable switching to Nuplazid. She would like to discuss this more in depth. In addition, she didn't think that Buspar would be necessary as an add-on therapy at this time because quetiapine is working so well.     Dr. Mackay is currently on spring break and will be back in clinic on 4/3/18. She says we can return her call to: [phone] at that time.    Zainab Sanches, Pharm.D.  Medication Therapy Management Resident  Phone: [phone]  Pager: [phone]

## 2018-04-03 NOTE — TELEPHONE ENCOUNTER
"Spoke with daughter, Rosalva, regarding the medication changes that we discussed at our MT visit on 3/21/18. Rosalva reports that Paige is currently not having hallucinations and that the only episode of hallucinations that she did have were related to a UTI. She did agree with Dr. Mackay that when quetiapine was added during the UTI, they found that her anxiety improved and her sleep improved, so they haven't stopped it. They've adjusted the dose quite a bit because she was feeling \"disoriented\" in the mornings, but when they went up to 50 mg in the morning, she was more sleepy during the day, so they went down to 25 mg in the morning and kept 100 mg at bedtime.     Rosalva is most concerned with her confusion rather than the anxiety. She describes that the confusion occurs in various situations; however, in social situations she can \"fake\" her way through and even sounds \"normal\" on the phone. She states that she has word recall issues and sometimes the words she says don't make sense. On Faith over the weekend, patient didn't know how to get back to her seat after going up for communion. Rosalva also described that the patient is more \"obsessive\" about her medications even though there haven't been significant changes to the medications and the family helps label everything for her.    Overall, they would like to address the cognition issues first by trying the rivastigmine patch and not adjust her psych meds (quetiapine and citalopram) because they feel as though she is less anxious.     Rosalva mentions that patient is having cataract surgery in May and typically the anesthetic makes her confused, so wanted to make sure that adding rivastigmine would not necessarily \"counteract\" the anesthetic.     Zainab Sanches, Pharm.D.  Medication Therapy Management Resident  Phone: [phone]  Pager: [phone]  "

## 2018-04-03 NOTE — TELEPHONE ENCOUNTER
Left voicemail message for Dr. Mackay explaining the potential plan to not change her psych meds and instead try rivastigmine. Would like to hear back from Dr. Mackay and/or wait until after her appointment with the patient tomorrow before adding rivastigmine to ensure we are only making one change at a time.    Zainab Sanches, Pharm.D.  Medication Therapy Management Resident  Phone: [phone]  Pager: [phone]

## 2018-04-03 NOTE — TELEPHONE ENCOUNTER
"Discussed with Dr. Mackay over the phone. She states that she started quetiapine for this patient because of her severe delirium and anxiety. She has found that quetiapine is most effective in these cases verus Nuplazid when hallucinations are not present. Dr. Mackay explained that the patient was also severely anxious in the past and that she has \"cleared significantly\" since she was started on quetiapine. She would prefer to keep her on quetiapine at this time and not make any other adjustments to her psych meds. She is in favor of us adding rivastigmine as long as neurology manages this medication.    Patient is being seen by Dr. Mackay tomorrow, so will not initiate rivastigmine until that appointment is completed to ensure that no more than one medication changes are made at one time.    Zainab Sanches, Pharm.D.  Medication Therapy Management Resident  Phone: [phone]  Pager: [phone]  "

## 2018-04-03 NOTE — TELEPHONE ENCOUNTER
Paige's daughter (Rosalva Bledsoe) left a message and would like to speak with me regarding the recommendations I made. They have an appointment with Dr. Mackay this week.     Call Rosalva back at: [phone].    Zainab Sanches, Pharm.D.  Medication Therapy Management Resident  Phone: [phone]  Pager: [phone]

## 2018-04-04 RX ORDER — RIVASTIGMINE 4.6 MG/24H
1 PATCH, EXTENDED RELEASE TRANSDERMAL DAILY
Qty: 30 PATCH | Refills: 0 | Status: SHIPPED | OUTPATIENT
Start: 2018-04-04 | End: 2018-04-30 | Stop reason: DRUGHIGH

## 2018-04-04 NOTE — TELEPHONE ENCOUNTER
Daughter, Rosalva, called after appointment with Dr. Mackay. There were no medication changes at that appointment, so they would like to go forward with the addition of the Exelon patch. Call Rosalva at [phone] to discuss.    Rx for Exelon 4.6 mg patch e-prescribed to [facility] pharmacy per Dr. Styles.    Zainab Sanches, Pharm.D.  Medication Therapy Management Resident  Phone: [phone]  Pager: [phone]

## 2018-04-04 NOTE — TELEPHONE ENCOUNTER
Message  Received: Yesterday       Keith Styles MD [place], Zainab De La Cruz, [facility]       Caller: Unspecified (1 week ago)                     Sounds good to try the rivastigmine patch       Patient called to discuss rivastigmine. She had questions about the patch versus the pills. She will be meeting with Dr. Mackay later this afternoon and will call after the appointment to discuss a plan for starting rivastigmine if Dr. Mackay does not make any medication changes.     Zainab Sanches, Pharm.D.  Medication Therapy Management Resident  Phone: [phone]  Pager: [phone]

## 2018-04-05 NOTE — TELEPHONE ENCOUNTER
Applied for [employer] and provided this information to the [facility] pharmacy.     Zainab Sancehs, Pharm.D.  Medication Therapy Management Resident  Phone: [phone]  Pager: [phone]

## 2018-04-09 ENCOUNTER — TELEPHONE (OUTPATIENT)
Dept: PHARMACY | Facility: CLINIC | Age: 73
End: 2018-04-09

## 2018-04-09 NOTE — TELEPHONE ENCOUNTER
Patient paged [facility] pharmacist with questions about the rivastigmine patch. She would like a call back at [phone].    Zainab Sanches, Pharm.D.  Medication Therapy Management Resident  Phone: [phone]  Pager: [phone]

## 2018-04-09 NOTE — TELEPHONE ENCOUNTER
Spoke with patient and she was wondering about which sites she can apply the patch and how to rotate sites every 14 days as the information she was given states. We discussed that she can apply the patch to her upper arm, lower back, or upper back. She felt comfortable with these locations and will rotate sites on a daily basis.     Zainab Sanches, Pharm.D.  Medication Therapy Management Resident  Phone: [phone]  Pager: [phone]

## 2018-04-10 ENCOUNTER — HOSPITAL ENCOUNTER (OUTPATIENT)
Dept: PHYSICAL THERAPY | Age: 73
Setting detail: THERAPIES SERIES
Discharge: STILL ADMITTED OR ADMINISTRATIVE | End: 2018-04-10
Attending: PHYSICAL THERAPIST

## 2018-04-10 DIAGNOSIS — R26.81 GAIT INSTABILITY: ICD-10-CM

## 2018-04-10 DIAGNOSIS — R29.3 POSTURAL INSTABILITY: ICD-10-CM

## 2018-04-12 ENCOUNTER — HOSPITAL ENCOUNTER (OUTPATIENT)
Dept: PHYSICAL THERAPY | Age: 73
Setting detail: THERAPIES SERIES
Discharge: STILL ADMITTED OR ADMINISTRATIVE | End: 2018-04-12
Attending: PHYSICAL THERAPIST

## 2018-04-12 DIAGNOSIS — R26.81 GAIT INSTABILITY: ICD-10-CM

## 2018-04-12 DIAGNOSIS — R29.3 POSTURAL INSTABILITY: ICD-10-CM

## 2018-04-17 ENCOUNTER — HOSPITAL ENCOUNTER (OUTPATIENT)
Dept: PHYSICAL THERAPY | Age: 73
Setting detail: THERAPIES SERIES
Discharge: STILL ADMITTED OR ADMINISTRATIVE | End: 2018-04-17
Attending: PHYSICAL THERAPIST

## 2018-04-17 DIAGNOSIS — R26.81 GAIT INSTABILITY: ICD-10-CM

## 2018-04-17 DIAGNOSIS — R29.3 POSTURAL INSTABILITY: ICD-10-CM

## 2018-04-19 ENCOUNTER — HOSPITAL ENCOUNTER (OUTPATIENT)
Dept: PHYSICAL THERAPY | Age: 73
Setting detail: THERAPIES SERIES
Discharge: STILL ADMITTED OR ADMINISTRATIVE | End: 2018-04-19
Attending: PHYSICAL THERAPIST

## 2018-04-19 DIAGNOSIS — R29.3 POSTURAL INSTABILITY: ICD-10-CM

## 2018-04-19 DIAGNOSIS — R26.81 GAIT INSTABILITY: ICD-10-CM

## 2018-04-30 ENCOUNTER — TELEPHONE (OUTPATIENT)
Dept: PHARMACY | Facility: CLINIC | Age: 73
End: 2018-04-30

## 2018-04-30 DIAGNOSIS — G20.A1 PARKINSON DISEASE (H): Primary | ICD-10-CM

## 2018-04-30 RX ORDER — RIVASTIGMINE 9.5 MG/24H
1 PATCH, EXTENDED RELEASE TRANSDERMAL DAILY
Qty: 30 PATCH | Refills: 11 | Status: SHIPPED | OUTPATIENT
Start: 2018-04-30 | End: 2018-08-06 | Stop reason: DRUGHIGH

## 2018-04-30 NOTE — TELEPHONE ENCOUNTER
"Patient called requesting a refill of the Exelon patch. She denies nausea or other side effects. She hasn't noticed significant changes in cognition, but states that today she is \"talking fluidly.\"     As discussed previously, we will increase her Exelon dose to 9.5 mg/day since she has tolerated 4.6 mg. Routing order to Dr. Styles to sign.    Zainab Sanches, Pharm.D.  Medication Therapy Management Resident  Phone: [phone]  Pager: [phone]  "

## 2018-05-29 ENCOUNTER — ALLIED HEALTH/NURSE VISIT (OUTPATIENT)
Dept: PHARMACY | Facility: CLINIC | Age: 73
End: 2018-05-29
Payer: COMMERCIAL

## 2018-05-29 ENCOUNTER — TELEPHONE (OUTPATIENT)
Dept: PHARMACY | Facility: CLINIC | Age: 73
End: 2018-05-29

## 2018-05-29 DIAGNOSIS — E55.9 VITAMIN D DEFICIENCY: ICD-10-CM

## 2018-05-29 DIAGNOSIS — Z79.82 ASPIRIN LONG-TERM USE: ICD-10-CM

## 2018-05-29 DIAGNOSIS — G20.A1 PARKINSON DISEASE (H): Primary | ICD-10-CM

## 2018-05-29 PROCEDURE — 99606 MTMS BY PHARM EST 15 MIN: CPT | Performed by: PHARMACIST

## 2018-05-29 NOTE — MR AVS SNAPSHOT
After Visit Summary   5/29/2018    Paige Mercado    MRN: [id number]           Patient Information     Date Of Birth          1945        Visit Information        Provider Department      5/29/2018 2:00 PM Zainab Sanches [facility] MTM        Today's Diagnoses     Parkinson disease (H)    -  1    Aspirin long-term use        Vitamin D deficiency          Care Instructions    Recommendations from today's MTM visit:                                                      1. Consider establishing primary care at the [facility].     2. Consider asking your new primary care provider to get a Vitamin D blood level drawn.     3. Consider stopping your aspirin.     Next MTM visit: in one month by phone    To schedule another MTM appointment, please call the clinic directly or you may call the MTM scheduling line at [phone] or toll-free at [phone].     My Clinical Pharmacist's contact information:                                                      It was a pleasure seeing you today!  Please feel free to contact me with any questions or concerns you have.      Zainab Sanches, Pharm.D.  Medication Therapy Management Resident  Phone: [phone]  Pager: [phone]    You may receive a survey about the MTM services you received.  I would appreciate your feedback to help me serve you better in the future. Please fill it out and return it when you can. Your comments will be anonymous.            Follow-ups after your visit        Your next 10 appointments already scheduled     Jun 18, 2018 10:30 AM CDT   (Arrive by 10:15 AM)   New Movement Disorder with ED [facility] Neurology ([facility])    [address] [place] [id number] [phone]            Jun 28, 2018 12:15 PM CDT   (Arrive by 12:00 PM)   Return Visit with Alyson Mcgee MD   [facility]rology and Inst for Prostate and  Urologic Cancers ([facility])    [address]  4th [address]  [facility] [address] [phone]              Who to contact     If you have questions or need follow up information about today's clinic visit or your schedule please contact [facility] directly at [phone].  Normal or non-critical lab and imaging results will be communicated to you by MyChart, letter or phone within 4 business days after the clinic has received the results. If you do not hear from us within 7 days, please contact the clinic through [employer] or phone. If you have a critical or abnormal lab result, we will notify you by phone as soon as possible.  Submit refill requests through [employer] or call your pharmacy and they will forward the refill request to us. Please allow 3 business days for your refill to be completed.          Additional Information About Your Visit        [employer] Information     [employer] gives you secure access to your electronic health record. If you see a primary care provider, you can also send messages to your care team and make appointments. If you have questions, please call your primary care clinic.  If you do not have a primary care provider, please call [phone] and they will assist you.        Care EveryWhere ID     This is your Care EveryWhere ID. This could be used by other organizations to access your [place] medical records  [id number]         Blood Pressure from Last 3 Encounters:   03/22/18 138/76   03/15/18 140/77   02/08/18 (!) 150/91    Weight from Last 3 Encounters:   03/22/18 147 lb (66.7 kg)   03/15/18 130 lb 8 oz (59.2 kg)   02/08/18 131 lb 14.4 oz (59.8 kg)              Today, you had the following     No orders found for display       Primary Care Provider Office Phone # Fax #    Geetha Wanget [facility] [phone]       [address] [place] [address]        Equal Access to Services     FERNANDA EVANS: Jenna  everett Guajardo, wadelilahda luqadaha, qaybta kaalmada brenda, valerie kimmyin hayaamarcos gomezteresa verma larolandomarcos nazario. So [facility] [phone].    ATENCIÓN: Si hueyla alayna, tiene a eller disposición servicios gratuitos de asistencia lingüística. Jerilyn al [phone].    We comply with applicable federal civil rights laws and [place] laws. We do not discriminate on the basis of race, color, national origin, age, disability, sex, sexual orientation, or gender identity.            Thank you!     Thank you for choosing [facility]UROLOG[facility]  for your care. Our goal is always to provide you with excellent care. Hearing back from our patients is one way we can continue to improve our services. Please take a few minutes to complete the written survey that you may receive in the mail after your visit with us. Thank you!             Your Updated Medication List - Protect others around you: Learn how to safely use, store and throw away your medicines at www.disposemymeds.org.          This list is accurate as of 5/29/18 11:59 PM.  Always use your most recent med list.                   Brand Name Dispense Instructions for use Diagnosis    acetaminophen 500 MG tablet    TYLENOL     Take 1 tablet (500 mg) by mouth every 4 hours as needed for mild pain        ASPIRIN LOW DOSE 81 MG tablet   Generic drug:  aspirin     30 tablet    81mg tablet @ 7am        carbidopa-levodopa  MG per tablet    SINEMET    720 tablet    2 tabs @ 7am, 11am, 3pm and 8pm    Parkinsonism, unspecified Parkinsonism type (H)       celeXA 40 MG tablet   Generic drug:  citalopram     90 tablet    40 mg tablet by mouth @ 10pm        lisinopril 2.5 MG tablet    PRINIVIL/Zestril    90 tablet    2.5mg tablet by mouth @ 10pm        polyethylene glycol Packet    MIRALAX/GLYCOLAX    7 packet    Take 17 g by mouth daily as needed for constipation        * QUEtiapine 100 MG tablet    SEROquel    60 tablet    100mg tablet @ 10pm; takes with a 25mg tablet        *  QUEtiapine 25 MG tablet    SEROquel    60 tablet    1 tab @ 7am 1 tablet @ 10pm (along with 100mg at bedtime)        rivastigmine 9.5 MG/24HR 24 hr patch    EXELON    30 patch    Place 1 patch onto the skin daily    Parkinson disease (H)       rOPINIRole 1 MG tablet    REQUIP    270 tablet    1mg tablet by mouth @ 7am, 11am and 3pm        simvastatin 20 MG tablet    ZOCOR    30 tablet    20mg tablet by mouth @ 10pm        SUMAtriptan 25 MG tablet    IMITREX    30 tablet    Take 1 tablet (25 mg) by mouth at onset of headache for migraine        SYNTHROID 50 MCG tablet   Generic drug:  levothyroxine     90 tablet    50mcg tablet by mouth @ 7am        vitamin D 2000 units Caps     30 capsule    2000 units @ 7am        * Notice:  This list has 2 medication(s) that are the same as other medications prescribed for you. Read the directions carefully, and ask your doctor or other care provider to review them with you.

## 2018-05-29 NOTE — Clinical Note
Patient seems to be doing well on the Exelon patch. We'll keep her on 9.5 mg dose until she sees Alyssa in June. Could consider going up on the dose at that point if needed.

## 2018-05-29 NOTE — PROGRESS NOTES
SUBJECTIVE/OBJECTIVE:                Paige Mercado is a 72 year old female called for a follow-up visit for Medication Therapy Management.  She was referred to me from Dr. Styles.     Chief Complaint: Follow up from our visit on 3/21/18  Tobacco: No tobacco use  Alcohol: Less than 2 beverage / month    Medication Adherence/Access: patient paid $80 for her last box of Exelon patches but did not use the PD medication garth funding that she has available to her    Parkinson's Disease:  Patient continues to take carbidopa-levodopa  2 tablets four times daily, ropinirole 1 mg 3 times daily, and rivastigmine patch 9.5 mg daily. Patient reports that motor symptoms of PD are unchanged. She has not noticed a significant change in cognition since increasing from 4.6 mg to 9.5 mg, but has only been on the higher dose for about 2 weeks. She did notice an improvement after a month on 4.6 mg.     Today, patient asks if she could stop taking aspirin 81 mg daily (taking for heart prevention) and vitamin D 2000 units daily (taking because she thinks she has low Vitamin D). She also asks about establishing care with a new PCP and would like a recommendation in [place].    Current labs include:  BP Readings from Last 3 Encounters:   03/22/18 138/76   03/15/18 140/77   02/08/18 (!) 150/91     Last Basic Metabolic Panel:  Lab Results   Component Value Date     02/08/2018      Lab Results   Component Value Date    POTASSIUM 3.9 02/08/2018     Lab Results   Component Value Date    CHLORIDE 104 02/08/2018     Lab Results   Component Value Date    BUN 14 02/08/2018     Lab Results   Component Value Date    CR 0.61 02/08/2018     GFR Estimate   Date Value Ref Range Status   02/08/2018 >90 >60 mL/min/1.7m2 Final     Comment:     Non  GFR Calc   12/31/2017 74 >60 mL/min/1.7m2 Final     Comment:     Non  GFR Calc     GFR Estimate If Black   Date Value Ref Range Status   02/08/2018 >90 >60 mL/min/1.7m2  Final     Comment:      GFR Calc   12/31/2017 89 >60 mL/min/1.7m2 Final     Comment:      GFR Calc     ASSESSMENT:              Current medications were reviewed today as discussed above.      Medication Adherence: good, needs improvement - see below    Parkinson's Disease: Stable. Patient would benefit from staying on current doses of her medications for another 2-4 weeks before assessing whether rivastigmine dose could/should be increased. We also discussed at length how she can call in refills to her pharmacy and let them know to use the PD medication garth funding after billing insurance. Because patient is not willing to do neuropscyh testing, will have to use her subjective assessment of improvement in cognition/memory and potentially her daughter's perception as well.     ASCVD prevention: Unable to fully assess the risks/benefits without a full understanding of her history, but with a 10-year ASCVD risk of 16.5% and taking into account her advanced age, the risk may outweigh the benefits since she does not seem to have a history of cardiovascular events.    Vitamin D deficiency: Needs improvement. Patient may benefit from current Vitamin D level to assess efficacy of Vitamin D supplementation.     PLAN:                  1. Suggested that patient look into establishing primary care at the [facility].   2. Patient to consider asking her new PCP for a Vitamin D level to be drawn.   3. Patient can consider discontinuing aspirin.     I spent 15 minutes with this patient today. I offer these suggestions for consideration by the movement disorders specialist. A copy of the visit note was provided to the movement disorders specialist.     Will follow up in one month by phone.    I concur with the note as dictated above which reflects our joint assessment and plan.   Kelsea Khalil, PharmD    The patient was mailed a summary of these recommendations as an after visit  summary.    Zainab Sanches Pharm.D.  Medication Therapy Management Resident  Phone: [phone]  Pager: [phone]

## 2018-05-30 NOTE — PATIENT INSTRUCTIONS
Recommendations from today's MTM visit:                                                      1. Consider establishing primary care at the [facility].     2. Consider asking your new primary care provider to get a Vitamin D blood level drawn.     3. Consider stopping your aspirin.     Next MTM visit: in one month by phone    To schedule another MTM appointment, please call the clinic directly or you may call the MTM scheduling line at [phone] or toll-free at [phone].     My Clinical Pharmacist's contact information:                                                      It was a pleasure seeing you today!  Please feel free to contact me with any questions or concerns you have.      Zainab Sanches, Pharm.D.  Medication Therapy Management Resident  Phone: [phone]  Pager: [phone]    You may receive a survey about the MTM services you received.  I would appreciate your feedback to help me serve you better in the future. Please fill it out and return it when you can. Your comments will be anonymous.

## 2018-06-14 NOTE — TELEPHONE ENCOUNTER
FUTURE VISIT INFORMATION      FUTURE VISIT INFORMATION:    Date: 06/18/2018    Time:     Location:   REFERRAL INFORMATION:    Referring provider:  DEVONTE SMITH    Referring providers clinic:     Reason for visit/diagnosis          RECORDS STATUS      Internal Records: [facility], Care Everywhere and PACS.     Outside Records: Chart review, Scanned Images Icon in the encounters tab.

## 2018-06-18 ENCOUNTER — OFFICE VISIT (OUTPATIENT)
Dept: NEUROLOGY | Facility: CLINIC | Age: 73
End: 2018-06-18
Payer: COMMERCIAL

## 2018-06-18 ENCOUNTER — TELEPHONE (OUTPATIENT)
Dept: NEUROLOGY | Facility: CLINIC | Age: 73
End: 2018-06-18

## 2018-06-18 ENCOUNTER — PRE VISIT (OUTPATIENT)
Dept: NEUROLOGY | Facility: CLINIC | Age: 73
End: 2018-06-18

## 2018-06-18 VITALS
HEIGHT: 63 IN | RESPIRATION RATE: 24 BRPM | BODY MASS INDEX: 23.88 KG/M2 | WEIGHT: 134.8 LBS | HEART RATE: 89 BPM | DIASTOLIC BLOOD PRESSURE: 80 MMHG | SYSTOLIC BLOOD PRESSURE: 136 MMHG | TEMPERATURE: 97.8 F | OXYGEN SATURATION: 99 %

## 2018-06-18 DIAGNOSIS — G20.A1 PARKINSON'S DISEASE (H): ICD-10-CM

## 2018-06-18 DIAGNOSIS — R41.3 MEMORY PROBLEM: Primary | ICD-10-CM

## 2018-06-18 DIAGNOSIS — R41.3 MEMORY PROBLEM: ICD-10-CM

## 2018-06-18 PROBLEM — G31.84 MILD COGNITIVE IMPAIRMENT: Status: ACTIVE | Noted: 2018-04-04

## 2018-06-18 RX ORDER — RIVASTIGMINE 13.3 MG/24H
1 PATCH, EXTENDED RELEASE TRANSDERMAL DAILY
Qty: 1 PATCH | Refills: 11 | Status: SHIPPED | OUTPATIENT
Start: 2018-06-18 | End: 2018-06-19

## 2018-06-18 ASSESSMENT — MOVEMENT DISORDERS SOCIETY - UNIFIED PARKINSONS DISEASE RATING SCALE (MDS-UPDRS)
SPEECH: SLIGHT: MY SPEECH IS SOFT, SLURRED OR UNEVEN, BUT IT DOES NOT CAUSE OTHERS TO ASK ME TO REPEAT MYSELF.
HYGIENE: NORMAL: NOT AT ALL (NO PROBLEMS).
TREMOR: SLIGHT: SHAKING OR TREMOR OCCURS BUT DOES NOT CAUSE PROBLEMS WITH ANY ACTIVITIES.
HANDWRITING: SLIGHT: MY WRITING IS SLOW, CLUMSY OR UNEVEN, BUT ALL WORDS ARE CLEAR.
DRESSING: NORMAL: NOT AT ALL (NO PROBLEMS).
TURNING_IN_BED: NORMAL: NOT AT ALL (NO PROBLEMS).
TOTAL_SCORE: 4
GETTING_OUT_OF_BED_CAR_DEEP_CHAIR: NORMAL: NOT AT ALL (NO PROBLEMS).
WALKING_AND_BALANCE: SLIGHT: I AM SLIGHTLY SLOW OR MAY DRAG A LEG.  I NEVER USE A WALKING AID.
FREEZING: NORMAL: NOT AT ALL (NO PROBLEMS).
EATING_TASKS: NORMAL: NOT AT ALL (NO PROBLEMS).
SALIVA_AND_DROOLING: NORMAL: NOT AT ALL (NO PROBLEMS).
CHEWING_AND_SWALLOWING: NORMAL: NO PROBLEMS.
HOBBIES_AND_OTHER_ACTIVITIES: NORMAL:  NOT AT ALL (NO PROBLEMS).

## 2018-06-18 ASSESSMENT — PAIN SCALES - GENERAL: PAINLEVEL: NO PAIN (0)

## 2018-06-18 NOTE — PROGRESS NOTES
"PATIENT: Paieg eMrcado    : 1945    VLADIMIR: 2018    REASON FOR VISIT: Parkinson's disease (PD) follow up.    HPI: Ms. Paige Mercado is a 72 year old right - handed  female who came to the [facility] neurology clinic accompanied by her friend, who is in the waiting area for a follow up visit.  She was last seen in the clinic on 3/15/2018 by Dr. Styles to establish care for PD.  Since then she has met with Zainab Sanches PharmD & was started on Rivastigmine patch.    She was diagnosed with PD in Dec 2012.  She noticed that she was falling off the bike a few times that led her to see Dr. Mcghee.  She has been followed by Dr. Mcghee in [facility].  Due to driving distance she decided to switch providers.  She was seen by Dr. De Los Santos a couple of times.     Tremor is controlled.  She denies wearing off motor symptoms.  She is asking \"can you get rid of my tremor for good.\"  She exercises regularly.  She goes to the [facility] 3 times a week to exercise.     PD Medications 7 am 11 am 3 pm 8 pm 10 pm   Sinemet 25/100 mg  2 2 2 2    Ropinirole 1 mg 1 1 1         She is not sure why she takes Quetiapine.  She denies hallucinations.  She sees a psychiatrist for anxiety.    She is on Rivastigmine.  She reports improvement in her memory.  She plans to stay on taking 9.5 mg dose until the pills are completed.  She has noticed added benefit when she increased her dose from 4.6 to 9.5 mg.  She is interested in increase the dose.  Denies side effects including dizziness, HA, or GI symptoms.     MEDICATIONS:   Medication Sig     acetaminophen (TYLENOL) 500 MG  Take 1 tablet (500 mg) by mouth every 4 hours as needed for mild pain     aspirin (ASPIRIN LOW DOSE) 81 MG  81mg tablet @ 7am     carbidopa-levodopa (SINEMET)  MG 2 tabs @ 7am, 11am, 3pm and 8pm     Cholecalciferol (VITAMIN D) 2000 UNITS 2000 units @ 7am     citalopram (CELEXA) 40 MG tablet 40 mg tablet by mouth @ 10pm     levothyroxine (SYNTHROID) 50 " "MCG  50mcg tablet by mouth @ 7am     lisinopril (PRINIVIL/ZESTRIL) 2.5 MG  2.5mg tablet by mouth @ 10pm     polyethylene glycol (MIRALAX/GLYCOLAX) Packet Take 17 g by mouth daily as needed for constipation     QUEtiapine (SEROQUEL) 100 MG  100mg tablet @ 10pm; takes with a 25mg tablet     QUEtiapine (SEROQUEL) 25 MG  1 tab @ 7am 1 tablet @ 10pm (along with 100mg at bedtime)     rivastigmine (EXELON) 9.5 MG/24HR 24 hr patch Place 1 patch onto the skin daily     rOPINIRole (REQUIP) 1 MG tablet 1mg tablet by mouth @ 7am, 11am and 3pm     simvastatin (ZOCOR) 20 MG tablet 20mg tablet by mouth @ 10pm     SUMAtriptan (IMITREX) 25 MG tablet Take 1 tablet (25 mg) by mouth at onset of headache for migraine       ALLERGIES: Ciprofloxacin; Sulfa drugs; and Xanax [alprazolam]    PHYSICAL EXAM:    VITAL SIGNS:  Blood pressure 136/80, pulse 89, temperature 97.8  F (36.6  C), temperature source Oral, resp. rate 24, height 1.6 m (5' 3\"), weight 61.1 kg (134 lb 12.8 oz), SpO2 99 %.  Body mass index is 23.88 kg/(m^2).    GENERAL:  Ms. Mercado is a pleasant  female who is well-groomed and well-developed sitting comfortably in the exam room without any distress.  Affect is appropriate.    MOVEMENT DISORDERS ASSESSMENT: (Last Sinemet was about 4 hrs ago)  Speech: Slight loss of expression and volume.  Facial Expression: Minimal hypomimia.  Rest Tremor: Slight and infrequently present in Lt hand.  Rare in chin.   Action/Postural Tremor: Slight postural tremor bilaterally; no action tremor.    Rigidity: Slight in all extremities.   Finger Taps: Normal.   Hand opening & closing: Mild slowing bilaterally.   Hand pronation & supination: Mild slowing and reduction in amplitude bilaterally.   Leg Agility: Normal.   Toe Tapping:  Mild slowing and reduction in amplitude bilaterally.   Arising from chair - arms folded across chest: Normal.  Posture: Normal erect.  Gait: Walks slowly, with normal steps. No festination or " propulsion.  Postural Stability: Retropulsion, but recovers unaided.  Body Bradykinesia: Minimal slowness.    ASSESSMENT:    1)  Parkinson's Disease:  Patient has a 6 year history of PD.  Motor symptoms are stable.    2)  Memory problems:  Improved on Rivastigmine.     PLAN:    1)  Will stay on the same dose of antiparkinsonian medications.     PD Medications 7 am 11 am 3 pm 8 pm   Sinemet 25/100 mg  2 2 2 2   Ropinirole 1 mg 1 1 1      __  Will continue exercising regularly.     2)  After completing Rivastigmine 9.5 mg patches, will increase dose to 13.3 mg.  If she has trouble with insurance, she'll call us.   Phone number given.  __  In Epic, it was noted that pt had 2 upcoming appointments at [place] on 7/2.  Pt didn't know anything about it.  She was encouraged to call the clinic & find out.   Pt was called on 6/19/2018.  Apparently her daughter made the appointments.  Pt wasn't able to recall what her daughter told her regarding the appointments & what they were for.   __  At some point, since pt lives by herself, it might be reasonable to have her see OT to insure her safety to live independently.     Will return to our clinic in 3 months or sooner as needed.    The total time spent with the patient was 50 minutes, and greater than 50% of this time was spent in counseling and coordination of care.    ED Gibbons,  CNP  [facility]

## 2018-06-18 NOTE — MR AVS SNAPSHOT
After Visit Summary   6/18/2018    Paige Mercado    MRN: [id number]           Patient Information     Date Of Birth          1945        Visit Information        Provider Department      6/18/2018 10:30 AM Regla Santos APRN CNP [facility]        Today's Diagnoses     Memory problem    -  1    Parkinson's disease (H)          Care Instructions    June 18, 2018    Dear Ms. Paige Mercado,    Thank you for coming today.  During your visit, we have discussed the following:     __  Stay on the same dose of antiparkinsonian medications.       PD Medications 7 am 11 am 3 pm 8 pm   Sinemet 25/100 mg  2 2 2 2   Ropinirole 1 mg 1 1 1        __  After you complete Rivastigmine 9.5 mg patches, increase the dose to 13.3 mg.  If you have trouble with you insurance not covering the patch, call us.     __  Continue to exercise regularly.     __  Return in 3 months. You may return sooner as needed.      For questions, call us at [phone]    Fax number: [phone]    ED Gibbons, CNP  [facility]            Follow-ups after your visit        Your next 10 appointments already scheduled     Sep 18, 2018  9:10 AM CDT   (Arrive by 8:55 AM)   New Movement Disorder with Keith Styles MD   [facility] ([email or web address])    [address] [place] [id number] [phone]              Who to contact     Please call your clinic at [phone] to:    Ask questions about your health    Make or cancel appointments    Discuss your medicines    Learn about your test results    Speak to your doctor            Additional Information About Your Visit        MyChart Information     [employer] gives you secure access to your electronic health record. If you see a primary care provider, you can also send messages to your care team and make appointments. If you have questions, please call your primary care clinic.  If you do not have a  "primary care provider, please call [phone] and they will assist you.      [employer] is an electronic gateway that provides easy, online access to your medical records. With [employer], you can request a clinic appointment, read your test results, renew a prescription or communicate with your care team.     To access your existing account, please contact your [facility] or call [phone] for assistance.        Care EveryWhere ID     This is your Care EveryWhere ID. This could be used by other organizations to access your [facility] medical records  [id number]        Your Vitals Were     Pulse Temperature Respirations Height Pulse Oximetry Breastfeeding?    89 97.8  F (36.6  C) (Oral) 24 1.6 m (5' 3\") 99% No    BMI (Body Mass Index)                   23.88 kg/m2            Blood Pressure from Last 3 Encounters:   06/18/18 136/80   03/22/18 138/76   03/15/18 140/77    Weight from Last 3 Encounters:   06/18/18 61.1 kg (134 lb 12.8 oz)   03/22/18 66.7 kg (147 lb)   03/15/18 59.2 kg (130 lb 8 oz)              Today, you had the following     No orders found for display         Today's Medication Changes          These changes are accurate as of 6/18/18 11:27 AM.  If you have any questions, ask your nurse or doctor.               These medicines have changed or have updated prescriptions.        Dose/Directions    * rivastigmine 9.5 MG/24HR 24 hr patch   Commonly known as:  EXELON   This may have changed:  Another medication with the same name was added. Make sure you understand how and when to take each.   Used for:  Parkinson disease (H)   Changed by:  Regla Santos, APRN CNP        Dose:  1 patch   Place 1 patch onto the skin daily   Quantity:  30 patch   Refills:  11       * rivastigmine 13.3 MG/24HR 24 hr patch   Commonly known as:  EXELON   This may have changed:  You were already taking a medication with the same name, and this prescription was added. Make sure you understand " how and when to take each.   Used for:  Memory problem, Parkinson's disease (H)   Changed by:  Regla Santos, ED CNP        Dose:  1 patch   Place 1 patch onto the skin daily   Quantity:  1 patch   Refills:  11       * Notice:  This list has 2 medication(s) that are the same as other medications prescribed for you. Read the directions carefully, and ask your doctor or other care provider to review them with you.         Where to get your medicines      These medications were sent to [facility] [id number] - [facility] [address] [facility]  [address] [facility] [address]     Phone:  [phone]     rivastigmine 13.3 MG/24HR 24 hr patch                Primary Care Provider Office Phone # Fax #    [facility] [phone]       [address] [address]        Equal Access to Services     FERNANDA GARLAND AH: Hadii everett ku hadasho Soomaali, waaxda luqadaha, qaybta kaalmada adeegyada, waxay kimmyin haysidra mena . So [facility] [phone].    ATENCIÓN: Si habla español, tiene a eller disposición servicios gratuitos de asistencia lingüística. Roname al [phone].    We comply with applicable federal civil rights laws and [place] laws. We do not discriminate on the basis of race, color, national origin, age, disability, sex, sexual orientation, or gender identity.            Thank you!     Thank you for choosing [facility]  for your care. Our goal is always to provide you with excellent care. Hearing back from our patients is one way we can continue to improve our services. Please take a few minutes to complete the written survey that you may receive in the mail after your visit with us. Thank you!             Your Updated Medication List - Protect others around you: Learn how to safely use, store and throw away your medicines at www.disposemymeds.org.          This list is accurate as of  6/18/18 11:27 AM.  Always use your most recent med list.                   Brand Name Dispense Instructions for use Diagnosis    acetaminophen 500 MG tablet    TYLENOL     Take 1 tablet (500 mg) by mouth every 4 hours as needed for mild pain        ASPIRIN LOW DOSE 81 MG tablet   Generic drug:  aspirin     30 tablet    81mg tablet @ 7am        carbidopa-levodopa  MG per tablet    SINEMET    720 tablet    2 tabs @ 7am, 11am, 3pm and 8pm    Parkinsonism, unspecified Parkinsonism type (H)       celeXA 40 MG tablet   Generic drug:  citalopram     90 tablet    40 mg tablet by mouth @ 10pm        lisinopril 2.5 MG tablet    PRINIVIL/Zestril    90 tablet    2.5mg tablet by mouth @ 10pm        polyethylene glycol Packet    MIRALAX/GLYCOLAX    7 packet    Take 17 g by mouth daily as needed for constipation        * QUEtiapine 100 MG tablet    SEROquel    60 tablet    100mg tablet @ 10pm; takes with a 25mg tablet        * QUEtiapine 25 MG tablet    SEROquel    60 tablet    1 tab @ 7am 1 tablet @ 10pm (along with 100mg at bedtime)        * rivastigmine 9.5 MG/24HR 24 hr patch    EXELON    30 patch    Place 1 patch onto the skin daily    Parkinson disease (H)       * rivastigmine 13.3 MG/24HR 24 hr patch    EXELON    1 patch    Place 1 patch onto the skin daily    Memory problem, Parkinson's disease (H)       rOPINIRole 1 MG tablet    REQUIP    270 tablet    1mg tablet by mouth @ 7am, 11am and 3pm        simvastatin 20 MG tablet    ZOCOR    30 tablet    20mg tablet by mouth @ 10pm        SUMAtriptan 25 MG tablet    IMITREX    30 tablet    Take 1 tablet (25 mg) by mouth at onset of headache for migraine        SYNTHROID 50 MCG tablet   Generic drug:  levothyroxine     90 tablet    50mcg tablet by mouth @ 7am        vitamin D 2000 units Caps     30 capsule    2000 units @ 7am        * Notice:  This list has 4 medication(s) that are the same as other medications prescribed for you. Read the directions carefully, and ask your  doctor or other care provider to review them with you.

## 2018-06-18 NOTE — PATIENT INSTRUCTIONS
June 18, 2018    Dear Ms. Paige MCKEON Rogelio,    Thank you for coming today.  During your visit, we have discussed the following:     __  Stay on the same dose of antiparkinsonian medications.       PD Medications 7 am 11 am 3 pm 8 pm   Sinemet 25/100 mg  2 2 2 2   Ropinirole 1 mg 1 1 1        __  After you complete Rivastigmine 9.5 mg patches, increase the dose to 13.3 mg.  If you have trouble with you insurance not covering the patch, call us.     __  Continue to exercise regularly.     __  Return in 3 months. You may return sooner as needed.      For questions, call us at [phone]    Fax number: [phone]    ED Gibbons, CNP  [facility]  3

## 2018-06-18 NOTE — NURSING NOTE
Chief Complaint   Patient presents with     RECHECK     [facility]- MOVEMENT DISORDER, 3 MONTHS F/U     Dino Joshi, CMA

## 2018-06-18 NOTE — TELEPHONE ENCOUNTER
[facility] Call Center    Phone Message    May a detailed message be left on voicemail: no    Reason for Call: Other: Pharmacy calling needing a new Rx of the medication rivastigmine (EXELON) 13.3 MG/24HR 24 hr patch. script says for 1 patch, Pharmacy can only do Packs of the Patches of 30 count. Pharmacy asking for script to be re-written and re-faxed to [phone]     Action Taken: Message routed to:  [facility] ([facility]

## 2018-06-19 RX ORDER — RIVASTIGMINE 13.3 MG/24H
1 PATCH, EXTENDED RELEASE TRANSDERMAL DAILY
Qty: 30 PATCH | Refills: 11 | Status: SHIPPED | OUTPATIENT
Start: 2018-06-19 | End: 2018-11-14

## 2018-06-19 NOTE — TELEPHONE ENCOUNTER
Prior Authorization Specialty Medication Request    Medication/Dose: Pharmacy calling needing a new Rx of the medication rivastigmine (EXELON) 13.3 MG/24HR 24 hr patch. script says for 1 patch, Pharmacy can only do Packs of the Patches of 30 count. Pharmacy asking for script to be re-written and re-faxed to [phone].  ICD code (if different than what is on RX):    Memory problem [R41.3]  - Primary       Parkinson's disease (H) [G20]           Previously Tried and Failed:  NA    Important Lab Values: NA  Rationale: NA    Insurance Name: [facility]  Insurance ID: [id number]   Insurance Phone Number: NA    Pharmacy Information (if different than what is on RX)  Name:  [facility]  Phone:  [phone]

## 2018-06-19 NOTE — TELEPHONE ENCOUNTER
Central Prior Authorization Team   Phone: [phone]      Prior Authorization Not Needed per pharmacy    Medication: rivastigmine (EXELON) 13.3 MG/24HR 24 hr patch-PA Not needed  Insurance Company: [employer] - Phone [phone] Fax [phone]  Expected CoPay:      Pharmacy Filling the Rx: [facility] [address] [place] [address] WILLIAM BEEBE AT [facility]  Pharmacy Notified: Yes  Patient Notified: No      Per pharmacy, insurance prefers brand. Processed script for $10 co-pay.

## 2018-07-02 ENCOUNTER — HOSPITAL ENCOUNTER (OUTPATIENT)
Dept: NEUROLOGY | Facility: CLINIC | Age: 73
Setting detail: THERAPIES SERIES
Discharge: STILL ADMITTED OR ADMINISTRATIVE | End: 2018-07-02
Attending: PSYCHIATRY & NEUROLOGY

## 2018-07-02 ENCOUNTER — HOSPITAL ENCOUNTER (OUTPATIENT)
Dept: NEUROLOGY | Facility: CLINIC | Age: 73
Setting detail: THERAPIES SERIES
Discharge: STILL ADMITTED OR ADMINISTRATIVE | End: 2018-07-02
Attending: SOCIAL WORKER

## 2018-07-02 DIAGNOSIS — G43.909 MIGRAINE: ICD-10-CM

## 2018-07-02 DIAGNOSIS — I10 HTN (HYPERTENSION), BENIGN: ICD-10-CM

## 2018-07-02 DIAGNOSIS — G20.A1 PD (PARKINSON'S DISEASE) (H): ICD-10-CM

## 2018-07-02 LAB
ALBUMIN SERPL-MCNC: 4 G/DL (ref 3.5–5)
ALP SERPL-CCNC: 74 U/L (ref 45–120)
ALT SERPL W P-5'-P-CCNC: <9 U/L (ref 0–45)
ANION GAP SERPL CALCULATED.3IONS-SCNC: 8 MMOL/L (ref 5–18)
AST SERPL W P-5'-P-CCNC: 15 U/L (ref 0–40)
BILIRUB SERPL-MCNC: 0.8 MG/DL (ref 0–1)
BUN SERPL-MCNC: 18 MG/DL (ref 8–28)
CALCIUM SERPL-MCNC: 9.5 MG/DL (ref 8.5–10.5)
CHLORIDE BLD-SCNC: 103 MMOL/L (ref 98–107)
CO2 SERPL-SCNC: 28 MMOL/L (ref 22–31)
CREAT SERPL-MCNC: 0.68 MG/DL (ref 0.6–1.1)
ERYTHROCYTE [DISTWIDTH] IN BLOOD BY AUTOMATED COUNT: 13.2 % (ref 11–14.5)
GFR SERPL CREATININE-BSD FRML MDRD: >60 ML/MIN/1.73M2
GLUCOSE BLD-MCNC: 84 MG/DL (ref 70–125)
HCT VFR BLD AUTO: 38.5 % (ref 35–47)
HGB BLD-MCNC: 12.8 G/DL (ref 12–16)
MCH RBC QN AUTO: 31 PG (ref 27–34)
MCHC RBC AUTO-ENTMCNC: 33.2 G/DL (ref 32–36)
MCV RBC AUTO: 93 FL (ref 80–100)
PLATELET # BLD AUTO: 341 THOU/UL (ref 140–440)
PMV BLD AUTO: 8.8 FL (ref 8.5–12.5)
POTASSIUM BLD-SCNC: 4.5 MMOL/L (ref 3.5–5)
PROT SERPL-MCNC: 7 G/DL (ref 6–8)
RBC # BLD AUTO: 4.13 MILL/UL (ref 3.8–5.4)
SODIUM SERPL-SCNC: 139 MMOL/L (ref 136–145)
TSH SERPL DL<=0.005 MIU/L-ACNC: 1.66 UIU/ML (ref 0.3–5)
VIT B12 SERPL-MCNC: 227 PG/ML (ref 213–816)
WBC: 4.5 THOU/UL (ref 4–11)

## 2018-07-03 ENCOUNTER — COMMUNICATION - HEALTHEAST (OUTPATIENT)
Dept: NEUROLOGY | Facility: CLINIC | Age: 73
End: 2018-07-03

## 2018-07-03 DIAGNOSIS — E53.8 VITAMIN B12 DEFICIENCY: ICD-10-CM

## 2018-07-18 ENCOUNTER — HOSPITAL ENCOUNTER (OUTPATIENT)
Dept: OCCUPATIONAL THERAPY | Age: 73
Setting detail: THERAPIES SERIES
Discharge: STILL ADMITTED OR ADMINISTRATIVE | End: 2018-07-18
Attending: PSYCHIATRY & NEUROLOGY

## 2018-07-18 DIAGNOSIS — R41.89 COGNITIVE IMPAIRMENT: ICD-10-CM

## 2018-08-03 ENCOUNTER — AMBULATORY - HEALTHEAST (OUTPATIENT)
Dept: NEUROLOGY | Facility: CLINIC | Age: 73
End: 2018-08-03

## 2018-08-06 ENCOUNTER — TELEPHONE (OUTPATIENT)
Dept: PHARMACY | Facility: CLINIC | Age: 73
End: 2018-08-06

## 2018-08-06 NOTE — TELEPHONE ENCOUNTER
Patient called regarding Exelon patches and coverage with [employer]. She states that her [employer] statement shows she is going to enter the donut hole in $500 and she is concerned that the cost of her medications will go up. I reassured her that the Assistance Fund will still be active while she is int he donut hole, so she will not need to pay more for the Exelon patches going forward.     Zainab Sanches, Pharm.D.  Medication Therapy Management Pharmacist  Phone: [phone]

## 2018-08-07 NOTE — TELEPHONE ENCOUNTER
"Spoke with [employer] representative, who states that patient is in the coverage gap and will now have to spend $3373.58 out-of-pocket. Her [facility] pharmacy is considered \"preferred.\" The copays during the coverage gap will vary based on the medication tier levels (ie: tier 1 she will pay 44%, tier 1 brand 35%, tier 2 44% etc). We cannot predict the copays at this time.     Discussed with patient and recommended that she inform the [facility] pharmacy so that she can use the Assistance Fund for all of her PD medications (Sinemet, ropinirole, and Exelon). Patient expressed understanding and will call with future questions.     Zainab Sanches, Pharm.D.  Medication Therapy Management Pharmacist  Phone: [phone]  "

## 2018-08-07 NOTE — TELEPHONE ENCOUNTER
Patient called requesting that I speak with Na about her drug costs and going into the Donut Hole. She asked that I call: [phone]. Her total drug costs out of pocket is $1200.     Na ID number: [id number]     Zainab Sanches, Pharm.D.  Medication Therapy Management Pharmacist  Phone: [phone]

## 2018-08-22 ENCOUNTER — TELEPHONE (OUTPATIENT)
Dept: PHARMACY | Facility: CLINIC | Age: 73
End: 2018-08-22

## 2018-08-22 NOTE — TELEPHONE ENCOUNTER
Patient called [facility] pharmacist and is requesting a call back. She would like to discuss Exelon and her out-of-pocket costs.    Zainab Sanches, Pharm.D.  Medication Therapy Management Pharmacist  Phone: [phone]

## 2018-08-23 NOTE — TELEPHONE ENCOUNTER
Spoke with patient's [facility] pharmacy and they were able to refill the medication for a $10 copay with [employer] insurance and the Assistance Fund program. Called patient to inform her of this.     Zainab Sanches, Pharm.D.  Medication Therapy Management Pharmacist  Phone: [phone]

## 2018-09-10 NOTE — PROGRESS NOTES
Diagnosis/Summary/Recommendations:    PATIENT: Paige Mercado  73 year old female     : 1945    VLADIMIR: 2018    Parkinson    Rosalva O'Alexis    Patch has been working very well at the highest dose.   Things has gotten recently.     Constipation - she is taking something for this  She is taking something from cub called runs smoothly - tea bag and drinking more water.   She has good success     Bladder control is okay    No falls.   Independence exercise program 3-4 times per week for one week  Lives in [facility].   Using [employer]  Rosalva daughter is not working and  - at [facility] with older   She is at home today       Medications     7am 11am 3pm 8pm 10pm      Acetaminophen 500mg prn                  Aspirin 81mg 1                  Carbidopa/levodopa 25/100 Sinemet  2 2 2 2         Cholecalciferol vitamin d 2000 units 1                  Citalopram celexa 40mg 1                  Cyanocobalamin vitamin B12 1000mcg tablets         Levothyroxine synthroid 50 mcg 1                  Lisinopril prinivil/zestril 2.5mg             1      nonformulary runs smoothly tea from cub     1    Polyethylene glycol miralax As needed                  Quetiapine 100mg seroquel             1      Quetiapine 25mg seroquel 1          1      Rivastigmine exelon 13.3mg/24 hr patch 1        Ropinirole requip 1mg 1 1 1            Simvastatin zocor 20mg             1      Sumatriptan imitrex 25mg As needed                                                                          History obtained from patient     Having more problems with cognition  Started on b12 as was normal but on the low end of normal.     She has more left hand tremor when she is anxious.     PLAN  Dose increase in the seroquel at night to 150mg == 100mg + 2 x 25mg    Consider adding folic acid (folate) and vitamin b6 (pyridoxine) over the counter.     Return back in 3 months to see Alyssa Santos NP    Discussed using mindfulness or other strategies  to calm the tremor   Will add an additional sinemet to be taken as needed for stressful related increased tremor.         Medications     7am 11am 3pm 8pm 10pm      Acetaminophen 500mg prn                  Aspirin 81mg 1                  Carbidopa/levodopa 25/100 Sinemet  2 2 2 2         Cholecalciferol vitamin d 2000 units 1                  Citalopram celexa 40mg 1                  Cyanocobalamin vitamin B12 1000mcg tablets 1        Levothyroxine synthroid 50 mcg 1                  Lisinopril prinivil/zestril 2.5mg             1      nonformulary runs smoothly tea from cub     1    Polyethylene glycol miralax As needed                  Quetiapine 100mg seroquel             1      Quetiapine 25mg seroquel 1          2      Rivastigmine exelon 13.3mg/24 hr patch 1        Ropinirole requip 1mg 1 1 1            Simvastatin zocor 20mg             1      Sumatriptan imitrex 25mg As needed                                                                      Coding statement:   Duration of  Services: patient care and care coordination was 25 minutes  Greater than 50% of this visit was spent in counseling and coordination of care.     Keith Styles MD     ______________________________________    Last visit date and details:      PATIENT: Paige Mercado     : 1945     VLADIMIR: 2018     REASON FOR VISIT: Parkinson's disease (PD) follow up.     HPI: Ms. Paige Mercado is a 72 year old right - handed  female who came to the [facility] neurology clinic accompanied by her friend, who is in the waiting area for a follow up visit.  She was last seen in the clinic on 3/15/2018 by Dr. Styles to establish care for PD.  Since then she has met with Zainab Sanches, PharmD & was started on Rivastigmine patch.     She was diagnosed with PD in Dec 2012.  She noticed that she was falling off the bike a few times that led her to see Dr. Mcghee.  She has been followed by Dr. Mcghee in [facility].  Due to driving distance she  "decided to switch providers.  She was seen by Dr. De Los Santos a couple of times.      Tremor is controlled.  She denies wearing off motor symptoms.  She is asking \"can you get rid of my tremor for good.\"  She exercises regularly.  She goes to the [facility] 3 times a week to exercise.      PD Medications 7 am 11 am 3 pm 8 pm 10 pm   Sinemet 25/100 mg  2 2 2 2     Ropinirole 1 mg 1 1 1             She is not sure why she takes Quetiapine.  She denies hallucinations.  She sees a psychiatrist for anxiety.     She is on Rivastigmine.  She reports improvement in her memory.  She plans to stay on taking 9.5 mg dose until the pills are completed.  She has noticed added benefit when she increased her dose from 4.6 to 9.5 mg.  She is interested in increase the dose.  Denies side effects including dizziness, HA, or GI symptoms.      MEDICATIONS:        Medication Sig     acetaminophen (TYLENOL) 500 MG  Take 1 tablet (500 mg) by mouth every 4 hours as needed for mild pain     aspirin (ASPIRIN LOW DOSE) 81 MG  81mg tablet @ 7am     carbidopa-levodopa (SINEMET)  MG 2 tabs @ 7am, 11am, 3pm and 8pm     Cholecalciferol (VITAMIN D) 2000 UNITS 2000 units @ 7am     citalopram (CELEXA) 40 MG tablet 40 mg tablet by mouth @ 10pm     levothyroxine (SYNTHROID) 50 MCG  50mcg tablet by mouth @ 7am     lisinopril (PRINIVIL/ZESTRIL) 2.5 MG  2.5mg tablet by mouth @ 10pm     polyethylene glycol (MIRALAX/GLYCOLAX) Packet Take 17 g by mouth daily as needed for constipation     QUEtiapine (SEROQUEL) 100 MG  100mg tablet @ 10pm; takes with a 25mg tablet     QUEtiapine (SEROQUEL) 25 MG  1 tab @ 7am 1 tablet @ 10pm (along with 100mg at bedtime)     rivastigmine (EXELON) 9.5 MG/24HR 24 hr patch Place 1 patch onto the skin daily     rOPINIRole (REQUIP) 1 MG tablet 1mg tablet by mouth @ 7am, 11am and 3pm     simvastatin (ZOCOR) 20 MG tablet 20mg tablet by mouth @ 10pm     SUMAtriptan (IMITREX) 25 MG tablet Take 1 tablet (25 mg) by mouth at onset of " "headache for migraine         ALLERGIES: Ciprofloxacin; Sulfa drugs; and Xanax [alprazolam]     PHYSICAL EXAM:     VITAL SIGNS:  Blood pressure 136/80, pulse 89, temperature 97.8  F (36.6  C), temperature source Oral, resp. rate 24, height 1.6 m (5' 3\"), weight 61.1 kg (134 lb 12.8 oz), SpO2 99 %.  Body mass index is 23.88 kg/(m^2).     GENERAL:  Ms. Mercado is a pleasant  female who is well-groomed and well-developed sitting comfortably in the exam room without any distress.  Affect is appropriate.     MOVEMENT DISORDERS ASSESSMENT: (Last Sinemet was about 4 hrs ago)  Speech: Slight loss of expression and volume.  Facial Expression: Minimal hypomimia.  Rest Tremor: Slight and infrequently present in Lt hand.  Rare in chin.   Action/Postural Tremor: Slight postural tremor bilaterally; no action tremor.    Rigidity: Slight in all extremities.   Finger Taps: Normal.   Hand opening & closing: Mild slowing bilaterally.   Hand pronation & supination: Mild slowing and reduction in amplitude bilaterally.   Leg Agility: Normal.   Toe Tapping:  Mild slowing and reduction in amplitude bilaterally.   Arising from chair - arms folded across chest: Normal.  Posture: Normal erect.  Gait: Walks slowly, with normal steps. No festination or propulsion.  Postural Stability: Retropulsion, but recovers unaided.  Body Bradykinesia: Minimal slowness.     ASSESSMENT:     1)  Parkinson's Disease:  Patient has a 6 year history of PD.  Motor symptoms are stable.    2)  Memory problems:  Improved on Rivastigmine.      PLAN:     1)  Will stay on the same dose of antiparkinsonian medications.      PD Medications 7 am 11 am 3 pm 8 pm   Sinemet 25/100 mg  2 2 2 2   Ropinirole 1 mg 1 1 1        __  Will continue exercising regularly.      2)  After completing Rivastigmine 9.5 mg patches, will increase dose to 13.3 mg.  If she has trouble with insurance, she'll call us.   Phone number given.  __  In Epic, it was noted that pt had 2 upcoming " appointments at [place] on 7/2.  Pt didn't know anything about it.  She was encouraged to call the clinic & find out.   Pt was called on 6/19/2018.  Apparently her daughter made the appointments.  Pt wasn't able to recall what her daughter told her regarding the appointments & what they were for.   __  At some point, since pt lives by herself, it might be reasonable to have her see OT to insure her safety to live independently.      Will return to our clinic in 3 months or sooner as needed.     The total time spent with the patient was 50 minutes, and greater than 50% of this time was spent in counseling and coordination of care.     Alyssa Santos, ED,  CNP  [facility]         Parkinsonism           - discussed to make sure that her sinemet medication should be one hour before protein or two hours             after protein            - may need as needed sinemet for tremor  Mood disorder - anxiety she is relatively well managed with citalopram 40mg/day and seroquel 25mg in the morning and 125mg in the evening. She has not been on remeron or pimavanserin (nuplazid)  Driving issue  Cognitive issues                         - neuropsychological evaluation was ordered but she may not proceed wit th is              - she has not had a formal moca                        - she and her daughter was informed about the rivastigmine/exelon patch   Constipation -               - need to be more aggressive daily regimen - miralax or/and senokot as well as diet     Briefly discussed new treatments  rytary   nuplazid     Educational program  1st Saturday in June     She could meet with a pharmacist that works here - Zainab Sanches, Pharm D     She is doing an exercise program - she is walking in the summer and walks in the dome in [facility] [employer]  She does go to the parkinson Classes.      Signed up for dimple      Return to see Alyssa Santos in 3-4 months  May need to call daughter at the time of the visit to help with  discussion of treatment  We did this today and it worked well as daughter is home school ing 2/5 kids.         Medications     7am 11am 3pm 8pm 10pm      Acetaminophen 500mg prn                  Aspirin 81mg 1                  Carbidopa/levodopa 25/100 Sinemet  2 2 2 2         Cholecalciferol vitamin d2000 1                  Citalopram celexa 40mg 1                  Levothyroxine synthroid 50 mcg 1                  Lisinopril prinivil/zestril 2.5mg             1      Polyethylene glycol miralax As needed                  Quetiapine 100mg seroquel             1      Quetiapine 25mg seroquel 1          1      Ropinirole requip 1mg 1 1 1            Simvastatin zocor 20mg             1      Sumatriptan imitrex 25mg As needed                                                                          Keith Styles MD  _____________________________________________________________________  PATIENT: Paige Mercado  72 year old female   : 1945  VLADIMIR: March 15, 2018     Consult requested by pcp/other  Outside records reviewed and revealed  - inserted.   History obtained from patient  History of Present Illness  71 yo woman initially seen by Dr. Palm in  and most recently in 2017 - details from two visits at end of note.  Diagnosed in   It was 2012     She was referred by another patient RENA Fenton  She needs to take a test about driving issue at [facility]  May be seeing an OT at [place] for an evaluation     She has not had falls  She has not had hallucinations  She has done physical therapy at [facility]  Wears glasses. She has signed up may 1, 2018 right and then left cataract surgery.  Denies hearing loss  Has some constipation and uses miralax as needed and may have one bowel movement per week  Has bladder issues  -  She is not wearing a pad. She has urgency and when she is zippping her pants.   Will see urology Dr Mtz. She has nocturia 1-2/noc  Falls asleep okay and can go back to sleep  No  clear snoring. She does not know if she has involuntary movements at night  No family history of parkinson  She has problems with her balance on her bike  She has had loss of smell  She has left? Shoulder problem  Right handed with onset on the left side  Nonsmoker. Denies lung problems  Recent cold  Heart - has been taking blood pressure medication for a while  Endo: - she has been on thyroid medications for a long time. Denies diabetes  She has been on cholesterol medication for about 10 yrs  Denies  Anemia  cipro allergy and sulfa allergy  Lost 40 lbs in 2 months.  Has not seen gi  Has imitrex on her medication list - had migraines in the past which resolved with menopause  Mood issues. 17 yrs of mood problems  Has a doctor she sees at [facility] Dr Maria Del Carmen Mackay  She had been weaned off xanax and had an increased dose of her quetiapine   She has had problems with bladder infections which has caused confusion - this was in the summer and again around the turn of year December/January      1. Forgetfullness/memory/word finding problems  2. New Rxs  3. Constipation  4. Eliminate pills if possible  5. Handwriting  6. Tremor     Spoke with daughter (on phone) with patient in the visit.         Medications     7am 11am 3pm 8pm 10pm     Acetaminophen 500mg prn             Aspirin 81mg 1             Carbidopa/levodopa 25/100 Sinemet  2 2 2 2       Cholecalciferol vitamin d2000 1             Citalopram celexa 40mg 1             Levothyroxine synthroid 50 mcg 1             Lisinopril prinivil/zestril 2.5mg         1     Polyethylene glycol miralax As needed             Quetiapine 100mg seroquel         1     Quetiapine 25mg seroquel 1       1     Ropinirole requip 1mg 1 1 1         Simvastatin zocor 20mg         1     Sumatriptan imitrex 25mg As needed                                                   14 Review of systems  are negative except for       Patient Active Problem List   Diagnosis     Allergic rhinitis      Anxiety     Conversion disorder     Depression     Essential hypertension     Generalized anxiety disorder     H/O bone density study     HTN (hypertension), benign     Hyperlipidemia     Hyperplastic polyps of stomach     Hypothyroidism     Lumbago     Migraine     Osteoarthrosis     Parkinson disease (H)     Parkinson's disease (H)     Parotid mass     PD (Parkinson's disease) (H)     Pyelonephritis     Prediabetes     S/P colonoscopy     Warthin's tumor               Allergies   Allergen Reactions     Ciprofloxacin GI Disturbance     Sulfa Drugs GI Disturbance       Past Surgical History          Past Surgical History:   Procedure Laterality Date     GYN SURGERY         hysterectomy          Past Medical History         Past Medical History:   Diagnosis Date     Cancer (H)       breast          Social History    Social History            Social History     Marital status: Unknown       Spouse name: N/A     Number of children: N/A     Years of education: N/A          Occupational History     Not on file.           Social History Main Topics     Smoking status: Never Smoker     Smokeless tobacco: Never Used     Alcohol use No     Drug use: No     Sexual activity: Not on file           Other Topics Concern     Not on file      Social History Narrative          Family History    No family history on file.      Current Outpatient Prescriptions           Current Outpatient Prescriptions   Medication Sig Dispense Refill     acetaminophen (TYLENOL) 500 MG tablet Take 500 mg by mouth         carbidopa-levodopa (SINEMET CR)  MG per CR tablet Take 1 tablet by mouth         lisinopril (PRINIVIL/ZESTRIL) 2.5 MG tablet Take 2.5 mg by mouth         polyethylene glycol (MIRALAX/GLYCOLAX) Packet Take 17 g by mouth         QUEtiapine (SEROQUEL) 100 MG tablet Take 100 mg by mouth         QUEtiapine (SEROQUEL) 25 MG tablet Take 1 tabs in the morning and 1 tab at bedtime. (takes with a 100 mg tab at hs). DOSE INCREASE.          rOPINIRole (REQUIP) 1 MG tablet Take 1 mg by mouth         SUMAtriptan (IMITREX) 25 MG tablet Take 25 mg by mouth         levothyroxine (SYNTHROID) 50 MCG tablet Take 50 mcg by mouth daily.         aspirin (ASPIRIN LOW DOSE) 81 MG tablet Take 1 tablet by mouth daily.         Cholecalciferol (VITAMIN D) 2000 UNIT CAPS Take  by mouth.         simvastatin (ZOCOR) 20 MG tablet Take 20 mg by mouth At Bedtime.         citalopram (CELEXA) 40 MG tablet Take 40 mg by mouth daily.             Examination  B/P: Data Unavailable, T: Data Unavailable, P: Data Unavailable, R: Data Unavailable 0 lbs 0 oz  There were no vitals taken for this visit., There is no height or weight on file to calculate BMI.   Vitals signs were added and reviewed if not above. Please refer to the chart from this visit.     General examination: well developed, nourished and normal affect  Carotid: No bruits. Chest CTA, Heart regular without gallops or murmurs. Abdomen soft nontender, no masses, bowel sounds intact. Periphery: normal pulses without edema. No skin lesions. MENTAL STATUS:  Alert, oriented x - unable to state the month but can state the day of week and year.  Speech fluent with normal naming, repetition, comprehension.  Good right-left orientation, Can remember 2/3 objects. Able to spell world backwards.   CRANIAL NERVES:  Disks flat. Pupils are equal, round, reactive to light.  Normal vascularity and fields. Extraocular movements full.  Facial sensation and movement normal.  Hearing intact. Palate moves symmetrically.  Tongue midline.  Sternocleidomastoid and trapezius strength intact.  Neck strength was normal.  NEUROLOGIC:  Tone: slight increased tone. Motor in upper and lower extremities. 5/5.  Reflexes 2/4.  Toe signs downgoing.  Good finger-nose-finger, fine finger movement, heel-shin maneuver, sensation to light touch, position sense and vibration and temperature was normal. Gait normal. Romberg and postural stability intact. Tremor   Bilateral resting tremor.      Progress Notes  - in this encounter    LitzyLibrado,  - 2017 1:33 PM CST      NAME: TRUPTI HICKS MR#: [id number]  CSN: [id number]  AUTHENTICATING CLINICIAN: Papi DO Litzy  CONFIRM #: [id number]  LOC: [id number]    CLINIC PROGRESS NOTE    DATE OF VISIT: 2017  : 1945    Mrs. Hicks returns for followup. She has Parkinson's disease. Her big issue for the day is the fact that she has had her driving privileges curtailed. This is on the strength of an OT evaluation for which she did not do well.     She has abided by this decision, but is quite unhappy.     From a Parkinson's point of view, she is doing pretty well. Speech is normal. No problems with drooling. No particular problems using eating utensils or taking care of her other activities of daily living. Has not had any falls.     She does have anxiety and is working with Psychiatry. For this she takes Seroquel 100 mg at night and 25 mg twice during the day.     She is also on Celexa 40 mg a day.     She is on carbidopa/levodopa immediate release 25/100 size tablets 2 tablets at 7 a.m., 11 a.m., 3 p.m., and 8 p.m. She is also on ropinirole 1 mg 3 times a day.     There have been no hallucinations.     She is somewhat slowed in her movements, but easy to understand in her speech. Extraocular movements are full. Face moves symmetrically. Gait ignition, posture actually pretty good. Turns are adequate, showing no postural instability.    IMPRESSION:  Parkinson's disease. Reasonably well medicated and enjoying fairly normal movements. I will leave her Parkinson's medicines the same.     The big issue for the day is that of her restricted driving. There were abnormalities on her pre-driving OT assessment which would suggest significant risk. I cannot sign off on her driving. She would like to contest this. The way forward is a formal driving evaluation. Only if she passed with reasonable facility would I consider  allowing her to drive again. This was discussed in no uncertain terms. I will discuss this with Dr. Villaseñor. I will see her back in followup in 4 months' time.     TT: 30. CT: 25.    CARMELO:MICHAEL C:   D:17 13:33 T: 17 13:58 CONFIRM #: [id number]       Progress Notes  - in this encounter    Librado Mcghee DO - 2012 11:41 AM CST  Formatting of this note may be different from the original.  Progress Notes signed by Librado Mcghee DO at 12 1200   Author: Librado Mcghee DO Service: (none) Author Type: Physician   Filed: 12 1200 Note Time: 12 1141 Status: Signed   : Librado Mcghee DO (Physician)       NAME: TRUPTI HICKS MR#: [id number]  CSN: [id number]  AUTHENTICATING CLINICIAN: Librado Mcghee DO  CONFIRM #: [id number]  LOC: [id number]    CLINIC PROGRESS NOTE    DATE OF VISIT: 2012  : 1945    I am seeing Mrs. Hicks at the request of Dr. Villaseñor. Patient has history of tremor. She is felt to have a postural tremor, probably essential tremor of her outstretched hand. This was pointed out to her perhaps 6 or 7 months ago, she really did not notice it. But since that time, she has noticed it, usually when she is doing something with her hand. Does not freeze, festinate or fall. She drags her left foot on occasion.    PAST MEDICAL HISTORY:  Has a history of constipation for number of years. Also treated for depression/anxiety. No hallucinations. No cognitive changes. No sleep disturbance that she knows of. She is treated for hypertension, hyperlipidemia, hypothyroidism. History of migraine.    MEDICATIONS:  Normally takes p.r.n. alprazolam, aspirin a day. Otherwise on Celexa 40 mg a day. Zocor, Synthroid, p.r.n. Imitrex.    ALLERGIC:  Sulfa, ciprofloxacin.    SOCIAL HISTORY:  Nonsmoker. . Retired teacher.    PHYSICAL EXAMINATION:  She has a decreased blink frequency but reasonably normal voice volume. She has a high-frequency, subtle low amplitude tremor of her outstretched hand on  the left, maybe a hint of such on the right, but it is fairly asymmetric. She does have a rest tremor of her left hand which is different amplitude, different frequency, being higher amplitude and slower frequency. Finger taps show some mild impairment as the longer she goes the smaller the amplitude becomes and the slower is the tap right. Same is true for rapid finger flexion extension, rapid alternating supination and pronation. She has a definite rigidity on the left with reinforcement maneuvers. Finger taps and tone are normal on the right. She has decreased heel taps on the left versus the right. Able to cross her arms over   chest and stand without difficulty. Initiates her gait without freezing or festinating. Stride length is good, but she has decreased arm swing on the left with re-emergent tremor of her left hand which is the slower frequency, higher amplitude tremor previously described. Gross power is normal throughout. Cranial nerves are intact. No tremor of her chin noted. Gross sensory exam is intact. Reflexes symmetric for biceps, triceps, brachioradialis, knee and ankle jerks. Toes are downgoing. Finger-nose-finger is intact.    ASSESSMENT:  Movement disorder with akinetic rigid features. She has actually 2 kinds of tremor. She has a high-frequency low-amplitude tremor which is more essential tremor like, but I think the more worrisome part of her exam is rest tremor that I am noticing which is subtle and intermittent but definitely present as well as bradykinesia and rigidity. This bespeaks to a Parkinson's-like state.     I discussed this with her. Naturally she is sad about the way our discussion went, but I gave her reason for not pushing the panic button and allowing me to image her head with an MRI and get a full evaluation through [facility] with PT, OT, speech, family, and Nursing Services. I will then discuss medicines with her when I see her in followup. I do not think  "that she needs the medicines from a safety and perhaps not from a functional point of view at this point in time.     Will discuss this with primary care.      ______________________________________      Patient was asked about 14 Review of systems including changes in vision (dry eyes, double vision), hearing, heart, lungs, musculoskeletal, depression, anxiety, snoring, RBD, insomnia, urinary frequency, urinary urgency, constipation, swallowing problems, hematological, ID, allergies, skin problems: seborrhea, endocrinological: thyroid, diabetes, cholesterol; balance, weight changes, and other neurological problems and these were not significant at this time except for   Patient Active Problem List   Diagnosis     Allergic rhinitis     Anxiety     Conversion disorder     Depression     Essential hypertension     Generalized anxiety disorder     H/O bone density study     HTN (hypertension), benign     Hyperlipidemia     Hyperplastic polyps of stomach     Hypothyroidism     Lumbago     Migraine     Osteoarthrosis     Parkinson disease (H)     Parkinson's disease (H)     Parotid mass     PD (Parkinson's disease) (H)     Pyelonephritis     Prediabetes     S/P colonoscopy     Warthin's tumor     Mild cognitive impairment          Allergies   Allergen Reactions     Ciprofloxacin GI Disturbance     Sulfa Drugs GI Disturbance     Xanax [Alprazolam] Other (See Comments)     \"zombie-like\" and confused     Past Surgical History:   Procedure Laterality Date     ARTHROSCOPY SHOULDER       EYE SURGERY Right     scheduled right cataract 1st may 2018     EYE SURGERY Left     scheduled left cataract may 2018     GYN SURGERY      hysterectomy - Partial?     HEMORRHOIDECTOMY       MASTECTOMY Right     breast cancer  2000 or so     PAROTIDECTOMY Left     [place] tumor 2012     TONSILLECTOMY      3 yrs of age     Past Medical History:   Diagnosis Date     Cancer (H)     breast     Social History     Social History     Marital status: "      Spouse name: N/A     Number of children: N/A     Years of education: N/A     Occupational History     Not on file.     Social History Main Topics     Smoking status: Never Smoker     Smokeless tobacco: Never Used     Alcohol use No     Drug use: No     Sexual activity: No     Other Topics Concern     Parent/Sibling W/ Cabg, Mi Or Angioplasty Before 65f 55m? No     Social History Narrative    lives in [facility]    Ibis Bethea is daughter       Drug and lactation database from the [place] [employer] of Medicine:  http://toxnet.nlm.nih.gov/cgi-bin/sis/htmlgen?LACT      B/P: Data Unavailable, T: Data Unavailable, P: Data Unavailable, R: Data Unavailable 0 lbs 0 oz  not currently breastfeeding., There is no height or weight on file to calculate BMI.  Medications and Vitals not listed above were documented in the cart and reviewed by me.     Current Outpatient Prescriptions   Medication Sig Dispense Refill     acetaminophen (TYLENOL) 500 MG tablet Take 1 tablet (500 mg) by mouth every 4 hours as needed for mild pain       aspirin (ASPIRIN LOW DOSE) 81 MG tablet 81mg tablet @ 7am 30 tablet      carbidopa-levodopa (SINEMET)  MG per tablet 2 tabs @ 7am, 11am, 3pm and 8pm 720 tablet 3     Cholecalciferol (VITAMIN D) 2000 UNITS CAPS 2000 units @ 7am 30 capsule      citalopram (CELEXA) 40 MG tablet 40 mg tablet by mouth @ 10pm 90 tablet      levothyroxine (SYNTHROID) 50 MCG tablet 50mcg tablet by mouth @ 7am 90 tablet 11     lisinopril (PRINIVIL/ZESTRIL) 2.5 MG tablet 2.5mg tablet by mouth @ 10pm 90 tablet 3     polyethylene glycol (MIRALAX/GLYCOLAX) Packet Take 17 g by mouth daily as needed for constipation 7 packet      QUEtiapine (SEROQUEL) 100 MG tablet 100mg tablet @ 10pm; takes with a 25mg tablet 60 tablet 11     QUEtiapine (SEROQUEL) 25 MG tablet 1 tab @ 7am 1 tablet @ 10pm (along with 100mg at bedtime) 60 tablet      rivastigmine (EXELON) 13.3 MG/24HR 24 hr patch Place 1 patch onto  the skin daily 30 patch 11     rOPINIRole (REQUIP) 1 MG tablet 1mg tablet by mouth @ 7am, 11am and 3pm 270 tablet 3     simvastatin (ZOCOR) 20 MG tablet 20mg tablet by mouth @ 10pm 30 tablet      SUMAtriptan (IMITREX) 25 MG tablet Take 1 tablet (25 mg) by mouth at onset of headache for migraine 30 tablet          Keith Styles MD  Answers for HPI/ROS submitted by the patient on 9/18/2018   General Symptoms: No  Skin Symptoms: No  HENT Symptoms: Yes  EYE SYMPTOMS: No  HEART SYMPTOMS: No  LUNG SYMPTOMS: No  INTESTINAL SYMPTOMS: No  URINARY SYMPTOMS: No  GYNECOLOGIC SYMPTOMS: No  BREAST SYMPTOMS: No  SKELETAL SYMPTOMS: No  BLOOD SYMPTOMS: No  NERVOUS SYSTEM SYMPTOMS: No  MENTAL HEALTH SYMPTOMS: No  Ear pain: No  Ear discharge: No  Hearing loss: No  Tinnitus: No  Nosebleeds: No  Congestion: No  Sinus pain: No  Trouble swallowing: No   Voice hoarseness: No  Mouth sores: No  Sore throat: No  Tooth pain: No  Gum tenderness: No  Bleeding gums: No  Change in taste: No  Change in sense of smell: No  Dry mouth: No  Hearing aid used: No  Neck lump: No

## 2018-09-18 ENCOUNTER — OFFICE VISIT (OUTPATIENT)
Dept: PHARMACY | Facility: CLINIC | Age: 73
End: 2018-09-18
Payer: COMMERCIAL

## 2018-09-18 ENCOUNTER — OFFICE VISIT (OUTPATIENT)
Dept: NEUROLOGY | Facility: CLINIC | Age: 73
End: 2018-09-18
Payer: COMMERCIAL

## 2018-09-18 VITALS
DIASTOLIC BLOOD PRESSURE: 80 MMHG | HEART RATE: 85 BPM | HEIGHT: 64 IN | OXYGEN SATURATION: 98 % | SYSTOLIC BLOOD PRESSURE: 135 MMHG | WEIGHT: 134.4 LBS | BODY MASS INDEX: 22.94 KG/M2

## 2018-09-18 DIAGNOSIS — G20.C PARKINSONISM, UNSPECIFIED PARKINSONISM TYPE (H): ICD-10-CM

## 2018-09-18 DIAGNOSIS — G20.A1 PARKINSON DISEASE (H): Primary | ICD-10-CM

## 2018-09-18 DIAGNOSIS — G20.A1 PARKINSON'S DISEASE (H): Primary | ICD-10-CM

## 2018-09-18 PROCEDURE — 99606 MTMS BY PHARM EST 15 MIN: CPT | Performed by: PHARMACIST

## 2018-09-18 RX ORDER — QUETIAPINE FUMARATE 25 MG/1
TABLET, FILM COATED ORAL
Qty: 270 TABLET | Refills: 3 | Status: SHIPPED | OUTPATIENT
Start: 2018-09-18 | End: 2019-03-01

## 2018-09-18 RX ORDER — MEMANTINE HYDROCHLORIDE 5 MG/1
TABLET ORAL
Qty: 120 TABLET | Refills: 11 | Status: SHIPPED | OUTPATIENT
Start: 2018-09-18 | End: 2018-11-13

## 2018-09-18 RX ORDER — CARBIDOPA AND LEVODOPA 25; 100 MG/1; MG/1
TABLET ORAL
Qty: 810 TABLET | Refills: 3 | Status: SHIPPED | OUTPATIENT
Start: 2018-09-18 | End: 2018-11-13

## 2018-09-18 ASSESSMENT — ENCOUNTER SYMPTOMS
NECK MASS: 0
SORE THROAT: 0
TASTE DISTURBANCE: 0
HOARSE VOICE: 0
TROUBLE SWALLOWING: 0
SINUS CONGESTION: 0
SINUS PAIN: 0
SMELL DISTURBANCE: 0

## 2018-09-18 ASSESSMENT — PAIN SCALES - GENERAL: PAINLEVEL: NO PAIN (0)

## 2018-09-18 NOTE — PATIENT INSTRUCTIONS
: 1945    VLADIMIR: 2018    Parkinson    Rosalva O'Alexis    Patch has been working very well at the highest dose.   Things has gotten recently.     Constipation - she is taking something for this  She is taking something from cub called runs smoothly - tea bag and drinking more water.   She has good success     Bladder control is okay    No falls.   [place] exercise program 3-4 times per week for one week  Lives in [facility].   Using [employer] mobility  Rosalva daughter is not working and  - at [facility] with older   She is at home today       Medications     7am 11am 3pm 8pm 10pm      Acetaminophen 500mg prn                  Aspirin 81mg 1                  Carbidopa/levodopa 25/100 Sinemet  2 2 2 2         Cholecalciferol vitamin d 2000 units 1                  Citalopram celexa 40mg 1                  Cyanocobalamin vitamin B12 1000mcg tablets         Levothyroxine synthroid 50 mcg 1                  Lisinopril prinivil/zestril 2.5mg             1      nonformulary runs smoothly tea from cub     1    Polyethylene glycol miralax As needed                  Quetiapine 100mg seroquel             1      Quetiapine 25mg seroquel 1          1      Rivastigmine exelon 13.3mg/24 hr patch 1        Ropinirole requip 1mg 1 1 1            Simvastatin zocor 20mg             1      Sumatriptan imitrex 25mg As needed                                                                          History obtained from patient     Having more problems with cognition  Started on b12 as was normal but on the low end of normal.     She has more left hand tremor when she is anxious.     PLAN  Dose increase in the seroquel at night to 150mg == 100mg + 2 x 25mg    Consider adding folic acid (folate) and vitamin b6 (pyridoxine) over the counter.     Return back in 3 months to see Alyssa Santos NP    Discussed using mindfulness or other strategies to calm the tremor   Will add an additional sinemet to be taken as needed for  stressful related increased tremor.         Medications     7am 11am 3pm 8pm 10pm      Acetaminophen 500mg prn                  Aspirin 81mg 1                  Carbidopa/levodopa 25/100 Sinemet  2 2 2 2         Cholecalciferol vitamin d 2000 units 1                  Citalopram celexa 40mg 1                  Cyanocobalamin vitamin B12 1000mcg tablets 1        Levothyroxine synthroid 50 mcg 1                  Lisinopril prinivil/zestril 2.5mg             1      nonformulary runs smoothly tea from cub     1    Polyethylene glycol miralax As needed                  Quetiapine 100mg seroquel             1      Quetiapine 25mg seroquel 1          2      Rivastigmine exelon 13.3mg/24 hr patch 1        Ropinirole requip 1mg 1 1 1            Simvastatin zocor 20mg             1      Sumatriptan imitrex 25mg As needed

## 2018-09-18 NOTE — Clinical Note
CATHI - met with her briefly to discuss Part D plans and medication costs. It sounds like the Exelon patch has been helpful

## 2018-09-18 NOTE — MR AVS SNAPSHOT
After Visit Summary   2018    Paige Mercado    MRN: [id number]           Patient Information     Date Of Birth          1945        Visit Information        Provider Department      2018 8:40 AM Keith Styles MD [facility]        Today's Diagnoses     Parkinson's disease (H)    -  1    Parkinsonism, unspecified Parkinsonism type (H)          Care Instructions    : 1945    VLADIMIR: 2018    Parkinson    Rosalva O'[place]mont    Patch has been working very well at the highest dose.   Things has gotten recently.     Constipation - she is taking something for this  She is taking something from cub called runs smoothly - tea bag and drinking more water.   She has good success     Bladder control is okay    No falls.   [place] exercise program 3-4 times per week for one week  Lives in [facility].   Using [employer] mobility  Rosalva daughter is not working and  - at [facility] with older   She is at home today       Medications     7am 11am 3pm 8pm 10pm      Acetaminophen 500mg prn                  Aspirin 81mg 1                  Carbidopa/levodopa 25/100 Sinemet  2 2 2 2         Cholecalciferol vitamin d 2000 units 1                  Citalopram celexa 40mg 1                  Cyanocobalamin vitamin B12 1000mcg tablets         Levothyroxine synthroid 50 mcg 1                  Lisinopril prinivil/zestril 2.5mg             1      nonformulary runs smoothly tea from cub     1    Polyethylene glycol miralax As needed                  Quetiapine 100mg seroquel             1      Quetiapine 25mg seroquel 1          1      Rivastigmine exelon 13.3mg/24 hr patch 1        Ropinirole requip 1mg 1 1 1            Simvastatin zocor 20mg             1      Sumatriptan imitrex 25mg As needed                                                                          History obtained from patient     Having more problems with cognition  Started on b12 as was normal but on the low end of  normal.     She has more left hand tremor when she is anxious.     PLAN  Dose increase in the seroquel at night to 150mg == 100mg + 2 x 25mg    Consider adding folic acid (folate) and vitamin b6 (pyridoxine) over the counter.     Return back in 3 months to see Alyssa Santos NP    Discussed using mindfulness or other strategies to calm the tremor   Will add an additional sinemet to be taken as needed for stressful related increased tremor.         Medications     7am 11am 3pm 8pm 10pm      Acetaminophen 500mg prn                  Aspirin 81mg 1                  Carbidopa/levodopa 25/100 Sinemet  2 2 2 2         Cholecalciferol vitamin d 2000 units 1                  Citalopram celexa 40mg 1                  Cyanocobalamin vitamin B12 1000mcg tablets 1        Levothyroxine synthroid 50 mcg 1                  Lisinopril prinivil/zestril 2.5mg             1      nonformulary runs smoothly tea from cub     1    Polyethylene glycol miralax As needed                  Quetiapine 100mg seroquel             1      Quetiapine 25mg seroquel 1          2      Rivastigmine exelon 13.3mg/24 hr patch 1        Ropinirole requip 1mg 1 1 1            Simvastatin zocor 20mg             1      Sumatriptan imitrex 25mg As needed                                                                            Follow-ups after your visit        Follow-up notes from your care team     Return in about 3 months (around 12/18/2018).      Your next 10 appointments already scheduled     Dec 19, 2018  1:15 PM CST   (Arrive by 1:00 PM)   Return Movement Disorder with ED Balderrama CNP   [facility] ([facility])    [address] [place] [id number] [phone]              Who to contact     Please call your clinic at [phone] to:    Ask questions about your health    Make or cancel appointments    Discuss your medicines    Learn about your test results    Speak to your doctor  "           Additional Information About Your Visit        [email or web address] Information     [employer] gives you secure access to your electronic health record. If you see a primary care provider, you can also send messages to your care team and make appointments. If you have questions, please call your primary care clinic.  If you do not have a primary care provider, please call [phone] and they will assist you.      [employer] is an electronic gateway that provides easy, online access to your medical records. With [employer], you can request a clinic appointment, read your test results, renew a prescription or communicate with your care team.     To access your existing account, please contact your [facility] or call [phone] for assistance.        Care EveryWhere ID     This is your Care EveryWhere ID. This could be used by other organizations to access your [place] medical records  [id number]        Your Vitals Were     Pulse Height Pulse Oximetry Breastfeeding? BMI (Body Mass Index)       85 1.613 m (5' 3.5\") 98% No 23.43 kg/m2        Blood Pressure from Last 3 Encounters:   09/18/18 135/80   06/18/18 136/80   03/22/18 138/76    Weight from Last 3 Encounters:   09/18/18 61 kg (134 lb 6.4 oz)   06/18/18 61.1 kg (134 lb 12.8 oz)   03/22/18 66.7 kg (147 lb)              Today, you had the following     No orders found for display         Today's Medication Changes          These changes are accurate as of 9/18/18  9:21 AM.  If you have any questions, ask your nurse or doctor.               Start taking these medicines.        Dose/Directions    memantine 5 MG tablet   Commonly known as:  NAMENDA   Used for:  Parkinson's disease (H)   Started by:  Keith Styles MD        5mg/daily x 1 wk; then 5mg twice daily x1wk then 5mg-10mg x 1wk; then 10mg by mouth twice daily   Quantity:  120 tablet   Refills:  11         These medicines have changed or have updated prescriptions.        " Dose/Directions    carbidopa-levodopa  MG per tablet   Commonly known as:  SINEMET   This may have changed:  additional instructions   Used for:  Parkinsonism, unspecified Parkinsonism type (H)   Changed by:  Keith Styles MD        2 tabs @ 7am, 11am, 3pm and 8pm and 1 tab as needed   Quantity:  810 tablet   Refills:  3       cyanocobalamin 1000 MCG Tabs   This may have changed:    - how much to take  - how to take this  - additional instructions   Changed by:  Keith Styles MD        1000mcg dissolvable tab by mouth daily @ 7am   Quantity:  30 tablet   Refills:  0       * QUEtiapine 100 MG tablet   Commonly known as:  SEROquel   This may have changed:  Another medication with the same name was changed. Make sure you understand how and when to take each.   Changed by:  Keith Styles MD        100mg tablet @ 10pm; takes with a 25mg tablet   Quantity:  60 tablet   Refills:  11       * QUEtiapine 25 MG tablet   Commonly known as:  SEROquel   This may have changed:  additional instructions   Used for:  Parkinson's disease (H)   Changed by:  Keith Styles MD        1 tab @ 7am and 2 tabs @ 10pm (along with 100mg at bedtime)   Quantity:  270 tablet   Refills:  3       * Notice:  This list has 2 medication(s) that are the same as other medications prescribed for you. Read the directions carefully, and ask your doctor or other care provider to review them with you.         Where to get your medicines      These medications were sent to [id number] - [place], [place] - [address] AT [facility] Antonella GAMEZ  [address] [facility] [address]     Phone:  [phone]     carbidopa-levodopa  MG per tablet    QUEtiapine 25 MG tablet         Some of these will need a paper prescription and others can be bought over the counter.  Ask your nurse if you have questions.     Bring a paper prescription for each of these medications     memantine 5 MG  tablet                Primary Care Provider Office Phone # Fax #    [facility] [phone]       [address] [place] [id number]        Equal Access to Services     FERNANDA GARLAND : Hadjosh everett ku miriamo Sosusanneali, waaxda luqadaha, qaybta kaalmada adelogan, valerie pintosidra lange. So [facility] [phone].    ATENCIÓN: Si habla español, tiene a eller disposición servicios gratuitos de asistencia lingüística. Llame al [phone].    We comply with applicable federal civil rights laws and [place] laws. We do not discriminate on the basis of race, color, national origin, age, disability, sex, sexual orientation, or gender identity.            Thank you!     Thank you for choosing [facility]  for your care. Our goal is always to provide you with excellent care. Hearing back from our patients is one way we can continue to improve our services. Please take a few minutes to complete the written survey that you may receive in the mail after your visit with us. Thank you!             Your Updated Medication List - Protect others around you: Learn how to safely use, store and throw away your medicines at www.disposemymeds.org.          This list is accurate as of 9/18/18  9:21 AM.  Always use your most recent med list.                   Brand Name Dispense Instructions for use Diagnosis    acetaminophen 500 MG tablet    TYLENOL     Take 1 tablet (500 mg) by mouth every 4 hours as needed for mild pain        ASPIRIN LOW DOSE 81 MG tablet   Generic drug:  aspirin     30 tablet    81mg tablet @ 7am        carbidopa-levodopa  MG per tablet    SINEMET    810 tablet    2 tabs @ 7am, 11am, 3pm and 8pm and 1 tab as needed    Parkinsonism, unspecified Parkinsonism type (H)       celeXA 40 MG tablet   Generic drug:  citalopram     90 tablet    40 mg tablet by mouth @ 10pm        cyanocobalamin 1000 MCG Tabs     30 tablet    1000mcg dissolvable tab by mouth daily @ 7am         lisinopril 2.5 MG tablet    PRINIVIL/Zestril    90 tablet    2.5mg tablet by mouth @ 10pm        memantine 5 MG tablet    NAMENDA    120 tablet    5mg/daily x 1 wk; then 5mg twice daily x1wk then 5mg-10mg x 1wk; then 10mg by mouth twice daily    Parkinson's disease (H)       NONFORMULARY      Runs smoothly tea from cub between 9-10pm        polyethylene glycol Packet    MIRALAX/GLYCOLAX    7 packet    Take 17 g by mouth daily as needed for constipation        * QUEtiapine 100 MG tablet    SEROquel    60 tablet    100mg tablet @ 10pm; takes with a 25mg tablet        * QUEtiapine 25 MG tablet    SEROquel    270 tablet    1 tab @ 7am and 2 tabs @ 10pm (along with 100mg at bedtime)    Parkinson's disease (H)       rivastigmine 13.3 MG/24HR 24 hr patch    EXELON    30 patch    Place 1 patch onto the skin daily    Memory problem, Parkinson's disease (H)       rOPINIRole 1 MG tablet    REQUIP    270 tablet    1mg tablet by mouth @ 7am, 11am and 3pm        simvastatin 20 MG tablet    ZOCOR    30 tablet    20mg tablet by mouth @ 10pm        SUMAtriptan 25 MG tablet    IMITREX    30 tablet    Take 1 tablet (25 mg) by mouth at onset of headache for migraine        SYNTHROID 50 MCG tablet   Generic drug:  levothyroxine     90 tablet    50mcg tablet by mouth @ 7am        vitamin D 2000 units Caps     30 capsule    2000 units @ 7am        * Notice:  This list has 2 medication(s) that are the same as other medications prescribed for you. Read the directions carefully, and ask your doctor or other care provider to review them with you.

## 2018-09-18 NOTE — MR AVS SNAPSHOT
After Visit Summary   9/18/2018    Paige Mercado    MRN: [id number]           Patient Information     Date Of Birth          1945        Visit Information        Provider Department      9/18/2018 10:00 AM Zainab Sanches RPH [facility]        Today's Diagnoses     Parkinson disease (H)    -  1       Follow-ups after your visit        Your next 10 appointments already scheduled     Dec 19, 2018  1:15 PM CST   (Arrive by 1:00 PM)   Return Movement Disorder with ED Balderrama [facility] ([facility])    [address] [place] [address]  3rd [facility] [address] [phone]              Who to contact     If you have questions or need follow up information about today's clinic visit or your schedule please contact [facility] NEUROLOGY CLINIC [facility] directly at [phone].  Normal or non-critical lab and imaging results will be communicated to you by MyChart, letter or phone within 4 business days after the clinic has received the results. If you do not hear from us within 7 days, please contact the clinic through [employer] or phone. If you have a critical or abnormal lab result, we will notify you by phone as soon as possible.  Submit refill requests through [employer] or call your pharmacy and they will forward the refill request to us. Please allow 3 business days for your refill to be completed.          Additional Information About Your Visit        MyChart Information     [employer] gives you secure access to your electronic health record. If you see a primary care provider, you can also send messages to your care team and make appointments. If you have questions, please call your primary care clinic.  If you do not have a primary care provider, please call [phone] and they will assist you.        Care EveryWhere ID     This is your Care EveryWhere ID. This could be used by other organizations to access your [facility]  records  [id number]         Blood Pressure from Last 3 Encounters:   09/18/18 135/80   06/18/18 136/80   03/22/18 138/76    Weight from Last 3 Encounters:   09/18/18 134 lb 6.4 oz (61 kg)   06/18/18 134 lb 12.8 oz (61.1 kg)   03/22/18 147 lb (66.7 kg)              Today, you had the following     No orders found for display         Today's Medication Changes          These changes are accurate as of 9/18/18 12:13 PM.  If you have any questions, ask your nurse or doctor.               Start taking these medicines.        Dose/Directions    memantine 5 MG tablet   Commonly known as:  NAMENDA   Used for:  Parkinson's disease (H)   Started by:  Keith Styles MD        5mg/daily x 1 wk; then 5mg twice daily x1wk then 5mg-10mg x 1wk; then 10mg by mouth twice daily   Quantity:  120 tablet   Refills:  11         These medicines have changed or have updated prescriptions.        Dose/Directions    carbidopa-levodopa  MG per tablet   Commonly known as:  SINEMET   This may have changed:  additional instructions   Used for:  Parkinsonism, unspecified Parkinsonism type (H)   Changed by:  Keith Styles MD        2 tabs @ 7am, 11am, 3pm and 8pm and 1 tab as needed   Quantity:  810 tablet   Refills:  3       cyanocobalamin 1000 MCG Tabs   This may have changed:    - how much to take  - how to take this  - additional instructions   Changed by:  Keith Styles MD        1000mcg dissolvable tab by mouth daily @ 7am   Quantity:  30 tablet   Refills:  0       * QUEtiapine 100 MG tablet   Commonly known as:  SEROquel   This may have changed:  Another medication with the same name was changed. Make sure you understand how and when to take each.   Changed by:  Keith Styles MD        100mg tablet @ 10pm; takes with a 25mg tablet   Quantity:  60 tablet   Refills:  11       * QUEtiapine 25 MG tablet   Commonly known as:  SEROquel   This may have changed:  additional instructions   Used for:  Parkinson's disease  (H)   Changed by:  Keith Styles MD        1 tab @ 7am and 2 tabs @ 10pm (along with 100mg at bedtime)   Quantity:  270 tablet   Refills:  3       * Notice:  This list has 2 medication(s) that are the same as other medications prescribed for you. Read the directions carefully, and ask your doctor or other care provider to review them with you.         Where to get your medicines      These medications were sent to "[facility] [id number] [place] [address] [facility]  [address], [facility] [address]     Phone:  [phone]     carbidopa-levodopa  MG per tablet    QUEtiapine 25 MG tablet         Some of these will need a paper prescription and others can be bought over the counter.  Ask your nurse if you have questions.     Bring a paper prescription for each of these medications     memantine 5 MG tablet                Primary Care Provider Office Phone # Fax #    [facility] [phone]       [address] [place] [address]        Equal Access to Services     [facility] : Hadii aad ku hadasho Soomaali, waaxda luqadaha, qaybta kaalmada adeegyarosalia, valerie lange. So [facility] [phone].    ATENCIÓN: Si habla español, tiene a eller disposición servicios gratuitos de asistencia lingüística. Jerilyn al [phone].    We comply with applicable federal civil rights laws and [place] laws. We do not discriminate on the basis of race, color, national origin, age, disability, sex, sexual orientation, or gender identity.            Thank you!     Thank you for choosing [facility]UROLOG[facility] CLIN[facility] [place] [facility]  for your care. Our goal is always to provide you with excellent care. Hearing back from our patients is one way we can continue to improve our services. Please take a few minutes to complete the written survey that you may receive in the mail after your visit with us. Thank you!              Your Updated Medication List - Protect others around you: Learn how to safely use, store and throw away your medicines at www.disposemymeds.org.          This list is accurate as of 9/18/18 12:13 PM.  Always use your most recent med list.                   Brand Name Dispense Instructions for use Diagnosis    acetaminophen 500 MG tablet    TYLENOL     Take 1 tablet (500 mg) by mouth every 4 hours as needed for mild pain        ASPIRIN LOW DOSE 81 MG tablet   Generic drug:  aspirin     30 tablet    81mg tablet @ 7am        carbidopa-levodopa  MG per tablet    SINEMET    810 tablet    2 tabs @ 7am, 11am, 3pm and 8pm and 1 tab as needed    Parkinsonism, unspecified Parkinsonism type (H)       celeXA 40 MG tablet   Generic drug:  citalopram     90 tablet    40 mg tablet by mouth @ 10pm        cyanocobalamin 1000 MCG Tabs     30 tablet    1000mcg dissolvable tab by mouth daily @ 7am        lisinopril 2.5 MG tablet    PRINIVIL/Zestril    90 tablet    2.5mg tablet by mouth @ 10pm        memantine 5 MG tablet    NAMENDA    120 tablet    5mg/daily x 1 wk; then 5mg twice daily x1wk then 5mg-10mg x 1wk; then 10mg by mouth twice daily    Parkinson's disease (H)       NONFORMULARY      Runs smoothly tea from cub between 9-10pm        polyethylene glycol Packet    MIRALAX/GLYCOLAX    7 packet    Take 17 g by mouth daily as needed for constipation        * QUEtiapine 100 MG tablet    SEROquel    60 tablet    100mg tablet @ 10pm; takes with a 25mg tablet        * QUEtiapine 25 MG tablet    SEROquel    270 tablet    1 tab @ 7am and 2 tabs @ 10pm (along with 100mg at bedtime)    Parkinson's disease (H)       rivastigmine 13.3 MG/24HR 24 hr patch    EXELON    30 patch    Place 1 patch onto the skin daily    Memory problem, Parkinson's disease (H)       rOPINIRole 1 MG tablet    REQUIP    270 tablet    1mg tablet by mouth @ 7am, 11am and 3pm        simvastatin 20 MG tablet    ZOCOR    30 tablet    20mg tablet by mouth @ 10pm         SUMAtriptan 25 MG tablet    IMITREX    30 tablet    Take 1 tablet (25 mg) by mouth at onset of headache for migraine        SYNTHROID 50 MCG tablet   Generic drug:  levothyroxine     90 tablet    50mcg tablet by mouth @ 7am        vitamin D 2000 units Caps     30 capsule    2000 units @ 7am        * Notice:  This list has 2 medication(s) that are the same as other medications prescribed for you. Read the directions carefully, and ask your doctor or other care provider to review them with you.

## 2018-09-18 NOTE — Clinical Note
9/18/2018       RE: Paige Mercado  [address] [place] [address]     Dear Colleague,    Thank you for referring your patient, Paige Mercado, to the [facility] NEUROLOGY at [facility]. Please see a copy of my visit note below.    No notes on file    Again, thank you for allowing me to participate in the care of your patient.      Sincerely,    Keith Styles MD

## 2018-09-18 NOTE — PROGRESS NOTES
SUBJECTIVE/OBJECTIVE:                Paige Mercado is a 73 year old female coming in for a follow-up visit for Medication Therapy Management.  She was referred to me from Dr. Styles and is seeing him today today.     Chief Complaint: Follow up from our visit on 5/29/18  Tobacco: No tobacco use  Alcohol: Less than 1 beverage / month    Medication Adherence/Access: Patient has been using the [employer] to help cover the cover of rivastigmine patches and she recently received a letter stating that she needs to renew the garth program. Patient also asked about Medicare Part D options that would be best for her given her medication regimen.    Parkinson's Disease:  Current medications include: Ropinirole 1 mg at 7 am, 11 am, and 3 pm and Carbidopa-levodopa  mg 2 tablets at 7 am, 11 am, 3 pm, and 8 pm and 1 tablet as needed. She also takes quetiapine 125 mg nightly and rivastigmine patch 13.3 mg daily. Pt reports that symptoms of PD are improved. She has noticed improvements in her memory and cognition with rivastigmine, but still has difficulty with word-finding. She also notes having some muscle weakness. During her visit with Dr. Styles, she was given a prescription for memantine to take in the future (not now) and quetiapine was increased to 150 mg nightly.    Today's Vitals:   Vital Signs 9/18/2018   Systolic 135   Diastolic 80   Pulse 85       ASSESSMENT:              Current medications were reviewed today as discussed above.      Medication Adherence: excellent, recommended that patient call [employer] Line to discuss Medicare Part D plans and if she keeps the same plan she may need to renew the Assistance Fund for 2019    Parkinson's Disease: Improving; however, Dr. Styles did increase quetiapine today and she may start memantine in the future to help with cognition/memory. It does seem as though the rivastigmine has been beneficial, so patient should determine a plan for insurance coverage/garth  coverage of this medication since it is expensive.     PLAN:                  1. Provided patient with information about [employer].  2. Encouraged patient to fill out the Assistance Fund information and submit for 2019 garth funding.  3. Per Dr. Styles's visit: she will increase quetiapine to 150 mg nightly.     I spent 15 minutes with this patient today. All changes were made via collaborative practice agreement with Dr. Styles. A copy of the visit note was provided to the patient's referring provider.     Will follow up in 3-6 months or sooner if needed.    The patient declined a summary of these recommendations as an after visit summary.    Zainab Sanches, Pharm.D.  Medication Therapy Management Pharmacist  Phone: [phone]

## 2018-09-18 NOTE — LETTER
2018      RE: Paige Mercado   [address] [place] [address]       Diagnosis/Summary/Recommendations:    PATIENT: Paige Mercado  73 year old female     : 1945    VLADIMIR: 2018    Parkinson    Rosalva Bethea    Patch has been working very well at the highest dose.   Things has gotten recently.     Constipation - she is taking something for this  She is taking something from cub called runs smoothly - tea bag and drinking more water.   She has good success     Bladder control is okay    No falls.   [employer] exercise program 3-4 times per week for one week  Lives in [facility].   Using [employer]  Rosalva daughter is not working and  - at [facility] with older   She is at home today       Medications     7am 11am 3pm 8pm 10pm      Acetaminophen 500mg prn                  Aspirin 81mg 1                  Carbidopa/levodopa 25/100 Sinemet  2 2 2 2         Cholecalciferol vitamin d 2000 units 1                  Citalopram celexa 40mg 1                  Cyanocobalamin vitamin B12 1000mcg tablets         Levothyroxine synthroid 50 mcg 1                  Lisinopril prinivil/zestril 2.5mg             1      nonformulary runs smoothly tea from cub     1    Polyethylene glycol miralax As needed                  Quetiapine 100mg seroquel             1      Quetiapine 25mg seroquel 1          1      Rivastigmine exelon 13.3mg/24 hr patch 1        Ropinirole requip 1mg 1 1 1            Simvastatin zocor 20mg             1      Sumatriptan imitrex 25mg As needed                                                                          History obtained from patient     Having more problems with cognition  Started on b12 as was normal but on the low end of normal.     She has more left hand tremor when she is anxious.     PLAN  Dose increase in the seroquel at night to 150mg == 100mg + 2 x 25mg    Consider adding folic acid (folate) and vitamin b6 (pyridoxine) over the counter.     Return back  in 3 months to see Alyssa Santos NP    Discussed using mindfulness or other strategies to calm the tremor   Will add an additional sinemet to be taken as needed for stressful related increased tremor.         Medications     7am 11am 3pm 8pm 10pm      Acetaminophen 500mg prn                  Aspirin 81mg 1                  Carbidopa/levodopa 25/100 Sinemet  2 2 2 2         Cholecalciferol vitamin d 2000 units 1                  Citalopram celexa 40mg 1                  Cyanocobalamin vitamin B12 1000mcg tablets 1        Levothyroxine synthroid 50 mcg 1                  Lisinopril prinivil/zestril 2.5mg             1      nonformulary runs smoothly tea from cub     1    Polyethylene glycol miralax As needed                  Quetiapine 100mg seroquel             1      Quetiapine 25mg seroquel 1          2      Rivastigmine exelon 13.3mg/24 hr patch 1        Ropinirole requip 1mg 1 1 1            Simvastatin zocor 20mg             1      Sumatriptan imitrex 25mg As needed                                                                      Coding statement:   Duration of  Services: patient care and care coordination was 25 minutes  Greater than 50% of this visit was spent in counseling and coordination of care.     Keith Styles MD     ______________________________________    Last visit date and details:      PATIENT: Paige Mercado     : 1945     VLADIMIR: 2018     REASON FOR VISIT: Parkinson's disease (PD) follow up.     HPI: Ms. Paige Mercado is a 72 year old right - handed  female who came to the [facility] neurology clinic accompanied by her friend, who is in the waiting area for a follow up visit.  She was last seen in the clinic on 3/15/2018 by Dr. Styles to establish care for PD.  Since then she has met with Zainab Sanches, PharmD & was started on Rivastigmine patch.     She was diagnosed with PD in Dec 2012.  She noticed that she was falling off the bike a few times that led her to see Dr. Mcghee.   "She has been followed by Dr. Mcghee in [facility].  Due to driving distance she decided to switch providers.  She was seen by Dr. De Los Santos a couple of times.      Tremor is controlled.  She denies wearing off motor symptoms.  She is asking \"can you get rid of my tremor for good.\"  She exercises regularly.  She goes to the [facility] 3 times a week to exercise.      PD Medications 7 am 11 am 3 pm 8 pm 10 pm   Sinemet 25/100 mg  2 2 2 2     Ropinirole 1 mg 1 1 1             She is not sure why she takes Quetiapine.  She denies hallucinations.  She sees a psychiatrist for anxiety.     She is on Rivastigmine.  She reports improvement in her memory.  She plans to stay on taking 9.5 mg dose until the pills are completed.  She has noticed added benefit when she increased her dose from 4.6 to 9.5 mg.  She is interested in increase the dose.  Denies side effects including dizziness, HA, or GI symptoms.      MEDICATIONS:        Medication Sig     acetaminophen (TYLENOL) 500 MG  Take 1 tablet (500 mg) by mouth every 4 hours as needed for mild pain     aspirin (ASPIRIN LOW DOSE) 81 MG  81mg tablet @ 7am     carbidopa-levodopa (SINEMET)  MG 2 tabs @ 7am, 11am, 3pm and 8pm     Cholecalciferol (VITAMIN D) 2000 UNITS 2000 units @ 7am     citalopram (CELEXA) 40 MG tablet 40 mg tablet by mouth @ 10pm     levothyroxine (SYNTHROID) 50 MCG  50mcg tablet by mouth @ 7am     lisinopril (PRINIVIL/ZESTRIL) 2.5 MG  2.5mg tablet by mouth @ 10pm     polyethylene glycol (MIRALAX/GLYCOLAX) Packet Take 17 g by mouth daily as needed for constipation     QUEtiapine (SEROQUEL) 100 MG  100mg tablet @ 10pm; takes with a 25mg tablet     QUEtiapine (SEROQUEL) 25 MG  1 tab @ 7am 1 tablet @ 10pm (along with 100mg at bedtime)     rivastigmine (EXELON) 9.5 MG/24HR 24 hr patch Place 1 patch onto the skin daily     rOPINIRole (REQUIP) 1 MG tablet 1mg tablet by mouth @ 7am, 11am and 3pm     simvastatin (ZOCOR) 20 MG tablet 20mg tablet by mouth @ 10pm " "    SUMAtriptan (IMITREX) 25 MG tablet Take 1 tablet (25 mg) by mouth at onset of headache for migraine         ALLERGIES: Ciprofloxacin; Sulfa drugs; and Xanax [alprazolam]     PHYSICAL EXAM:     VITAL SIGNS:  Blood pressure 136/80, pulse 89, temperature 97.8  F (36.6  C), temperature source Oral, resp. rate 24, height 1.6 m (5' 3\"), weight 61.1 kg (134 lb 12.8 oz), SpO2 99 %.  Body mass index is 23.88 kg/(m^2).     GENERAL:  Ms. Mercado is a pleasant  female who is well-groomed and well-developed sitting comfortably in the exam room without any distress.  Affect is appropriate.     MOVEMENT DISORDERS ASSESSMENT: (Last Sinemet was about 4 hrs ago)  Speech: Slight loss of expression and volume.  Facial Expression: Minimal hypomimia.  Rest Tremor: Slight and infrequently present in Lt hand.  Rare in chin.   Action/Postural Tremor: Slight postural tremor bilaterally; no action tremor.    Rigidity: Slight in all extremities.   Finger Taps: Normal.   Hand opening & closing: Mild slowing bilaterally.   Hand pronation & supination: Mild slowing and reduction in amplitude bilaterally.   Leg Agility: Normal.   Toe Tapping:  Mild slowing and reduction in amplitude bilaterally.   Arising from chair - arms folded across chest: Normal.  Posture: Normal erect.  Gait: Walks slowly, with normal steps. No festination or propulsion.  Postural Stability: Retropulsion, but recovers unaided.  Body Bradykinesia: Minimal slowness.     ASSESSMENT:     1)  Parkinson's Disease:  Patient has a 6 year history of PD.  Motor symptoms are stable.    2)  Memory problems:  Improved on Rivastigmine.      PLAN:     1)  Will stay on the same dose of antiparkinsonian medications.      PD Medications 7 am 11 am 3 pm 8 pm   Sinemet 25/100 mg  2 2 2 2   Ropinirole 1 mg 1 1 1        __  Will continue exercising regularly.      2)  After completing Rivastigmine 9.5 mg patches, will increase dose to 13.3 mg.  If she has trouble with insurance, " she'll call us.   Phone number given.  __  In Epic, it was noted that pt had 2 upcoming appointments at [place] on 7/2.  Pt didn't know anything about it.  She was encouraged to call the clinic & find out.   Pt was called on 6/19/2018.  Apparently her daughter made the appointments.  Pt wasn't able to recall what her daughter told her regarding the appointments & what they were for.   __  At some point, since pt lives by herself, it might be reasonable to have her see OT to insure her safety to live independently.      Will return to our clinic in 3 months or sooner as needed.     The total time spent with the patient was 50 minutes, and greater than 50% of this time was spent in counseling and coordination of care.     ED Gibbons,  CNP  [facility]         Parkinsonism           - discussed to make sure that her sinemet medication should be one hour before protein or two hours             after protein            - may need as needed sinemet for tremor  Mood disorder - anxiety she is relatively well managed with citalopram 40mg/day and seroquel 25mg in the morning and 125mg in the evening. She has not been on remeron or pimavanserin (nuplazid)  Driving issue  Cognitive issues                         - neuropsychological evaluation was ordered but she may not proceed wit th is              - she has not had a formal moca                        - she and her daughter was informed about the rivastigmine/exelon patch   Constipation -               - need to be more aggressive daily regimen - miralax or/and senokot as well as diet     Briefly discussed new treatments  rytary   nuplazid     Educational program  1st Saturday in June     She could meet with a pharmacist that works here - Zainab Sanches, Pharm D     She is doing an exercise program - she is walking in the summer and walks in the dome in [facility] [employer]  She does go to the parkinson Classes.      Signed up for dimple      Return to see  Alyssa Santos in 3-4 months  May need to call daughter at the time of the visit to help with discussion of treatment  We did this today and it worked well as daughter is home school ing 2/5 kids.         Medications     7am 11am 3pm 8pm 10pm      Acetaminophen 500mg prn                  Aspirin 81mg 1                  Carbidopa/levodopa 25/100 Sinemet  2 2 2 2         Cholecalciferol vitamin d2000 1                  Citalopram celexa 40mg 1                  Levothyroxine synthroid 50 mcg 1                  Lisinopril prinivil/zestril 2.5mg             1      Polyethylene glycol miralax As needed                  Quetiapine 100mg seroquel             1      Quetiapine 25mg seroquel 1          1      Ropinirole requip 1mg 1 1 1            Simvastatin zocor 20mg             1      Sumatriptan imitrex 25mg As needed                                                                          Keith Styles MD  _____________________________________________________________________  PATIENT: Paige Mercado  72 year old female   : 1945  VLADIMIR: March 15, 2018     Consult requested by pcp/other  Outside records reviewed and revealed  - inserted.   History obtained from patient  History of Present Illness  71 yo woman initially seen by Dr. Palm in  and most recently in 2017 - details from two visits at end of note.  Diagnosed in   It was 2012     She was referred by another patient RENA Fenton  She needs to take a test about driving issue at [employer]  May be seeing an OT at [facility] for an evaluation     She has not had falls  She has not had hallucinations  She has done physical therapy at [facility]  Wears glasses. She has signed up may 1, 2018 right and then left cataract surgery.  Denies hearing loss  Has some constipation and uses miralax as needed and may have one bowel movement per week  Has bladder issues  -  She is not wearing a pad. She has urgency and when she is zippping her pants.   Will  see urology Dr Mtz. She has nocturia 1-2/noc  Falls asleep okay and can go back to sleep  No clear snoring. She does not know if she has involuntary movements at night  No family history of parkinson  She has problems with her balance on her bike  She has had loss of smell  She has left? Shoulder problem  Right handed with onset on the left side  Nonsmoker. Denies lung problems  Recent cold  Heart - has been taking blood pressure medication for a while  Endo: - she has been on thyroid medications for a long time. Denies diabetes  She has been on cholesterol medication for about 10 yrs  Denies  Anemia  cipro allergy and sulfa allergy  Lost 40 lbs in 2 months.  Has not seen gi  Has imitrex on her medication list - had migraines in the past which resolved with menopause  Mood issues. 17 yrs of mood problems  Has a doctor she sees at [facility] Dr Maria Del Carmen Mackay  She had been weaned off xanax and had an increased dose of her quetiapine   She has had problems with bladder infections which has caused confusion - this was in the summer and again around the turn of year December/January      1. Forgetfullness/memory/word finding problems  2. New Rxs  3. Constipation  4. Eliminate pills if possible  5. Handwriting  6. Tremor     Spoke with daughter (on phone) with patient in the visit.         Medications     7am 11am 3pm 8pm 10pm     Acetaminophen 500mg prn             Aspirin 81mg 1             Carbidopa/levodopa 25/100 Sinemet  2 2 2 2       Cholecalciferol vitamin d2000 1             Citalopram celexa 40mg 1             Levothyroxine synthroid 50 mcg 1             Lisinopril prinivil/zestril 2.5mg         1     Polyethylene glycol miralax As needed             Quetiapine 100mg seroquel         1     Quetiapine 25mg seroquel 1       1     Ropinirole requip 1mg 1 1 1         Simvastatin zocor 20mg         1     Sumatriptan imitrex 25mg As needed                                                   14 Review of systems  are  negative except for       Patient Active Problem List   Diagnosis     Allergic rhinitis     Anxiety     Conversion disorder     Depression     Essential hypertension     Generalized anxiety disorder     H/O bone density study     HTN (hypertension), benign     Hyperlipidemia     Hyperplastic polyps of stomach     Hypothyroidism     Lumbago     Migraine     Osteoarthrosis     Parkinson disease (H)     Parkinson's disease (H)     Parotid mass     PD (Parkinson's disease) (H)     Pyelonephritis     Prediabetes     S/P colonoscopy     Warthin's tumor               Allergies   Allergen Reactions     Ciprofloxacin GI Disturbance     Sulfa Drugs GI Disturbance       Past Surgical History          Past Surgical History:   Procedure Laterality Date     GYN SURGERY         hysterectomy          Past Medical History         Past Medical History:   Diagnosis Date     Cancer (H)       breast          Social History    Social History            Social History     Marital status: Unknown       Spouse name: N/A     Number of children: N/A     Years of education: N/A          Occupational History     Not on file.           Social History Main Topics     Smoking status: Never Smoker     Smokeless tobacco: Never Used     Alcohol use No     Drug use: No     Sexual activity: Not on file           Other Topics Concern     Not on file      Social History Narrative          Family History    No family history on file.      Current Outpatient Prescriptions           Current Outpatient Prescriptions   Medication Sig Dispense Refill     acetaminophen (TYLENOL) 500 MG tablet Take 500 mg by mouth         carbidopa-levodopa (SINEMET CR)  MG per CR tablet Take 1 tablet by mouth         lisinopril (PRINIVIL/ZESTRIL) 2.5 MG tablet Take 2.5 mg by mouth         polyethylene glycol (MIRALAX/GLYCOLAX) Packet Take 17 g by mouth         QUEtiapine (SEROQUEL) 100 MG tablet Take 100 mg by mouth         QUEtiapine (SEROQUEL) 25 MG tablet Take 1 tabs  in the morning and 1 tab at bedtime. (takes with a 100 mg tab at hs). DOSE INCREASE.         rOPINIRole (REQUIP) 1 MG tablet Take 1 mg by mouth         SUMAtriptan (IMITREX) 25 MG tablet Take 25 mg by mouth         levothyroxine (SYNTHROID) 50 MCG tablet Take 50 mcg by mouth daily.         aspirin (ASPIRIN LOW DOSE) 81 MG tablet Take 1 tablet by mouth daily.         Cholecalciferol (VITAMIN D) 2000 UNIT CAPS Take  by mouth.         simvastatin (ZOCOR) 20 MG tablet Take 20 mg by mouth At Bedtime.         citalopram (CELEXA) 40 MG tablet Take 40 mg by mouth daily.             Examination  B/P: Data Unavailable, T: Data Unavailable, P: Data Unavailable, R: Data Unavailable 0 lbs 0 oz  There were no vitals taken for this visit., There is no height or weight on file to calculate BMI.   Vitals signs were added and reviewed if not above. Please refer to the chart from this visit.     General examination: well developed, nourished and normal affect  Carotid: No bruits. Chest CTA, Heart regular without gallops or murmurs. Abdomen soft nontender, no masses, bowel sounds intact. Periphery: normal pulses without edema. No skin lesions. MENTAL STATUS:  Alert, oriented x - unable to state the month but can state the day of week and year.  Speech fluent with normal naming, repetition, comprehension.  Good right-left orientation, Can remember 2/3 objects. Able to spell world backwards.   CRANIAL NERVES:  Disks flat. Pupils are equal, round, reactive to light.  Normal vascularity and fields. Extraocular movements full.  Facial sensation and movement normal.  Hearing intact. Palate moves symmetrically.  Tongue midline.  Sternocleidomastoid and trapezius strength intact.  Neck strength was normal.  NEUROLOGIC:  Tone: slight increased tone. Motor in upper and lower extremities. 5/5.  Reflexes 2/4.  Toe signs downgoing.  Good finger-nose-finger, fine finger movement, heel-shin maneuver, sensation to light touch, position sense and  vibration and temperature was normal. Gait normal. Romberg and postural stability intact. Tremor  Bilateral resting tremor.      Progress Notes  - in this encounter    LitzyLibrado villagomez,  - 2017 1:33 PM CST      NAME: TRUPTI HICKS MR#: [id number]  CSN: [id number]  AUTHENTICATING CLINICIAN: Papi DO Litzy  CONFIRM #: [id number]  LOC: [id number]    CLINIC PROGRESS NOTE    DATE OF VISIT: 2017  : 1945    Mrs. Hicks returns for followup. She has Parkinson's disease. Her big issue for the day is the fact that she has had her driving privileges curtailed. This is on the strength of an OT evaluation for which she did not do well.     She has abided by this decision, but is quite unhappy.     From a Parkinson's point of view, she is doing pretty well. Speech is normal. No problems with drooling. No particular problems using eating utensils or taking care of her other activities of daily living. Has not had any falls.     She does have anxiety and is working with Psychiatry. For this she takes Seroquel 100 mg at night and 25 mg twice during the day.     She is also on Celexa 40 mg a day.     She is on carbidopa/levodopa immediate release 25/100 size tablets 2 tablets at 7 a.m., 11 a.m., 3 p.m., and 8 p.m. She is also on ropinirole 1 mg 3 times a day.     There have been no hallucinations.     She is somewhat slowed in her movements, but easy to understand in her speech. Extraocular movements are full. Face moves symmetrically. Gait ignition, posture actually pretty good. Turns are adequate, showing no postural instability.    IMPRESSION:  Parkinson's disease. Reasonably well medicated and enjoying fairly normal movements. I will leave her Parkinson's medicines the same.     The big issue for the day is that of her restricted driving. There were abnormalities on her pre-driving OT assessment which would suggest significant risk. I cannot sign off on her driving. She would like to contest this. The way forward is  a formal driving evaluation. Only if she passed with reasonable facility would I consider allowing her to drive again. This was discussed in no uncertain terms. I will discuss this with Dr. Villaseñor. I will see her back in followup in 4 months' time.     TT: 30. CT: 25.    CARMELO:MICHAEL C:   D:17 13:33 T: 17 13:58 CONFIRM #: [id number]       Progress Notes  - in this encounter    Librado Mcghee DO - 2012 11:41 AM CST  Formatting of this note may be different from the original.  Progress Notes signed by Librado Mcghee DO at 12 1200   Author: Librado Mcghee DO Service: (none) Author Type: Physician   Filed: 12 1200 Note Time: 12 1141 Status: Signed   : Librado Mcghee DO (Physician)       NAME: TRUPTI HICKS MR#: [id number]  CSN: [id number]  AUTHENTICATING CLINICIAN: Librado Mcghee DO  CONFIRM #: [id number]  LOC: [id number]    CLINIC PROGRESS NOTE    DATE OF VISIT: 2012  : 1945    I am seeing Mrs. Hicks at the request of Dr. Villaseñor. Patient has history of tremor. She is felt to have a postural tremor, probably essential tremor of her outstretched hand. This was pointed out to her perhaps 6 or 7 months ago, she really did not notice it. But since that time, she has noticed it, usually when she is doing something with her hand. Does not freeze, festinate or fall. She drags her left foot on occasion.    PAST MEDICAL HISTORY:  Has a history of constipation for number of years. Also treated for depression/anxiety. No hallucinations. No cognitive changes. No sleep disturbance that she knows of. She is treated for hypertension, hyperlipidemia, hypothyroidism. History of migraine.    MEDICATIONS:  Normally takes p.r.n. alprazolam, aspirin a day. Otherwise on Celexa 40 mg a day. Zocor, Synthroid, p.r.n. Imitrex.    ALLERGIC:  Sulfa, ciprofloxacin.    SOCIAL HISTORY:  Nonsmoker. . Retired teacher.    PHYSICAL EXAMINATION:  She has a decreased blink frequency but reasonably normal voice  volume. She has a high-frequency, subtle low amplitude tremor of her outstretched hand on the left, maybe a hint of such on the right, but it is fairly asymmetric. She does have a rest tremor of her left hand which is different amplitude, different frequency, being higher amplitude and slower frequency. Finger taps show some mild impairment as the longer she goes the smaller the amplitude becomes and the slower is the tap right. Same is true for rapid finger flexion extension, rapid alternating supination and pronation. She has a definite rigidity on the left with reinforcement maneuvers. Finger taps and tone are normal on the right. She has decreased heel taps on the left versus the right. Able to cross her arms over   chest and stand without difficulty. Initiates her gait without freezing or festinating. Stride length is good, but she has decreased arm swing on the left with re-emergent tremor of her left hand which is the slower frequency, higher amplitude tremor previously described. Gross power is normal throughout. Cranial nerves are intact. No tremor of her chin noted. Gross sensory exam is intact. Reflexes symmetric for biceps, triceps, brachioradialis, knee and ankle jerks. Toes are downgoing. Finger-nose-finger is intact.    ASSESSMENT:  Movement disorder with akinetic rigid features. She has actually 2 kinds of tremor. She has a high-frequency low-amplitude tremor which is more essential tremor like, but I think the more worrisome part of her exam is rest tremor that I am noticing which is subtle and intermittent but definitely present as well as bradykinesia and rigidity. This bespeaks to a Parkinson's-like state.     I discussed this with her. Naturally she is sad about the way our discussion went, but I gave her reason for not pushing the panic button and allowing me to image her head with an MRI and get a full evaluation through [facility] with PT, OT, speech, family, and Nursing  "Services. I will then discuss medicines with her when I see her in followup. I do not think that she needs the medicines from a safety and perhaps not from a functional point of view at this point in time.     Will discuss this with primary care.      ______________________________________      Patient was asked about 14 Review of systems including changes in vision (dry eyes, double vision), hearing, heart, lungs, musculoskeletal, depression, anxiety, snoring, RBD, insomnia, urinary frequency, urinary urgency, constipation, swallowing problems, hematological, ID, allergies, skin problems: seborrhea, endocrinological: thyroid, diabetes, cholesterol; balance, weight changes, and other neurological problems and these were not significant at this time except for   Patient Active Problem List   Diagnosis     Allergic rhinitis     Anxiety     Conversion disorder     Depression     Essential hypertension     Generalized anxiety disorder     H/O bone density study     HTN (hypertension), benign     Hyperlipidemia     Hyperplastic polyps of stomach     Hypothyroidism     Lumbago     Migraine     Osteoarthrosis     Parkinson disease (H)     Parkinson's disease (H)     Parotid mass     PD (Parkinson's disease) (H)     Pyelonephritis     Prediabetes     S/P colonoscopy     Warthin's tumor     Mild cognitive impairment          Allergies   Allergen Reactions     Ciprofloxacin GI Disturbance     Sulfa Drugs GI Disturbance     Xanax [Alprazolam] Other (See Comments)     \"zombie-like\" and confused     Past Surgical History:   Procedure Laterality Date     ARTHROSCOPY SHOULDER       EYE SURGERY Right     scheduled right cataract 1st may 2018     EYE SURGERY Left     scheduled left cataract may 2018     GYN SURGERY      hysterectomy - Partial?     HEMORRHOIDECTOMY       MASTECTOMY Right     breast cancer  2000 or so     PAROTIDECTOMY Left     [place] tumor 2012     TONSILLECTOMY      3 yrs of age     Past Medical History: "   Diagnosis Date     Cancer (H)     breast     Social History     Social History     Marital status:      Spouse name: N/A     Number of children: N/A     Years of education: N/A     Occupational History     Not on file.     Social History Main Topics     Smoking status: Never Smoker     Smokeless tobacco: Never Used     Alcohol use No     Drug use: No     Sexual activity: No     Other Topics Concern     Parent/Sibling W/ Cabg, Mi Or Angioplasty Before 65f 55m? No     Social History Narrative    lives in [facility]    Ibis Bethea is daughter       Drug and lactation database from the [place] [employer] of Medicine:  http://toxnet.nlm.nih.gov/cgi-bin/sis/htmlgen?LACT      B/P: Data Unavailable, T: Data Unavailable, P: Data Unavailable, R: Data Unavailable 0 lbs 0 oz  not currently breastfeeding., There is no height or weight on file to calculate BMI.  Medications and Vitals not listed above were documented in the cart and reviewed by me.     Current Outpatient Prescriptions   Medication Sig Dispense Refill     acetaminophen (TYLENOL) 500 MG tablet Take 1 tablet (500 mg) by mouth every 4 hours as needed for mild pain       aspirin (ASPIRIN LOW DOSE) 81 MG tablet 81mg tablet @ 7am 30 tablet      carbidopa-levodopa (SINEMET)  MG per tablet 2 tabs @ 7am, 11am, 3pm and 8pm 720 tablet 3     Cholecalciferol (VITAMIN D) 2000 UNITS CAPS 2000 units @ 7am 30 capsule      citalopram (CELEXA) 40 MG tablet 40 mg tablet by mouth @ 10pm 90 tablet      levothyroxine (SYNTHROID) 50 MCG tablet 50mcg tablet by mouth @ 7am 90 tablet 11     lisinopril (PRINIVIL/ZESTRIL) 2.5 MG tablet 2.5mg tablet by mouth @ 10pm 90 tablet 3     polyethylene glycol (MIRALAX/GLYCOLAX) Packet Take 17 g by mouth daily as needed for constipation 7 packet      QUEtiapine (SEROQUEL) 100 MG tablet 100mg tablet @ 10pm; takes with a 25mg tablet 60 tablet 11     QUEtiapine (SEROQUEL) 25 MG tablet 1 tab @ 7am 1 tablet @ 10pm (along  with 100mg at bedtime) 60 tablet      rivastigmine (EXELON) 13.3 MG/24HR 24 hr patch Place 1 patch onto the skin daily 30 patch 11     rOPINIRole (REQUIP) 1 MG tablet 1mg tablet by mouth @ 7am, 11am and 3pm 270 tablet 3     simvastatin (ZOCOR) 20 MG tablet 20mg tablet by mouth @ 10pm 30 tablet      SUMAtriptan (IMITREX) 25 MG tablet Take 1 tablet (25 mg) by mouth at onset of headache for migraine 30 tablet          Keith Styles MD

## 2018-09-18 NOTE — NURSING NOTE
Chief Complaint   Patient presents with     RECHECK     [facility] RETURN MOVEMENT DISORDER       Cathi Mcbride, EMT

## 2018-10-10 ENCOUNTER — OFFICE VISIT (OUTPATIENT)
Dept: PEDIATRICS | Facility: CLINIC | Age: 73
End: 2018-10-10
Payer: COMMERCIAL

## 2018-10-10 VITALS
HEIGHT: 63 IN | WEIGHT: 136.7 LBS | HEART RATE: 92 BPM | TEMPERATURE: 98.2 F | DIASTOLIC BLOOD PRESSURE: 62 MMHG | SYSTOLIC BLOOD PRESSURE: 106 MMHG | OXYGEN SATURATION: 97 % | BODY MASS INDEX: 24.22 KG/M2

## 2018-10-10 DIAGNOSIS — E03.8 OTHER SPECIFIED HYPOTHYROIDISM: ICD-10-CM

## 2018-10-10 DIAGNOSIS — E78.49 OTHER HYPERLIPIDEMIA: ICD-10-CM

## 2018-10-10 DIAGNOSIS — G20.A1 PARKINSON'S DISEASE (H): Primary | ICD-10-CM

## 2018-10-10 DIAGNOSIS — F32.0 CURRENT MILD EPISODE OF MAJOR DEPRESSIVE DISORDER, UNSPECIFIED WHETHER RECURRENT (H): ICD-10-CM

## 2018-10-10 DIAGNOSIS — Z12.31 VISIT FOR SCREENING MAMMOGRAM: ICD-10-CM

## 2018-10-10 DIAGNOSIS — Z85.3 PERSONAL HISTORY OF MALIGNANT NEOPLASM OF BREAST: ICD-10-CM

## 2018-10-10 DIAGNOSIS — I10 ESSENTIAL HYPERTENSION: ICD-10-CM

## 2018-10-10 PROCEDURE — 99387 INIT PM E/M NEW PAT 65+ YRS: CPT | Performed by: PEDIATRICS

## 2018-10-10 PROCEDURE — 77067 SCR MAMMO BI INCL CAD: CPT | Mod: TC

## 2018-10-10 RX ORDER — LISINOPRIL 2.5 MG/1
TABLET ORAL
Qty: 90 TABLET | Refills: 3 | Status: CANCELLED | OUTPATIENT
Start: 2018-10-10

## 2018-10-10 RX ORDER — LEVOTHYROXINE SODIUM 50 UG/1
TABLET ORAL
Qty: 90 TABLET | Refills: 11 | Status: CANCELLED | OUTPATIENT
Start: 2018-10-10

## 2018-10-10 RX ORDER — CITALOPRAM HYDROBROMIDE 40 MG/1
TABLET ORAL
Qty: 90 TABLET | Status: CANCELLED | OUTPATIENT
Start: 2018-10-10

## 2018-10-10 RX ORDER — SIMVASTATIN 20 MG
TABLET ORAL
Qty: 30 TABLET | Status: CANCELLED | OUTPATIENT
Start: 2018-10-10

## 2018-10-10 ASSESSMENT — ENCOUNTER SYMPTOMS
WEAKNESS: 0
BREAST MASS: 0
MYALGIAS: 0
NAUSEA: 0
EYE PAIN: 0
NERVOUS/ANXIOUS: 1
HEADACHES: 0
PARESTHESIAS: 0
COUGH: 0
FEVER: 0
CHILLS: 0
SHORTNESS OF BREATH: 0
CONSTIPATION: 1
SORE THROAT: 0
ABDOMINAL PAIN: 0
HEMATURIA: 0
DIARRHEA: 1
PALPITATIONS: 0
HEMATOCHEZIA: 0
DIZZINESS: 1
JOINT SWELLING: 0
FREQUENCY: 0
ARTHRALGIAS: 0
HEARTBURN: 0
DYSURIA: 0

## 2018-10-10 ASSESSMENT — ACTIVITIES OF DAILY LIVING (ADL)
CURRENT_FUNCTION: TRANSPORTATION REQUIRES ASSISTANCE
I_NEED_ASSISTANCE_FOR_THE_FOLLOWING_DAILY_ACTIVITIES:: TRANSPORTATION
I_NEED_ASSISTANCE_FOR_THE_FOLLOWING_DAILY_ACTIVITIES:: MEDICATION ADMINISTRATION
CURRENT_FUNCTION: MEDICATION ADMINISTRATION REQUIRES ASSISTANCE

## 2018-10-10 NOTE — MR AVS SNAPSHOT
After Visit Summary   10/10/2018    Paige Mercado    MRN: [id number]           Patient Information     Date Of Birth          1945        Visit Information        Provider Department      10/10/2018 1:20 PM Ely Henderson MD [facility]        Today's Diagnoses     Other hyperlipidemia    -  1    Parkinson's disease (H)          Care Instructions    Recommend new Shingles vaccine.  This is being given at the pharmacy. Check with your insurance to see if this is covered.  You can also go to any pharmacy and they can check to see if you are covered and give you the shot.    Make lab only visit and nurse only visit in 1 week after stopping lisinopril.    Plan to follow up in 6 months.          Preventive Health Recommendations    Female Ages 65 +    Yearly exam:     See your health care provider every year in order to  o Review health changes.   o Discuss preventive care.    o Review your medicines if your doctor has prescribed any.      You no longer need a yearly Pap test unless you've had an abnormal Pap test in the past 10 years. If you have vaginal symptoms, such as bleeding or discharge, be sure to talk with your provider about a Pap test.      Every 1 to 2 years, have a mammogram.  If you are over 69, talk with your health care provider about whether or not you want to continue having screening mammograms.      Every 10 years, have a colonoscopy. Or, have a yearly FIT test (stool test). These exams will check for colon cancer.       Have a cholesterol test every 5 years, or more often if your doctor advises it.       Have a diabetes test (fasting glucose) every three years. If you are at risk for diabetes, you should have this test more often.       At age 65, have a bone density scan (DEXA) to check for osteoporosis (brittle bone disease).    Shots:    Get a flu shot each year.    Get a tetanus shot every 10 years.    Talk to your doctor about your pneumonia vaccines. There  are now two you should receive - Pneumovax (PPSV 23) and Prevnar (PCV 13).    Talk to your pharmacist about the shingles vaccine.    Talk to your doctor about the hepatitis B vaccine.    Nutrition:     Eat at least 5 servings of fruits and vegetables each day.      Eat whole-grain bread, whole-wheat pasta and brown rice instead of white grains and rice.      Get adequate Calcium and Vitamin D.     Lifestyle    Exercise at least 150 minutes a week (30 minutes a day, 5 days a week). This will help you control your weight and prevent disease.      Limit alcohol to one drink per day.      No smoking.       Wear sunscreen to prevent skin cancer.       See your dentist twice a year for an exam and cleaning.      See your eye doctor every 1 to 2 years to screen for conditions such as glaucoma, macular degeneration and cataracts.          Follow-ups after your visit        Additional Services     MED THERAPY MANAGE REFERRAL       Your provider has referred you to: **[facility] Scheduling (numerous locations) [phone]   [email or web address]    Reason for Referral: trouble taking medication/remembering at home    The [facility] department will contact you to schedule an appointment.  You may also schedule the appointment by calling [phone].  For [facility] [place] patients, please call [phone] to confirm/schedule your appointment on the next business day.    This service is designed to help you get the most from your medications.  A specially trained Pharmacist will work closely with you and your providers to solve any questions, concerns, issues or problems related to your medications.    Please bring all of your prescription and non-prescription medications (such as vitamins, over-the-counter medications, and herbals) or a detailed medication list to your appointment.    If you have a glucose meter or  other home monitoring information, please also bring this to your appointment (i.e. blood glucose log, blood pressure log, pain log, etc.).                  Your next 10 appointments already scheduled     Dec 19, 2018  1:15 PM CST   (Arrive by 1:00 PM)   Return Movement Disorder with ED Balderrama [facility] ([facility] [place])    [address] [address] [phone]              Future tests that were ordered for you today     Open Future Orders        Priority Expected Expires Ordered    Lipid Profile (Chol, Trig, HDL, LDL calc) Routine 10/17/2018 12/10/2018 10/10/2018    Comprehensive metabolic panel Routine 10/17/2018 12/10/2018 10/10/2018            Who to contact     If you have questions or need follow up information about today's clinic visit or your schedule please contact [facility] directly at [phone].  Normal or non-critical lab and imaging results will be communicated to you by Summlyhart, letter or phone within 4 business days after the clinic has received the results. If you do not hear from us within 7 days, please contact the clinic through Summlyhart or phone. If you have a critical or abnormal lab result, we will notify you by phone as soon as possible.  Submit refill requests through CNG-One or call your pharmacy and they will forward the refill request to us. Please allow 3 business days for your refill to be completed.          Additional Information About Your Visit        Summlyhart Information     C[employer]-One gives you secure access to your electronic health record. If you see a primary care provider, you can also send messages to your care team and make appointments. If you have questions, please call your primary care clinic.  If you do not have a primary care provider, please call [phone] and they will assist you.        Care EveryWhere ID     This is your Care EveryWhere ID. This could be used by  "other organizations to access your [facility] medical records  [id number]        Your Vitals Were     Pulse Temperature Height Pulse Oximetry BMI (Body Mass Index)       92 98.2  F (36.8  C) (Oral) 5' 3.39\" (1.61 m) 97% 23.92 kg/m2        Blood Pressure from Last 3 Encounters:   10/10/18 106/62   09/18/18 135/80   06/18/18 136/80    Weight from Last 3 Encounters:   10/10/18 136 lb 11.2 oz (62 kg)   09/18/18 134 lb 6.4 oz (61 kg)   06/18/18 134 lb 12.8 oz (61.1 kg)              We Performed the Following     MED THERAPY MANAGE REFERRAL        Primary Care Provider Office Phone # Fax #    [facility] [phone]       [facility] [address] [address]        Equal Access to Services     FERNANDA GARLAND : Hadii aad ku hadasho Sosusanneali, waaxda luqadaha, qaybta kaalmada adeegyada, valerie mena . So [facility] [phone].    ATENCIÓN: Si habla español, tiene a eller disposición servicios gratuitos de asistencia lingüística. Jerilyn al [phone].    We comply with applicable federal civil rights laws and [place] laws. We do not discriminate on the basis of race, color, national origin, age, disability, sex, sexual orientation, or gender identity.            Thank you!     Thank you for choosing [facility]  for your care. Our goal is always to provide you with excellent care. Hearing back from our patients is one way we can continue to improve our services. Please take a few minutes to complete the written survey that you may receive in the mail after your visit with us. Thank you!             Your Updated Medication List - Protect others around you: Learn how to safely use, store and throw away your medicines at www.disposemymeds.org.          This list is accurate as of 10/10/18  2:20 PM.  Always use your most recent med list.                   Brand Name Dispense Instructions for use Diagnosis    acetaminophen 500 MG tablet    TYLENOL     Take 1 " tablet (500 mg) by mouth every 4 hours as needed for mild pain        ASPIRIN LOW DOSE 81 MG tablet   Generic drug:  aspirin     30 tablet    81mg tablet @ 7am        carbidopa-levodopa  MG per tablet    SINEMET    810 tablet    2 tabs @ 7am, 11am, 3pm and 8pm and 1 tab as needed    Parkinsonism, unspecified Parkinsonism type (H)       celeXA 40 MG tablet   Generic drug:  citalopram     90 tablet    40 mg tablet by mouth @ 10pm        cyanocobalamin 1000 MCG Tabs     30 tablet    1000mcg dissolvable tab by mouth daily @ 7am        lisinopril 2.5 MG tablet    PRINIVIL/Zestril    90 tablet    2.5mg tablet by mouth @ 10pm        memantine 5 MG tablet    NAMENDA    120 tablet    5mg/daily x 1 wk; then 5mg twice daily x1wk then 5mg-10mg x 1wk; then 10mg by mouth twice daily    Parkinson's disease (H)       NONFORMULARY      Runs smoothly tea from cub between 9-10pm        polyethylene glycol Packet    MIRALAX/GLYCOLAX    7 packet    Take 17 g by mouth daily as needed for constipation        * QUEtiapine 100 MG tablet    SEROquel    60 tablet    100mg tablet @ 10pm; takes with a 25mg tablet        * QUEtiapine 25 MG tablet    SEROquel    270 tablet    1 tab @ 7am and 2 tabs @ 10pm (along with 100mg at bedtime)    Parkinson's disease (H)       rivastigmine 13.3 MG/24HR 24 hr patch    EXELON    30 patch    Place 1 patch onto the skin daily    Memory problem, Parkinson's disease (H)       rOPINIRole 1 MG tablet    REQUIP    270 tablet    1mg tablet by mouth @ 7am, 11am and 3pm        simvastatin 20 MG tablet    ZOCOR    30 tablet    20mg tablet by mouth @ 10pm        SUMAtriptan 25 MG tablet    IMITREX    30 tablet    Take 1 tablet (25 mg) by mouth at onset of headache for migraine        SYNTHROID 50 MCG tablet   Generic drug:  levothyroxine     90 tablet    50mcg tablet by mouth @ 7am        vitamin D 2000 units Caps     30 capsule    2000 units @ 7am        * Notice:  This list has 2 medication(s) that are the same  as other medications prescribed for you. Read the directions carefully, and ask your doctor or other care provider to review them with you.

## 2018-10-10 NOTE — PATIENT INSTRUCTIONS
Recommend new Shingles vaccine.  This is being given at the pharmacy. Check with your insurance to see if this is covered.  You can also go to any pharmacy and they can check to see if you are covered and give you the shot.    Make lab only visit and nurse only visit in 1 week after stopping lisinopril.    Plan to follow up in 6 months.          Preventive Health Recommendations    Female Ages 65 +    Yearly exam:     See your health care provider every year in order to  o Review health changes.   o Discuss preventive care.    o Review your medicines if your doctor has prescribed any.      You no longer need a yearly Pap test unless you've had an abnormal Pap test in the past 10 years. If you have vaginal symptoms, such as bleeding or discharge, be sure to talk with your provider about a Pap test.      Every 1 to 2 years, have a mammogram.  If you are over 69, talk with your health care provider about whether or not you want to continue having screening mammograms.      Every 10 years, have a colonoscopy. Or, have a yearly FIT test (stool test). These exams will check for colon cancer.       Have a cholesterol test every 5 years, or more often if your doctor advises it.       Have a diabetes test (fasting glucose) every three years. If you are at risk for diabetes, you should have this test more often.       At age 65, have a bone density scan (DEXA) to check for osteoporosis (brittle bone disease).    Shots:    Get a flu shot each year.    Get a tetanus shot every 10 years.    Talk to your doctor about your pneumonia vaccines. There are now two you should receive - Pneumovax (PPSV 23) and Prevnar (PCV 13).    Talk to your pharmacist about the shingles vaccine.    Talk to your doctor about the hepatitis B vaccine.    Nutrition:     Eat at least 5 servings of fruits and vegetables each day.      Eat whole-grain bread, whole-wheat pasta and brown rice instead of white grains and rice.      Get adequate Calcium and  Vitamin D.     Lifestyle    Exercise at least 150 minutes a week (30 minutes a day, 5 days a week). This will help you control your weight and prevent disease.      Limit alcohol to one drink per day.      No smoking.       Wear sunscreen to prevent skin cancer.       See your dentist twice a year for an exam and cleaning.      See your eye doctor every 1 to 2 years to screen for conditions such as glaucoma, macular degeneration and cataracts.

## 2018-10-10 NOTE — PROGRESS NOTES
SUBJECTIVE:   Paige Mercado is a 73 year old female who presents for Preventive Visit.  Are you in the first 12 months of your Medicare coverage?  No  Physical   Annual:     Getting at least 3 servings of Calcium per day:  NO    Bi-annual eye exam:  Yes    Dental care twice a year:  NO    Sleep apnea or symptoms of sleep apnea:  None    Diet:  Regular (no restrictions)    Frequency of exercise:  2-3 days/week    Duration of exercise:  45-60 minutes    Taking medications regularly:  Yes    Medication side effects:  None    Additional concerns today:  No    Ability to successfully perform activities of daily living: transportation requires assistance and medication administration requires assistance    Home Safety:  No safety concerns identified    Hearing Impairment: no hearing concerns      Patient new to clinic - retired HS guidance counselor with [employer] - here today with daughter.     Fall risk:  Fallen 2 or more times in the past year?: No  Any fall with injury in the past year?: No  click delete button to remove this line now  COGNITIVE SCREEN  1) Repeat 3 items (Leader, Season, Table)    2) Clock draw: ABNORMAL patient experienced some difficulty  3) 3 item recall: Recalls NO objects   Results: 0 items recalled: PROBABLE COGNITIVE IMPAIRMENT, **INFORM PROVIDER**    Mini-CogTM Copyright ELLE Serrato. Licensed by the author for use in [facility]; reprinted with permission (garfield@.[facility]). All rights reserved.        Reviewed and updated as needed this visit by clinical staff  Tobacco  Allergies  Meds  Med Hx  Surg Hx  Fam Hx  Soc Hx        Reviewed and updated as needed this visit by Provider        Social History   Substance Use Topics     Smoking status: Never Smoker     Smokeless tobacco: Never Used     Alcohol use No       Alcohol Use 10/10/2018   If you drink alcohol do you typically have greater than 3 drinks per day OR greater than 7 drinks per week? Not Applicable               Today's  PHQ-2 Score:   PHQ-2 ( 1999 [employer]) 10/10/2018   Q1: Little interest or pleasure in doing things 0   Q2: Feeling down, depressed or hopeless 0   PHQ-2 Score 0   Q1: Little interest or pleasure in doing things Not at all   Q2: Feeling down, depressed or hopeless Not at all   PHQ-2 Score 0       Do you feel safe in your environment - Yes    Do you have a Health Care Directive?: Yes: Patient states has Advance Directive and will bring in a copy to clinic.    Current providers sharing in care for this patient include:   Patient Care Team:  Ely Henderson MD as PCP - General (Internal Medicine)  Alyson Mcgee MD as MD (Urology)  Trixie Aldridge RN as Registered Nurse (Urology)  Keith Styles MD as MD (Neurology)  Regla Santos APRN CNP as Nurse Practitioner (Nurse Practitioner)    The following health maintenance items are reviewed in Epic and correct as of today:  Health Maintenance   Topic Date Due     LIPID SCREEN Q5 YR FEMALE (SYSTEM ASSIGNED)  07/18/1988     ADVANCE DIRECTIVE PLANNING Q5 YRS  07/18/1998     DEXA SCAN SCREENING (SYSTEM ASSIGNED)  07/18/2008     PHQ-9 Q6 MONTHS  12/18/2018     FALL RISK ASSESSMENT  06/18/2019     DEPRESSION ACTION PLAN Q1 YR  06/18/2019     TETANUS IMMUNIZATION (SYSTEM ASSIGNED)  04/25/2022     COLON CANCER SCREEN (SYSTEM ASSIGNED)  03/15/2028     PNEUMOCOCCAL  Completed     INFLUENZA VACCINE  Completed         Pneumonia Vaccine:complete  Mammogram Screening: mammo this AM - does not want to do anymore - would not want to do any treatment    Review of Systems   Constitutional: Negative for chills and fever.   HENT: Negative for congestion, ear pain, hearing loss and sore throat.    Eyes: Negative for pain and visual disturbance.   Respiratory: Negative for cough and shortness of breath.    Cardiovascular: Negative for chest pain, palpitations and peripheral edema.   Gastrointestinal: Positive for constipation and diarrhea. Negative for abdominal pain,  "heartburn, hematochezia and nausea.   Breasts:  Negative for tenderness, breast mass and discharge.   Genitourinary: Negative for dysuria, frequency, genital sores, hematuria, pelvic pain, urgency, vaginal bleeding and vaginal discharge.   Musculoskeletal: Negative for arthralgias, joint swelling and myalgias.   Skin: Negative for rash.   Neurological: Positive for dizziness. Negative for weakness, headaches and paresthesias.   Psychiatric/Behavioral: Negative for mood changes. The patient is nervous/anxious.        OBJECTIVE:   /62 (BP Location: Left arm, Patient Position: Chair, Cuff Size: Adult Regular)  Pulse 92  Temp 98.2  F (36.8  C) (Oral)  Ht 5' 3.39\" (1.61 m)  Wt 136 lb 11.2 oz (62 kg)  SpO2 97%  BMI 23.92 kg/m2 Estimated body mass index is 23.92 kg/(m^2) as calculated from the following:    Height as of this encounter: 5' 3.39\" (1.61 m).    Weight as of this encounter: 136 lb 11.2 oz (62 kg).  Physical Exam  GENERAL APPEARANCE: healthy, alert and no distress  EYES: Eyes grossly normal to inspection, PERRL and conjunctivae and sclerae normal  HENT: ear canals and TM's normal, nose and mouth without ulcers or lesions, oropharynx clear and oral mucous membranes moist  NECK: no adenopathy, no asymmetry, masses, or scars and thyroid normal to palpation  RESP: lungs clear to auscultation - no rales, rhonchi or wheezes  CV: regular rate and rhythm, normal S1 S2, no S3 or S4, no murmur, click or rub, no peripheral edema and peripheral pulses strong  ABDOMEN: soft, nontender, no hepatosplenomegaly, no masses and bowel sounds normal  MS: no musculoskeletal defects are noted and gait is age appropriate without ataxia  SKIN: no suspicious lesions or rashes  NEURO: Normal strength and tone, sensory exam grossly normal, mentation intact and speech normal  PSYCH: mentation appears normal and affect normal/bright    Diagnostic Test Results:  none     ASSESSMENT / PLAN:   1. Parkinson's disease (H)  Managed by " "neurology - they are managing the sinemt, seroquel, rivastigmine, requip and the nemenda.  She is seeing them quarterly.  - MED THERAPY MANAGE REFERRAL    2. Other hyperlipidemia  - Lipid Profile (Chol, Trig, HDL, LDL calc); Future  - Comprehensive metabolic panel; Future    3. Essential hypertension  Getting positional lightheadedness - lasts for a few seconds - BP low normal.  Will try stopping medications and follow up in 1 week with nurse only and labs    4. Other specified hypothyroidism  Last TSH 7/2018 normal    5. Personal history of malignant neoplasm of breast  Patient elects to stop mammograms    6. Current mild episode of major depressive disorder, unspecified whether recurrent (H)  Does have a psychiatrist that was managing seroquel, but will probably have neuro do all of this.  PCP was managing the celexa which is what I will continue to do.    Patient having problems taking medications - daughters set up boxes, but wondering if there might be other strategies - would like to further discuss or meet with MTM.      End of Life Planning:  Patient currently has an advanced directive: No.  I have verified the patient's ablity to prepare an advanced directive/make health care decisions.  Literature was provided to assist patient in preparing an advanced directive.    COUNSELING:  Reviewed preventive health counseling, as reflected in patient instructions    BP Readings from Last 1 Encounters:   10/10/18 106/62     Estimated body mass index is 23.92 kg/(m^2) as calculated from the following:    Height as of this encounter: 5' 3.39\" (1.61 m).    Weight as of this encounter: 136 lb 11.2 oz (62 kg).           reports that she has never smoked. She has never used smokeless tobacco.      Appropriate preventive services were discussed with this patient, including applicable screening as appropriate for cardiovascular disease, diabetes, osteopenia/osteoporosis, and glaucoma.  As appropriate for age/gender, " discussed screening for colorectal cancer, prostate cancer, breast cancer, and cervical cancer. Checklist reviewing preventive services available has been given to the patient.    Reviewed patients plan of care and provided an AVS. The Basic Care Plan (routine screening as documented in Health Maintenance) for Paige meets the Care Plan requirement. This Care Plan has been established and reviewed with the Patient.    Counseling Resources:  ATP IV Guidelines  Pooled Cohorts Equation Calculator  Breast Cancer Risk Calculator  FRAX Risk Assessment  ICSI Preventive Guidelines  Dietary Guidelines for Americans, 2010  USDA's MyPlate  ASA Prophylaxis  Lung CA Screening    Ely Henderson MD  [facility]  Answers for HPI/ROS submitted by the patient on 10/10/2018   PHQ-2 Score: 0

## 2018-10-11 ENCOUNTER — TELEPHONE (OUTPATIENT)
Dept: PHARMACY | Facility: CLINIC | Age: 73
End: 2018-10-11

## 2018-10-11 NOTE — TELEPHONE ENCOUNTER
Patient called to ask about the cost of her rivastigmine patch. She states that she paid $0 for her prescription last month and she is concerned about something being wrong. I encouraged her to call [facility] to discuss which phase of Medicare donut-hole she is in and she can call me back with further questions.     Zainab Sancehs, Pharm.D.  Medication Therapy Management Pharmacist  Phone: [phone]

## 2018-10-19 DIAGNOSIS — E78.49 OTHER HYPERLIPIDEMIA: ICD-10-CM

## 2018-10-19 LAB
ALBUMIN SERPL-MCNC: 3.6 G/DL (ref 3.4–5)
ALP SERPL-CCNC: 79 U/L (ref 40–150)
ALT SERPL W P-5'-P-CCNC: 9 U/L (ref 0–50)
ANION GAP SERPL CALCULATED.3IONS-SCNC: 5 MMOL/L (ref 3–14)
AST SERPL W P-5'-P-CCNC: 11 U/L (ref 0–45)
BILIRUB SERPL-MCNC: 0.9 MG/DL (ref 0.2–1.3)
BUN SERPL-MCNC: 18 MG/DL (ref 7–30)
CALCIUM SERPL-MCNC: 9.1 MG/DL (ref 8.5–10.1)
CHLORIDE SERPL-SCNC: 108 MMOL/L (ref 94–109)
CHOLEST SERPL-MCNC: 146 MG/DL
CO2 SERPL-SCNC: 28 MMOL/L (ref 20–32)
CREAT SERPL-MCNC: 0.72 MG/DL (ref 0.52–1.04)
GFR SERPL CREATININE-BSD FRML MDRD: 79 ML/MIN/1.7M2
GLUCOSE SERPL-MCNC: 92 MG/DL (ref 70–99)
HDLC SERPL-MCNC: 70 MG/DL
LDLC SERPL CALC-MCNC: 69 MG/DL
NONHDLC SERPL-MCNC: 76 MG/DL
POTASSIUM SERPL-SCNC: 4.1 MMOL/L (ref 3.4–5.3)
PROT SERPL-MCNC: 6.8 G/DL (ref 6.8–8.8)
SODIUM SERPL-SCNC: 141 MMOL/L (ref 133–144)
TRIGL SERPL-MCNC: 37 MG/DL

## 2018-10-19 PROCEDURE — 36415 COLL VENOUS BLD VENIPUNCTURE: CPT | Performed by: PEDIATRICS

## 2018-10-19 PROCEDURE — 80061 LIPID PANEL: CPT | Performed by: PEDIATRICS

## 2018-10-19 PROCEDURE — 80053 COMPREHEN METABOLIC PANEL: CPT | Performed by: PEDIATRICS

## 2018-11-08 ENCOUNTER — TELEPHONE (OUTPATIENT)
Dept: PHARMACY | Facility: CLINIC | Age: 73
End: 2018-11-08

## 2018-11-08 DIAGNOSIS — G20.C PARKINSONISM, UNSPECIFIED PARKINSONISM TYPE (H): ICD-10-CM

## 2018-11-08 DIAGNOSIS — R41.3 MEMORY PROBLEM: ICD-10-CM

## 2018-11-08 DIAGNOSIS — G20.A1 PARKINSON'S DISEASE (H): ICD-10-CM

## 2018-11-08 NOTE — TELEPHONE ENCOUNTER
Patient called [facility] pharmacist requesting help obtaining her medication refills.     Upon chart review, it appears as though her medications were refilled for an entire year to [facility] in [place]. Called patient back and left message with this information and she can call me with further questions.    Zainab Sanches, Pharm.D.  Medication Therapy Management Pharmacist  Phone: [phone]

## 2018-11-08 NOTE — TELEPHONE ENCOUNTER
Patient called expressing confusion over her medications and whether [employer] has refills. It appears in Epic as though many of her prescriptions have been sent to [facility], which may be adding to the confusion. [facility] pharmacist will call [facility] to clarify which medications need refills at this time.    Zainab Sanches, Pharm.D.  Medication Therapy Management Pharmacist  Phone: [phone]

## 2018-11-13 DIAGNOSIS — E78.49 OTHER HYPERLIPIDEMIA: ICD-10-CM

## 2018-11-13 DIAGNOSIS — I10 ESSENTIAL HYPERTENSION: Primary | ICD-10-CM

## 2018-11-13 DIAGNOSIS — E03.9 HYPOTHYROIDISM, UNSPECIFIED TYPE: ICD-10-CM

## 2018-11-13 RX ORDER — ROPINIROLE 1 MG/1
TABLET, FILM COATED ORAL
Qty: 270 TABLET | Refills: 3 | Status: SHIPPED | OUTPATIENT
Start: 2018-11-13 | End: 2018-12-13

## 2018-11-13 RX ORDER — CARBIDOPA AND LEVODOPA 25; 100 MG/1; MG/1
TABLET ORAL
Qty: 810 TABLET | Refills: 3 | Status: SHIPPED | OUTPATIENT
Start: 2018-11-13 | End: 2018-12-13

## 2018-11-13 NOTE — TELEPHONE ENCOUNTER
Patient calling to speak with the nurse. She needs her medications to be sent to [facility] pharmacy. She needs the nurse to call her to help figure out which ones to send.

## 2018-11-13 NOTE — TELEPHONE ENCOUNTER
Called [facility] pharmacy and reviewed that they have the following medications on file, which can be refilled by the patient:     Citalopram 40 mg    Quetiapine 25 and 100 mg     [facility] has fill history for the following medications in 2018, but no refills:     Carbidopa-levodopa    Levothyroxine    Lisinopril    Ropinirole     Simvastatin    Called patient back to let her know that I refilled the following medications (per CPA with Dr. Styles):    Carbidopa-levodopa    Ropinirole     She needs to call her PCP, Dr. Henderson, for refills on the following medications:     Simvastatin    Lisinopril    Levothyroxine    Zainab Sanches, Pharm.D.  Medication Therapy Management Pharmacist  Phone: [phone]

## 2018-11-13 NOTE — TELEPHONE ENCOUNTER
Call to patient-LM for patient to call back.  Farida Hung, RAMIREZ  Message handled by Nurse Triage.

## 2018-11-13 NOTE — TELEPHONE ENCOUNTER
Routing to Dr. Henderson - Please advise on refills for the pended meds. This is the first time filling. Confirmed dose & sig with patient.     No call back needed if rx's sent.     Patient calling back. She needs new rx's for Simvastatin, Lisinopril & Levothyroxine to [facility].   Also see the 11/8/18 telephone encounter for details on JAY Lamb calling [employer].     BP Readings from Last 3 Encounters:   10/10/18 106/62   09/18/18 135/80   06/18/18 136/80     Last Comprehensive Metabolic Panel:  Sodium   Date Value Ref Range Status   10/19/2018 141 133 - 144 mmol/L Final     Potassium   Date Value Ref Range Status   10/19/2018 4.1 3.4 - 5.3 mmol/L Final     Chloride   Date Value Ref Range Status   10/19/2018 108 94 - 109 mmol/L Final     Carbon Dioxide   Date Value Ref Range Status   10/19/2018 28 20 - 32 mmol/L Final     Anion Gap   Date Value Ref Range Status   10/19/2018 5 3 - 14 mmol/L Final     Glucose   Date Value Ref Range Status   10/19/2018 92 70 - 99 mg/dL Final     Comment:     Fasting specimen     Urea Nitrogen   Date Value Ref Range Status   10/19/2018 18 7 - 30 mg/dL Final     Creatinine   Date Value Ref Range Status   10/19/2018 0.72 0.52 - 1.04 mg/dL Final     GFR Estimate   Date Value Ref Range Status   10/19/2018 79 >60 mL/min/1.7m2 Final     Comment:     Non  GFR Calc     Calcium   Date Value Ref Range Status   10/19/2018 9.1 8.5 - 10.1 mg/dL Final     Lab Results   Component Value Date    CHOL 146 10/19/2018     Lab Results   Component Value Date    HDL 70 10/19/2018     Lab Results   Component Value Date    LDL 69 10/19/2018     Lab Results   Component Value Date    TRIG 37 10/19/2018     No results found for: CHOLHDLRATIO

## 2018-11-14 PROBLEM — E03.9 HYPOTHYROIDISM, UNSPECIFIED TYPE: Status: ACTIVE | Noted: 2018-11-14

## 2018-11-14 RX ORDER — LISINOPRIL 2.5 MG/1
TABLET ORAL
Qty: 90 TABLET | Refills: 3 | Status: SHIPPED | OUTPATIENT
Start: 2018-11-14 | End: 2018-12-13

## 2018-11-14 RX ORDER — SIMVASTATIN 20 MG
TABLET ORAL
Qty: 90 TABLET | Refills: 3 | Status: SHIPPED | OUTPATIENT
Start: 2018-11-14 | End: 2019-03-01

## 2018-11-14 RX ORDER — LEVOTHYROXINE SODIUM 50 UG/1
TABLET ORAL
Qty: 90 TABLET | Refills: 3 | Status: SHIPPED | OUTPATIENT
Start: 2018-11-14 | End: 2019-10-29

## 2018-11-14 RX ORDER — RIVASTIGMINE 13.3 MG/24H
1 PATCH, EXTENDED RELEASE TRANSDERMAL DAILY
Qty: 90 PATCH | Refills: 3 | Status: SHIPPED | OUTPATIENT
Start: 2018-11-14 | End: 2018-11-19

## 2018-11-14 NOTE — TELEPHONE ENCOUNTER
Patient called back and said she would like a 90 day supply of rivastigmine. Updated the prescription via collaborative practice agreement with Dr. Styles.     Zainab Sanches, Pharm.D.  Medication Therapy Management Pharmacist  Phone: [phone]

## 2018-11-15 ENCOUNTER — TELEPHONE (OUTPATIENT)
Dept: PHARMACY | Facility: CLINIC | Age: 73
End: 2018-11-15

## 2018-11-15 NOTE — TELEPHONE ENCOUNTER
Patient's daughter, Rosalva, called inquiring about the risks/benefits of the patient starting memantine, which was previously prescribed by Dr. Styles. We discussed that it may not provide additional benefit for her cognition, but we won't know until she tries it. The side effects are typically minimal, but there are drug interactions, including to ranitidine and trimethoprim, which I shared with her. Patient/daughter will call with any further questions.     Zainab Sanches, Pharm.D.  Medication Therapy Management Pharmacist  Phone: [phone]

## 2018-11-19 ENCOUNTER — TELEPHONE (OUTPATIENT)
Dept: PHARMACY | Facility: CLINIC | Age: 73
End: 2018-11-19

## 2018-11-19 DIAGNOSIS — R41.3 MEMORY PROBLEM: ICD-10-CM

## 2018-11-19 DIAGNOSIS — G20.A1 PARKINSON'S DISEASE (H): ICD-10-CM

## 2018-11-19 RX ORDER — RIVASTIGMINE 13.3 MG/24H
1 PATCH, EXTENDED RELEASE TRANSDERMAL DAILY
Qty: 90 PATCH | Refills: 3 | Status: SHIPPED | OUTPATIENT
Start: 2018-11-19 | End: 2018-12-13

## 2018-11-19 NOTE — TELEPHONE ENCOUNTER
Patient called and left message for MTM pharmacist inquiring about the cost of her Exelon patch.     MTM pharmacist called [facility] and determined that patient cannot get her Exelon patch with the Assistance Fund through [employer] - she has to get it from a local pharmacy.   Spoke with patient and [facility] Sent to [facility] via CPA with Dr. Styles. Discontinued Rx from [facility].    Next MTM appointment scheduled for 12/13/18.     Zainab Sanches, Pharm.D.  Medication Therapy Management Pharmacist  Phone: [phone]

## 2018-12-06 NOTE — PROGRESS NOTES
Diagnosis/Summary/Recommendations:    PATIENT: Paige Mercado  73 year old female     : 1945    VLADIMIR: 2018    Parkinsonism    Medications     7am 11am 3pm 8pm 10pm      Acetaminophen 500mg RARE                  Alprazolam xanax 0.25mg STOPPED        Aspirin 81mg        1 (MAY STOP)         Carbidopa/levodopa Sinemet CR 25/100 NOT Taking        Carbidopa/levodopa 25/100 Sinemet  2 2 2 2         Cholecalciferol vitamin d2000 1                  Citalopram celexa 40mg 1                  Cyanocobalamin vitamin B12 1000mcg RAN OUT        Fluticasone flonase 50mcg/act nasall spray         Levothyroxine synthroid 50 mcg 1                  Lisinopril prinivil/zestril 2.5mg  not taking          1      Nonformulary runs smoothly tea SMOOTH MOVE         Polyethylene glycol miralax           1/2-1CAP      Quetiapine 100mg seroquel             1      Quetiapine 25mg seroquel 1          1      Rivastigmine exelon 13.3mg/24 hr 24 hr patch 1        Ropinirole requip 1mg 1 1 1            Simvastatin zocor 20mg             1      Sumatriptan imitrex 25mg As needed                                                                    History obtained from patient    PLAN  NAMENDA TRIAL  ?BOTOX FOR OROFACIAL DYSKINESIA  COLD MEDICATION FOR PARKINSON  DOSING ERRORS   HOME HEALTH ISSUES.                 Documentation of a Face-to-Face Physician Encounter 2018    Paige Mercado  1945  [id number]    I certify that this patient is under my care and that I, or a nurse practitioner or physician's assistant working with me, had a face-to-face encounter that meets the physician face-to-face encounter requirements with this patient on: 2018.    The encounter with the patient was in whole, or in part, for the following medical condition, which is the primary reason for home health care:  Patient Active Problem List   Diagnosis     Allergic rhinitis     Anxiety     Conversion disorder     Depression      Essential hypertension     Generalized anxiety disorder     H/O bone density study     HTN (hypertension), benign     Hyperlipidemia     Hyperplastic polyps of stomach     Hypothyroidism     Lumbago     Migraine     Osteoarthrosis     Parkinson disease (H)     Parkinson's disease (H)     Parotid mass     PD (Parkinson's disease) (H)     Pyelonephritis     Prediabetes     S/P colonoscopy     Warthin's tumor     Mild cognitive impairment     Personal history of malignant neoplasm of breast     Hypothyroidism, unspecified type       I certify that, based on my findings, the following services are medically necessary home health services: Nursing    My clinical findings support the need for the above services because: PARKINSON    Further, I certify that my clinical findings support that this patient is homebound (i.e. absences from home require considerable and taxing effort and are for medical reasons or [employer] services or infrequently or of short duration when for other reasons) because: PARKINSONI      Physician signature _______KEITH STYLES____________________________   December 13, 2018  Physician name: Keith Styles MD    Fax ([phone]) or scan/email ([email or web address]) this completed document to [facility] within 24 hours of the referral date.  Questions: [phone].          Medications     7am 11am 3pm 8pm 10pm      Acetaminophen 500mg RARE                  Alprazolam xanax 0.25mg STOPPED        Aspirin 81mg        1 (MAY STOP)         Carbidopa/levodopa 25/100 Sinemet  2 2 2 2         Cholecalciferol vitamin d2000 1                  Citalopram celexa 40mg 1                  Cyanocobalamin vitamin B12 1000mcg RAN OUT        Fluticasone flonase 50mcg/act nasall spray         Levothyroxine synthroid 50 mcg 1                  MEMANTINE NAMENDA 5MG         Nonformulary runs smoothly tea SMOOTH MOVE         Polyethylene glycol miralax           1/2-1CAP      Quetiapine 100mg seroquel           "   1      Quetiapine 25mg seroquel 1          1      Rivastigmine exelon 13.3mg/24 hr 24 hr patch 1        Ropinirole requip 1mg 1 1 1            Simvastatin zocor 20mg             1      Sumatriptan imitrex 25mg As needed                                                                      Coding statement:   Duration of  Services: patient care and care coordination was 25 minutes  Greater than 50% of this visit was spent in counseling and coordination of care.     Keith Styles MD     ______________________________________    Last visit date and details:     : 1945      VLADIMIR: 2018      REASON FOR VISIT: Parkinson's disease (PD) follow up.      HPI: Ms. Paige Mercado is a 72 year old right - handed  female who came to the [facility] neurology clinic accompanied by her friend, who is in the waiting area for a follow up visit.  She was last seen in the clinic on 3/15/2018 by Dr. Styles to establish care for PD.  Since then she has met with Zainab Sanches, PharmD & was started on Rivastigmine patch.      She was diagnosed with PD in Dec 2012.  She noticed that she was falling off the bike a few times that led her to see Dr. Mcghee.  She has been followed by Dr. Mcghee in [facility].  Due to driving distance she decided to switch providers.  She was seen by Dr. De Los Santos a couple of times.       Tremor is controlled.  She denies wearing off motor symptoms.  She is asking \"can you get rid of my tremor for good.\"  She exercises regularly.  She goes to the [employer] 3 times a week to exercise.       PD Medications 7 am 11 am 3 pm 8 pm 10 pm   Sinemet 25/100 mg  2 2 2 2      Ropinirole 1 mg 1 1 1                 She is not sure why she takes Quetiapine.  She denies hallucinations.  She sees a psychiatrist for anxiety.      She is on Rivastigmine.  She reports improvement in her memory.  She plans to stay on taking 9.5 mg dose until the pills are completed.  She has noticed added benefit when she increased " "her dose from 4.6 to 9.5 mg.  She is interested in increase the dose.  Denies side effects including dizziness, HA, or GI symptoms.       MEDICATIONS:           Medication Sig     acetaminophen (TYLENOL) 500 MG  Take 1 tablet (500 mg) by mouth every 4 hours as needed for mild pain     aspirin (ASPIRIN LOW DOSE) 81 MG  81mg tablet @ 7am     carbidopa-levodopa (SINEMET)  MG 2 tabs @ 7am, 11am, 3pm and 8pm     Cholecalciferol (VITAMIN D) 2000 UNITS 2000 units @ 7am     citalopram (CELEXA) 40 MG tablet 40 mg tablet by mouth @ 10pm     levothyroxine (SYNTHROID) 50 MCG  50mcg tablet by mouth @ 7am     lisinopril (PRINIVIL/ZESTRIL) 2.5 MG  2.5mg tablet by mouth @ 10pm     polyethylene glycol (MIRALAX/GLYCOLAX) Packet Take 17 g by mouth daily as needed for constipation     QUEtiapine (SEROQUEL) 100 MG  100mg tablet @ 10pm; takes with a 25mg tablet     QUEtiapine (SEROQUEL) 25 MG  1 tab @ 7am 1 tablet @ 10pm (along with 100mg at bedtime)     rivastigmine (EXELON) 9.5 MG/24HR 24 hr patch Place 1 patch onto the skin daily     rOPINIRole (REQUIP) 1 MG tablet 1mg tablet by mouth @ 7am, 11am and 3pm     simvastatin (ZOCOR) 20 MG tablet 20mg tablet by mouth @ 10pm     SUMAtriptan (IMITREX) 25 MG tablet Take 1 tablet (25 mg) by mouth at onset of headache for migraine           ALLERGIES: Ciprofloxacin; Sulfa drugs; and Xanax [alprazolam]      PHYSICAL EXAM:      VITAL SIGNS:  Blood pressure 136/80, pulse 89, temperature 97.8  F (36.6  C), temperature source Oral, resp. rate 24, height 1.6 m (5' 3\"), weight 61.1 kg (134 lb 12.8 oz), SpO2 99 %.  Body mass index is 23.88 kg/(m^2).      GENERAL:  Ms. Mercado is a pleasant  female who is well-groomed and well-developed sitting comfortably in the exam room without any distress.  Affect is appropriate.      MOVEMENT DISORDERS ASSESSMENT: (Last Sinemet was about 4 hrs ago)  Speech: Slight loss of expression and volume.  Facial Expression: Minimal hypomimia.  Rest Tremor: " Slight and infrequently present in Lt hand.  Rare in chin.   Action/Postural Tremor: Slight postural tremor bilaterally; no action tremor.    Rigidity: Slight in all extremities.   Finger Taps: Normal.   Hand opening & closing: Mild slowing bilaterally.   Hand pronation & supination: Mild slowing and reduction in amplitude bilaterally.   Leg Agility: Normal.   Toe Tapping:  Mild slowing and reduction in amplitude bilaterally.   Arising from chair - arms folded across chest: Normal.  Posture: Normal erect.  Gait: Walks slowly, with normal steps. No festination or propulsion.  Postural Stability: Retropulsion, but recovers unaided.  Body Bradykinesia: Minimal slowness.      ASSESSMENT:      1)  Parkinson's Disease:  Patient has a 6 year history of PD.  Motor symptoms are stable.    2)  Memory problems:  Improved on Rivastigmine.       PLAN:      1)  Will stay on the same dose of antiparkinsonian medications.       PD Medications 7 am 11 am 3 pm 8 pm   Sinemet 25/100 mg  2 2 2 2   Ropinirole 1 mg 1 1 1          __  Will continue exercising regularly.       2)  After completing Rivastigmine 9.5 mg patches, will increase dose to 13.3 mg.  If she has trouble with insurance, she'll call us.   Phone number given.  __  In Epic, it was noted that pt had 2 upcoming appointments at [facility] on 7/2.  Pt didn't know anything about it.  She was encouraged to call the clinic & find out.   Pt was called on 6/19/2018.  Apparently her daughter made the appointments.  Pt wasn't able to recall what her daughter told her regarding the appointments & what they were for.   __  At some point, since pt lives by herself, it might be reasonable to have her see OT to insure her safety to live independently.       Will return to our clinic in 3 months or sooner as needed.      The total time spent with the patient was 50 minutes, and greater than 50% of this time was spent in counseling and coordination of care.      ED Gibbons,   CNP  [facility]     Summary and Recommendations:       Parkinsonism           - discussed to make sure that her sinemet medication should be one hour before protein or two hours             after protein            - may need as needed sinemet for tremor  Mood disorder - anxiety she is relatively well managed with citalopram 40mg/day and seroquel 25mg in the morning and 125mg in the evening. She has not been on remeron or pimavanserin (nuplazid)  Driving issue  Cognitive issues                         - neuropsychological evaluation was ordered but she may not proceed wit th is              - she has not had a formal moca                        - she and her daughter was informed about the rivastigmine/exelon patch   Constipation -               - need to be more aggressive daily regimen - miralax or/and senokot as well as diet      Briefly discussed new treatments  rytary   nuplazid      Educational program  1st Saturday in June      She could meet with a pharmacist that works here - Zainab Sanches, Pharm D      She is doing an exercise program - she is walking in the summer and walks in the dome in [facility] [employer]  She does go to the parkinson Classes.       Signed up for dimple       Return to see Alyssa Santos in 3-4 months  May need to call daughter at the time of the visit to help with discussion of treatment  We did this today and it worked well as daughter is home school ing 2/5 kids.           Medications     7am 11am 3pm 8pm 10pm      Acetaminophen 500mg prn                  Aspirin 81mg 1                  Carbidopa/levodopa 25/100 Sinemet  2 2 2 2         Cholecalciferol vitamin d2000 1                  Citalopram celexa 40mg 1                  Levothyroxine synthroid 50 mcg 1                  Lisinopril prinivil/zestril 2.5mg             1      Polyethylene glycol miralax As needed                  Quetiapine 100mg seroquel             1      Quetiapine 25mg seroquel 1          1      Ropinirole  "requip 1mg 1 1 1            Simvastatin zocor 20mg             1      Sumatriptan imitrex 25mg As needed                                                                            PATIENT: Paige Mercado      : 1945      VLADIMIR: 2018      REASON FOR VISIT: Parkinson's disease (PD) follow up.      HPI: Ms. Paige Mercado is a 72 year old right - handed  female who came to the [facility] neurology clinic accompanied by her friend, who is in the waiting area for a follow up visit.  She was last seen in the clinic on 3/15/2018 by Dr. Styles to establish care for PD.  Since then she has met with Zainab Sanches, PharmD & was started on Rivastigmine patch.      She was diagnosed with PD in Dec 2012.  She noticed that she was falling off the bike a few times that led her to see Dr. Mcghee.  She has been followed by Dr. Mcghee in [facility].  Due to driving distance she decided to switch providers.  She was seen by Dr. De Los Santos a couple of times.       Tremor is controlled.  She denies wearing off motor symptoms.  She is asking \"can you get rid of my tremor for good.\"  She exercises regularly.  She goes to the [employer] 3 times a week to exercise.       PD Medications 7 am 11 am 3 pm 8 pm 10 pm   Sinemet 25/100 mg  2 2 2 2      Ropinirole 1 mg 1 1 1                 She is not sure why she takes Quetiapine.  She denies hallucinations.  She sees a psychiatrist for anxiety.      She is on Rivastigmine.  She reports improvement in her memory.  She plans to stay on taking 9.5 mg dose until the pills are completed.  She has noticed added benefit when she increased her dose from 4.6 to 9.5 mg.  She is interested in increase the dose.  Denies side effects including dizziness, HA, or GI symptoms.       MEDICATIONS:           Medication Sig     acetaminophen (TYLENOL) 500 MG  Take 1 tablet (500 mg) by mouth every 4 hours as needed for mild pain     aspirin (ASPIRIN LOW DOSE) 81 MG  81mg tablet @ 7am     " "carbidopa-levodopa (SINEMET)  MG 2 tabs @ 7am, 11am, 3pm and 8pm     Cholecalciferol (VITAMIN D) 2000 UNITS 2000 units @ 7am     citalopram (CELEXA) 40 MG tablet 40 mg tablet by mouth @ 10pm     levothyroxine (SYNTHROID) 50 MCG  50mcg tablet by mouth @ 7am     lisinopril (PRINIVIL/ZESTRIL) 2.5 MG  2.5mg tablet by mouth @ 10pm     polyethylene glycol (MIRALAX/GLYCOLAX) Packet Take 17 g by mouth daily as needed for constipation     QUEtiapine (SEROQUEL) 100 MG  100mg tablet @ 10pm; takes with a 25mg tablet     QUEtiapine (SEROQUEL) 25 MG  1 tab @ 7am 1 tablet @ 10pm (along with 100mg at bedtime)     rivastigmine (EXELON) 9.5 MG/24HR 24 hr patch Place 1 patch onto the skin daily     rOPINIRole (REQUIP) 1 MG tablet 1mg tablet by mouth @ 7am, 11am and 3pm     simvastatin (ZOCOR) 20 MG tablet 20mg tablet by mouth @ 10pm     SUMAtriptan (IMITREX) 25 MG tablet Take 1 tablet (25 mg) by mouth at onset of headache for migraine           ALLERGIES: Ciprofloxacin; Sulfa drugs; and Xanax [alprazolam]      PHYSICAL EXAM:      VITAL SIGNS:  Blood pressure 136/80, pulse 89, temperature 97.8  F (36.6  C), temperature source Oral, resp. rate 24, height 1.6 m (5' 3\"), weight 61.1 kg (134 lb 12.8 oz), SpO2 99 %.  Body mass index is 23.88 kg/(m^2).      GENERAL:  Ms. Mercado is a pleasant  female who is well-groomed and well-developed sitting comfortably in the exam room without any distress.  Affect is appropriate.      MOVEMENT DISORDERS ASSESSMENT: (Last Sinemet was about 4 hrs ago)  Speech: Slight loss of expression and volume.  Facial Expression: Minimal hypomimia.  Rest Tremor: Slight and infrequently present in Lt hand.  Rare in chin.   Action/Postural Tremor: Slight postural tremor bilaterally; no action tremor.    Rigidity: Slight in all extremities.   Finger Taps: Normal.   Hand opening & closing: Mild slowing bilaterally.   Hand pronation & supination: Mild slowing and reduction in amplitude bilaterally.   Leg " Agility: Normal.   Toe Tapping:  Mild slowing and reduction in amplitude bilaterally.   Arising from chair - arms folded across chest: Normal.  Posture: Normal erect.  Gait: Walks slowly, with normal steps. No festination or propulsion.  Postural Stability: Retropulsion, but recovers unaided.  Body Bradykinesia: Minimal slowness.      ASSESSMENT:      1)  Parkinson's Disease:  Patient has a 6 year history of PD.  Motor symptoms are stable.    2)  Memory problems:  Improved on Rivastigmine.       PLAN:      1)  Will stay on the same dose of antiparkinsonian medications.       PD Medications 7 am 11 am 3 pm 8 pm   Sinemet 25/100 mg  2 2 2 2   Ropinirole 1 mg 1 1 1          __  Will continue exercising regularly.       2)  After completing Rivastigmine 9.5 mg patches, will increase dose to 13.3 mg.  If she has trouble with insurance, she'll call us.   Phone number given.  __  In Epic, it was noted that pt had 2 upcoming appointments at [place] on 7/2.  Pt didn't know anything about it.  She was encouraged to call the clinic & find out.   Pt was called on 6/19/2018.  Apparently her daughter made the appointments.  Pt wasn't able to recall what her daughter told her regarding the appointments & what they were for.   __  At some point, since pt lives by herself, it might be reasonable to have her see OT to insure her safety to live independently.       Will return to our clinic in 3 months or sooner as needed.      The total time spent with the patient was 50 minutes, and greater than 50% of this time was spent in counseling and coordination of care.      ED Gibbons,  CNP  [facility]     Summary and Recommendations:       Parkinsonism           - discussed to make sure that her sinemet medication should be one hour before protein or two hours             after protein            - may need as needed sinemet for tremor  Mood disorder - anxiety she is relatively well managed with citalopram 40mg/day  and seroquel 25mg in the morning and 125mg in the evening. She has not been on remeron or pimavanserin (nuplazid)  Driving issue  Cognitive issues                         - neuropsychological evaluation was ordered but she may not proceed wit th is              - she has not had a formal moca                        - she and her daughter was informed about the rivastigmine/exelon patch   Constipation -               - need to be more aggressive daily regimen - miralax or/and senokot as well as diet      Briefly discussed new treatments  rytary   nuplazid      Educational program  1st Saturday in June      She could meet with a pharmacist that works here - Zainab Sanches, Pharm D      She is doing an exercise program - she is walking in the summer and walks in the dome in [facility] [employer]  She does go to the parkinson Classes.       Signed up for dimple       Return to see Alyssa Santos in 3-4 months  May need to call daughter at the time of the visit to help with discussion of treatment  We did this today and it worked well as daughter is home school ing 2/5 kids.           Medications     7am 11am 3pm 8pm 10pm      Acetaminophen 500mg prn                  Aspirin 81mg 1                  Carbidopa/levodopa 25/100 Sinemet  2 2 2 2         Cholecalciferol vitamin d2000 1                  Citalopram celexa 40mg 1                  Levothyroxine synthroid 50 mcg 1                  Lisinopril prinivil/zestril 2.5mg             1      Polyethylene glycol miralax As needed                  Quetiapine 100mg seroquel             1      Quetiapine 25mg seroquel 1          1      Ropinirole requip 1mg 1 1 1            Simvastatin zocor 20mg             1      Sumatriptan imitrex 25mg As needed                                                                             VLADIMIR: September 18, 2018     Parkinson     [place]     Patch has been working very well at the highest dose.   Things has gotten recently.      Constipation -  she is taking something for this  She is taking something from [place] called runs smoothly - tea bag and drinking more water.   She has good success      Bladder control is okay     No falls.   [place] exercise program 3-4 times per week for one week  Lives in [facility].   Using [employer] mobility  Rosalva daughter is not working and  - at [facility] with older   She is at home today        Medications     7am 11am 3pm 8pm 10pm      Acetaminophen 500mg prn                  Aspirin 81mg 1                  Carbidopa/levodopa 25/100 Sinemet  2 2 2 2         Cholecalciferol vitamin d 2000 units 1                  Citalopram celexa 40mg 1                  Cyanocobalamin vitamin B12 1000mcg tablets               Levothyroxine synthroid 50 mcg 1                  Lisinopril prinivil/zestril 2.5mg             1      nonformulary runs smoothly tea from [place]         1     Polyethylene glycol miralax As needed                  Quetiapine 100mg seroquel             1      Quetiapine 25mg seroquel 1          1      Rivastigmine exelon 13.3mg/24 hr patch 1             Ropinirole requip 1mg 1 1 1            Simvastatin zocor 20mg             1      Sumatriptan imitrex 25mg As needed                                                                             History obtained from patient      Having more problems with cognition  Started on b12 as was normal but on the low end of normal.      She has more left hand tremor when she is anxious.      PLAN  Dose increase in the seroquel at night to 150mg == 100mg + 2 x 25mg     Consider adding folic acid (folate) and vitamin b6 (pyridoxine) over the counter.      Return back in 3 months to see Alyssa Santos NP     Discussed using mindfulness or other strategies to calm the tremor   Will add an additional sinemet to be taken as needed for stressful related increased tremor.            Medications     7am 11am 3pm 8pm 10pm      Acetaminophen 500mg prn                  Aspirin 81mg 1                   Carbidopa/levodopa 25/100 Sinemet  2 2 2 2         Cholecalciferol vitamin d 2000 units 1                  Citalopram celexa 40mg 1                  Cyanocobalamin vitamin B12 1000mcg tablets 1             Levothyroxine synthroid 50 mcg 1                  Lisinopril prinivil/zestril 2.5mg             1      nonformulary runs smoothly tea from [place]         1     Polyethylene glycol miralax As needed                  Quetiapine 100mg seroquel             1      Quetiapine 25mg seroquel 1          2      Rivastigmine exelon 13.3mg/24 hr patch 1             Ropinirole requip 1mg 1 1 1            Simvastatin zocor 20mg             1      Sumatriptan imitrex 25mg As needed                                                                            ______________________________________      Patient was asked about 14 Review of systems including changes in vision (dry eyes, double vision), hearing, heart, lungs, musculoskeletal, depression, anxiety, snoring, RBD, insomnia, urinary frequency, urinary urgency, constipation, swallowing problems, hematological, ID, allergies, skin problems: seborrhea, endocrinological: thyroid, diabetes, cholesterol; balance, weight changes, and other neurological problems and these were not significant at this time except for   Patient Active Problem List   Diagnosis     Allergic rhinitis     Anxiety     Conversion disorder     Depression     Essential hypertension     Generalized anxiety disorder     H/O bone density study     HTN (hypertension), benign     Hyperlipidemia     Hyperplastic polyps of stomach     Hypothyroidism     Lumbago     Migraine     Osteoarthrosis     Parkinson disease (H)     Parkinson's disease (H)     Parotid mass     PD (Parkinson's disease) (H)     Pyelonephritis     Prediabetes     S/P colonoscopy     Warthin's tumor     Mild cognitive impairment     Personal history of malignant neoplasm of breast     Hypothyroidism, unspecified type          Allergies  "  Allergen Reactions     Ciprofloxacin GI Disturbance     Sulfa Drugs GI Disturbance     Xanax [Alprazolam] Other (See Comments)     \"zombie-like\" and confused     Past Surgical History:   Procedure Laterality Date     ARTHROSCOPY SHOULDER       EYE SURGERY Right     scheduled right cataract 1st may 2018     EYE SURGERY Left     scheduled left cataract may 2018     GYN SURGERY      hysterectomy - Partial?     HEMORRHOIDECTOMY       MASTECTOMY Right     breast cancer  2000 or so     PAROTIDECTOMY Left     [place] tumor 2012     TONSILLECTOMY      3 yrs of age     Past Medical History:   Diagnosis Date     Cancer (H)     breast     Social History     Social History     Marital status:      Spouse name: N/A     Number of children: N/A     Years of education: N/A     Occupational History     Not on file.     Social History Main Topics     Smoking status: Never Smoker     Smokeless tobacco: Never Used     Alcohol use No     Drug use: No     Sexual activity: No     Other Topics Concern     Parent/Sibling W/ Cabg, Mi Or Angioplasty Before 65f 55m? No     Social History Narrative    lives in [facility]    Ibis Bethea is daughter       Drug and lactation database from the [place] [employer]:  http://toxnet.nlm.nih.gov/cgi-bin/sis/htmlgen?LACT      B/P: Data Unavailable, T: Data Unavailable, P: Data Unavailable, R: Data Unavailable 0 lbs 0 oz  not currently breastfeeding., There is no height or weight on file to calculate BMI.  Medications and Vitals not listed above were documented in the cart and reviewed by me.     Current Outpatient Prescriptions   Medication Sig Dispense Refill     acetaminophen (TYLENOL) 500 MG tablet Take 1 tablet (500 mg) by mouth every 4 hours as needed for mild pain       aspirin (ASPIRIN LOW DOSE) 81 MG tablet 81mg tablet @ 7am 30 tablet      carbidopa-levodopa (SINEMET)  MG per tablet 2 tabs @ 7am, 11am, 3pm and 8pm and 1 tab as needed 810 tablet 3     " Cholecalciferol (VITAMIN D) 2000 UNITS CAPS 2000 units @ 7am 30 capsule      citalopram (CELEXA) 40 MG tablet 40 mg tablet by mouth @ 10pm 90 tablet      cyanocobalamin 1000 MCG TABS 1000mcg dissolvable tab by mouth daily @ 7am 30 tablet      levothyroxine (SYNTHROID) 50 MCG tablet 50mcg tablet by mouth @ 7am 90 tablet 3     lisinopril (PRINIVIL/ZESTRIL) 2.5 MG tablet 2.5mg tablet by mouth @ 10pm 90 tablet 3     NONFORMULARY Runs smoothly tea from [facility] between 9-10pm       polyethylene glycol (MIRALAX/GLYCOLAX) Packet Take 17 g by mouth daily as needed for constipation 7 packet      QUEtiapine (SEROQUEL) 100 MG tablet 100mg tablet @ 10pm; takes with a 25mg tablet 60 tablet 11     QUEtiapine (SEROQUEL) 25 MG tablet 1 tab @ 7am and 2 tabs @ 10pm (along with 100mg at bedtime) 270 tablet 3     rivastigmine (EXELON) 13.3 MG/24HR 24 hr patch Place 1 patch onto the skin daily 90 patch 3     rOPINIRole (REQUIP) 1 MG tablet 1mg tablet by mouth @ 7am, 11am and 3pm 270 tablet 3     simvastatin (ZOCOR) 20 MG tablet 20mg tablet by mouth @ 10pm 90 tablet 3     SUMAtriptan (IMITREX) 25 MG tablet Take 1 tablet (25 mg) by mouth at onset of headache for migraine 30 tablet          Keith Styles MD

## 2018-12-07 RX ORDER — LISINOPRIL 2.5 MG/1
7.5 TABLET ORAL
COMMUNITY
End: 2018-12-07

## 2018-12-07 RX ORDER — CARBIDOPA AND LEVODOPA 25; 100 MG/1; MG/1
1 TABLET, EXTENDED RELEASE ORAL AT BEDTIME
COMMUNITY
Start: 2016-02-10 | End: 2018-12-13

## 2018-12-07 RX ORDER — FLUTICASONE PROPIONATE 50 MCG
1 SPRAY, SUSPENSION (ML) NASAL
COMMUNITY
End: 2018-12-13

## 2018-12-07 RX ORDER — NEOMYCIN SULFATE, POLYMYXIN B SULFATE, AND DEXAMETHASONE 3.5; 10000; 1 MG/G; [USP'U]/G; MG/G
OINTMENT OPHTHALMIC
COMMUNITY
Start: 2018-11-06 | End: 2018-12-07

## 2018-12-07 RX ORDER — ALPRAZOLAM 0.25 MG
0.25 TABLET ORAL
COMMUNITY
End: 2019-02-25

## 2018-12-07 RX ORDER — ACETAMINOPHEN 500 MG
500 TABLET ORAL
COMMUNITY
End: 2018-12-07

## 2018-12-13 ENCOUNTER — OFFICE VISIT (OUTPATIENT)
Dept: PHARMACY | Facility: CLINIC | Age: 73
End: 2018-12-13
Payer: COMMERCIAL

## 2018-12-13 ENCOUNTER — OFFICE VISIT (OUTPATIENT)
Dept: NEUROLOGY | Facility: CLINIC | Age: 73
End: 2018-12-13
Payer: COMMERCIAL

## 2018-12-13 VITALS
BODY MASS INDEX: 24.1 KG/M2 | TEMPERATURE: 98 F | RESPIRATION RATE: 15 BRPM | DIASTOLIC BLOOD PRESSURE: 60 MMHG | HEIGHT: 63 IN | SYSTOLIC BLOOD PRESSURE: 146 MMHG | OXYGEN SATURATION: 96 % | WEIGHT: 136 LBS | HEART RATE: 93 BPM

## 2018-12-13 DIAGNOSIS — G24.4 OROFACIAL DYSKINESIA: ICD-10-CM

## 2018-12-13 DIAGNOSIS — G20.A1 PARKINSON'S DISEASE (H): Primary | ICD-10-CM

## 2018-12-13 DIAGNOSIS — G20.C PARKINSONISM, UNSPECIFIED PARKINSONISM TYPE (H): ICD-10-CM

## 2018-12-13 DIAGNOSIS — R41.3 MEMORY PROBLEM: ICD-10-CM

## 2018-12-13 DIAGNOSIS — I10 BENIGN ESSENTIAL HYPERTENSION: ICD-10-CM

## 2018-12-13 DIAGNOSIS — Z79.82 ASPIRIN LONG-TERM USE: ICD-10-CM

## 2018-12-13 PROCEDURE — 99606 MTMS BY PHARM EST 15 MIN: CPT | Performed by: PHARMACIST

## 2018-12-13 PROCEDURE — 99607 MTMS BY PHARM ADDL 15 MIN: CPT | Performed by: PHARMACIST

## 2018-12-13 RX ORDER — CITALOPRAM HYDROBROMIDE 40 MG/1
TABLET ORAL
Qty: 90 TABLET | Refills: 3 | COMMUNITY
Start: 2018-12-13 | End: 2019-10-29

## 2018-12-13 RX ORDER — RIVASTIGMINE 13.3 MG/24H
1 PATCH, EXTENDED RELEASE TRANSDERMAL DAILY
Qty: 90 PATCH | Refills: 3 | Status: SHIPPED | OUTPATIENT
Start: 2018-12-13 | End: 2019-06-11

## 2018-12-13 RX ORDER — CARBIDOPA AND LEVODOPA 25; 100 MG/1; MG/1
TABLET ORAL
Qty: 810 TABLET | Refills: 3 | Status: SHIPPED | OUTPATIENT
Start: 2018-12-13 | End: 2019-08-26

## 2018-12-13 RX ORDER — ROPINIROLE 1 MG/1
TABLET, FILM COATED ORAL
Qty: 270 TABLET | Refills: 3 | Status: SHIPPED | OUTPATIENT
Start: 2018-12-13 | End: 2019-06-11

## 2018-12-13 RX ORDER — POLYETHYLENE GLYCOL 3350 17 G/17G
POWDER, FOR SOLUTION ORAL
Qty: 7 PACKET | COMMUNITY
Start: 2018-12-13 | End: 2019-03-01

## 2018-12-13 RX ORDER — MEMANTINE HYDROCHLORIDE 5 MG/1
TABLET ORAL
Qty: 360 TABLET | Refills: 3 | Status: SHIPPED | OUTPATIENT
Start: 2018-12-13 | End: 2018-12-18

## 2018-12-13 ASSESSMENT — MIFFLIN-ST. JEOR: SCORE: 1087.21

## 2018-12-13 ASSESSMENT — PAIN SCALES - GENERAL: PAINLEVEL: NO PAIN (0)

## 2018-12-13 NOTE — LETTER
2018      RE: Paige Mercado   [address] [place] [address]       Diagnosis/Summary/Recommendations:    PATIENT: Paige Mercado  73 year old female     : 1945    VLADIMIR: 2018    Parkinsonism    Review medications      Medications     7am 11am 3pm 8pm 10pm      Acetaminophen 500mg prn                  Alprazolam xanax 0.25mg         Aspirin 81mg 1                  Carbidopa/levodopa Sinemet CR 25/100         Carbidopa/levodopa 25/100 Sinemet  2 2 2 2         Cholecalciferol vitamin d2000 1                  Cholecalciferol vitamin D 1000 units         Citalopram celexa 40mg 1                  Cyanocobalamin vitamin B12 1000mcg         Fluticasone flonase 50mcg/act nasall spray         Levothyroxine synthroid 50 mcg 1                  Lisinopril prinivil/zestril 2.5mg             1      Nonformulary runs smoothly tea from u         Polyethylene glycol miralax As needed                  Quetiapine 100mg seroquel             1      Quetiapine 25mg seroquel 1          1      Rivastigmine exelon 13.3mg/24 hr 24 hr patch         Ropinirole requip 1mg 1 1 1            Simvastatin zocor 20mg             1      Sumatriptan imitrex 25mg As needed                                                                    History obtained from patient      Coding statement:   Duration of  Services: patient care and care coordination was 25 minutes  Greater than 50% of this visit was spent in counseling and coordination of care.     Keith Styles MD     ______________________________________    Last visit date and details:     : 1945      VLADIMIR: 2018      REASON FOR VISIT: Parkinson's disease (PD) follow up.      HPI: Ms. Paige Mercado is a 72 year old right - handed  female who came to the [facility] neurology clinic accompanied by her friend, who is in the waiting area for a follow up visit.  She was last seen in the clinic on 3/15/2018 by Dr. Styles to establish care for PD.  " Since then she has met with Zainab Sanches, PharmD & was started on Rivastigmine patch.      She was diagnosed with PD in Dec 2012.  She noticed that she was falling off the bike a few times that led her to see Dr. Mcghee.  She has been followed by Dr. Mcghee in [facility].  Due to driving distance she decided to switch providers.  She was seen by Dr. De Los Santos a couple of times.       Tremor is controlled.  She denies wearing off motor symptoms.  She is asking \"can you get rid of my tremor for good.\"  She exercises regularly.  She goes to the [employer] 3 times a week to exercise.       PD Medications 7 am 11 am 3 pm 8 pm 10 pm   Sinemet 25/100 mg  2 2 2 2      Ropinirole 1 mg 1 1 1                 She is not sure why she takes Quetiapine.  She denies hallucinations.  She sees a psychiatrist for anxiety.      She is on Rivastigmine.  She reports improvement in her memory.  She plans to stay on taking 9.5 mg dose until the pills are completed.  She has noticed added benefit when she increased her dose from 4.6 to 9.5 mg.  She is interested in increase the dose.  Denies side effects including dizziness, HA, or GI symptoms.       MEDICATIONS:           Medication Sig     acetaminophen (TYLENOL) 500 MG  Take 1 tablet (500 mg) by mouth every 4 hours as needed for mild pain     aspirin (ASPIRIN LOW DOSE) 81 MG  81mg tablet @ 7am     carbidopa-levodopa (SINEMET)  MG 2 tabs @ 7am, 11am, 3pm and 8pm     Cholecalciferol (VITAMIN D) 2000 UNITS 2000 units @ 7am     citalopram (CELEXA) 40 MG tablet 40 mg tablet by mouth @ 10pm     levothyroxine (SYNTHROID) 50 MCG  50mcg tablet by mouth @ 7am     lisinopril (PRINIVIL/ZESTRIL) 2.5 MG  2.5mg tablet by mouth @ 10pm     polyethylene glycol (MIRALAX/GLYCOLAX) Packet Take 17 g by mouth daily as needed for constipation     QUEtiapine (SEROQUEL) 100 MG  100mg tablet @ 10pm; takes with a 25mg tablet     QUEtiapine (SEROQUEL) 25 MG  1 tab @ 7am 1 tablet @ 10pm (along with 100mg at " "bedtime)     rivastigmine (EXELON) 9.5 MG/24HR 24 hr patch Place 1 patch onto the skin daily     rOPINIRole (REQUIP) 1 MG tablet 1mg tablet by mouth @ 7am, 11am and 3pm     simvastatin (ZOCOR) 20 MG tablet 20mg tablet by mouth @ 10pm     SUMAtriptan (IMITREX) 25 MG tablet Take 1 tablet (25 mg) by mouth at onset of headache for migraine           ALLERGIES: Ciprofloxacin; Sulfa drugs; and Xanax [alprazolam]      PHYSICAL EXAM:      VITAL SIGNS:  Blood pressure 136/80, pulse 89, temperature 97.8  F (36.6  C), temperature source Oral, resp. rate 24, height 1.6 m (5' 3\"), weight 61.1 kg (134 lb 12.8 oz), SpO2 99 %.  Body mass index is 23.88 kg/(m^2).      GENERAL:  Ms. Mercado is a pleasant  female who is well-groomed and well-developed sitting comfortably in the exam room without any distress.  Affect is appropriate.      MOVEMENT DISORDERS ASSESSMENT: (Last Sinemet was about 4 hrs ago)  Speech: Slight loss of expression and volume.  Facial Expression: Minimal hypomimia.  Rest Tremor: Slight and infrequently present in Lt hand.  Rare in chin.   Action/Postural Tremor: Slight postural tremor bilaterally; no action tremor.    Rigidity: Slight in all extremities.   Finger Taps: Normal.   Hand opening & closing: Mild slowing bilaterally.   Hand pronation & supination: Mild slowing and reduction in amplitude bilaterally.   Leg Agility: Normal.   Toe Tapping:  Mild slowing and reduction in amplitude bilaterally.   Arising from chair - arms folded across chest: Normal.  Posture: Normal erect.  Gait: Walks slowly, with normal steps. No festination or propulsion.  Postural Stability: Retropulsion, but recovers unaided.  Body Bradykinesia: Minimal slowness.      ASSESSMENT:      1)  Parkinson's Disease:  Patient has a 6 year history of PD.  Motor symptoms are stable.    2)  Memory problems:  Improved on Rivastigmine.       PLAN:      1)  Will stay on the same dose of antiparkinsonian medications.       PD Medications 7 " am 11 am 3 pm 8 pm   Sinemet 25/100 mg  2 2 2 2   Ropinirole 1 mg 1 1 1          __  Will continue exercising regularly.       2)  After completing Rivastigmine 9.5 mg patches, will increase dose to 13.3 mg.  If she has trouble with insurance, she'll call us.   Phone number given.  __  In Epic, it was noted that pt had 2 upcoming appointments at [facility] on 7/2.  Pt didn't know anything about it.  She was encouraged to call the clinic & find out.   Pt was called on 6/19/2018.  Apparently her daughter made the appointments.  Pt wasn't able to recall what her daughter told her regarding the appointments & what they were for.   __  At some point, since pt lives by herself, it might be reasonable to have her see OT to insure her safety to live independently.       Will return to our clinic in 3 months or sooner as needed.      The total time spent with the patient was 50 minutes, and greater than 50% of this time was spent in counseling and coordination of care.      ED Gibbons,  CNP  [facility]     Summary and Recommendations:       Parkinsonism           - discussed to make sure that her sinemet medication should be one hour before protein or two hours             after protein            - may need as needed sinemet for tremor  Mood disorder - anxiety she is relatively well managed with citalopram 40mg/day and seroquel 25mg in the morning and 125mg in the evening. She has not been on remeron or pimavanserin (nuplazid)  Driving issue  Cognitive issues                         - neuropsychological evaluation was ordered but she may not proceed wit th is              - she has not had a formal moca                        - she and her daughter was informed about the rivastigmine/exelon patch   Constipation -               - need to be more aggressive daily regimen - miralax or/and senokot as well as diet      Briefly discussed new treatments  rytary   nuplazid      Educational program  1st Saturday  in       She could meet with a pharmacist that works here - Zainab Sanches, Pharm D      She is doing an exercise program - she is walking in the summer and walks in the dome in [facility] [employer]  She does go to the parkinson Classes.       Signed up for dimple       Return to see Alyssa Santos in 3-4 months  May need to call daughter at the time of the visit to help with discussion of treatment  We did this today and it worked well as daughter is home school ing 2/5 kids.           Medications     7am 11am 3pm 8pm 10pm      Acetaminophen 500mg prn                  Aspirin 81mg 1                  Carbidopa/levodopa 25/100 Sinemet  2 2 2 2         Cholecalciferol vitamin d2000 1                  Citalopram celexa 40mg 1                  Levothyroxine synthroid 50 mcg 1                  Lisinopril prinivil/zestril 2.5mg             1      Polyethylene glycol miralax As needed                  Quetiapine 100mg seroquel             1      Quetiapine 25mg seroquel 1          1      Ropinirole requip 1mg 1 1 1            Simvastatin zocor 20mg             1      Sumatriptan imitrex 25mg As needed                                                                            PATIENT: Paige Mercado      : 1945      VLADIMIR: 2018      REASON FOR VISIT: Parkinson's disease (PD) follow up.      HPI: Ms. Paige Mercado is a 72 year old right - handed  female who came to the [facility] neurology clinic accompanied by her friend, who is in the waiting area for a follow up visit.  She was last seen in the clinic on 3/15/2018 by Dr. Styles to establish care for PD.  Since then she has met with Zainab Sanches PharmD & was started on Rivastigmine patch.      She was diagnosed with PD in Dec 2012.  She noticed that she was falling off the bike a few times that led her to see Dr. Mcghee.  She has been followed by Dr. Mcghee in [facility].  Due to driving distance she decided to switch providers.  She was seen by Dr. Magali blake  "couple of times.       Tremor is controlled.  She denies wearing off motor symptoms.  She is asking \"can you get rid of my tremor for good.\"  She exercises regularly.  She goes to the [facility] 3 times a week to exercise.       PD Medications 7 am 11 am 3 pm 8 pm 10 pm   Sinemet 25/100 mg  2 2 2 2      Ropinirole 1 mg 1 1 1                 She is not sure why she takes Quetiapine.  She denies hallucinations.  She sees a psychiatrist for anxiety.      She is on Rivastigmine.  She reports improvement in her memory.  She plans to stay on taking 9.5 mg dose until the pills are completed.  She has noticed added benefit when she increased her dose from 4.6 to 9.5 mg.  She is interested in increase the dose.  Denies side effects including dizziness, HA, or GI symptoms.       MEDICATIONS:           Medication Sig     acetaminophen (TYLENOL) 500 MG  Take 1 tablet (500 mg) by mouth every 4 hours as needed for mild pain     aspirin (ASPIRIN LOW DOSE) 81 MG  81mg tablet @ 7am     carbidopa-levodopa (SINEMET)  MG 2 tabs @ 7am, 11am, 3pm and 8pm     Cholecalciferol (VITAMIN D) 2000 UNITS 2000 units @ 7am     citalopram (CELEXA) 40 MG tablet 40 mg tablet by mouth @ 10pm     levothyroxine (SYNTHROID) 50 MCG  50mcg tablet by mouth @ 7am     lisinopril (PRINIVIL/ZESTRIL) 2.5 MG  2.5mg tablet by mouth @ 10pm     polyethylene glycol (MIRALAX/GLYCOLAX) Packet Take 17 g by mouth daily as needed for constipation     QUEtiapine (SEROQUEL) 100 MG  100mg tablet @ 10pm; takes with a 25mg tablet     QUEtiapine (SEROQUEL) 25 MG  1 tab @ 7am 1 tablet @ 10pm (along with 100mg at bedtime)     rivastigmine (EXELON) 9.5 MG/24HR 24 hr patch Place 1 patch onto the skin daily     rOPINIRole (REQUIP) 1 MG tablet 1mg tablet by mouth @ 7am, 11am and 3pm     simvastatin (ZOCOR) 20 MG tablet 20mg tablet by mouth @ 10pm     SUMAtriptan (IMITREX) 25 MG tablet Take 1 tablet (25 mg) by mouth at onset of headache for migraine " "          ALLERGIES: Ciprofloxacin; Sulfa drugs; and Xanax [alprazolam]      PHYSICAL EXAM:      VITAL SIGNS:  Blood pressure 136/80, pulse 89, temperature 97.8  F (36.6  C), temperature source Oral, resp. rate 24, height 1.6 m (5' 3\"), weight 61.1 kg (134 lb 12.8 oz), SpO2 99 %.  Body mass index is 23.88 kg/(m^2).      GENERAL:  Ms. Mercado is a pleasant  female who is well-groomed and well-developed sitting comfortably in the exam room without any distress.  Affect is appropriate.      MOVEMENT DISORDERS ASSESSMENT: (Last Sinemet was about 4 hrs ago)  Speech: Slight loss of expression and volume.  Facial Expression: Minimal hypomimia.  Rest Tremor: Slight and infrequently present in Lt hand.  Rare in chin.   Action/Postural Tremor: Slight postural tremor bilaterally; no action tremor.    Rigidity: Slight in all extremities.   Finger Taps: Normal.   Hand opening & closing: Mild slowing bilaterally.   Hand pronation & supination: Mild slowing and reduction in amplitude bilaterally.   Leg Agility: Normal.   Toe Tapping:  Mild slowing and reduction in amplitude bilaterally.   Arising from chair - arms folded across chest: Normal.  Posture: Normal erect.  Gait: Walks slowly, with normal steps. No festination or propulsion.  Postural Stability: Retropulsion, but recovers unaided.  Body Bradykinesia: Minimal slowness.      ASSESSMENT:      1)  Parkinson's Disease:  Patient has a 6 year history of PD.  Motor symptoms are stable.    2)  Memory problems:  Improved on Rivastigmine.       PLAN:      1)  Will stay on the same dose of antiparkinsonian medications.       PD Medications 7 am 11 am 3 pm 8 pm   Sinemet 25/100 mg  2 2 2 2   Ropinirole 1 mg 1 1 1          __  Will continue exercising regularly.       2)  After completing Rivastigmine 9.5 mg patches, will increase dose to 13.3 mg.  If she has trouble with insurance, she'll call us.   Phone number given.  __  In Epic, it was noted that pt had 2 upcoming " appointments at [facility] on 7/2.  Pt didn't know anything about it.  She was encouraged to call the clinic & find out.   Pt was called on 6/19/2018.  Apparently her daughter made the appointments.  Pt wasn't able to recall what her daughter told her regarding the appointments & what they were for.   __  At some point, since pt lives by herself, it might be reasonable to have her see OT to insure her safety to live independently.       Will return to our clinic in 3 months or sooner as needed.      The total time spent with the patient was 50 minutes, and greater than 50% of this time was spent in counseling and coordination of care.      Alyssa Santos, ED,  CNP  [facility]     Summary and Recommendations:       Parkinsonism           - discussed to make sure that her sinemet medication should be one hour before protein or two hours             after protein            - may need as needed sinemet for tremor  Mood disorder - anxiety she is relatively well managed with citalopram 40mg/day and seroquel 25mg in the morning and 125mg in the evening. She has not been on remeron or pimavanserin (nuplazid)  Driving issue  Cognitive issues                         - neuropsychological evaluation was ordered but she may not proceed wit th is              - she has not had a formal moca                        - she and her daughter was informed about the rivastigmine/exelon patch   Constipation -               - need to be more aggressive daily regimen - miralax or/and senokot as well as diet      Briefly discussed new treatments  rytary   nuplazid      Educational program  1st Saturday in June      She could meet with a pharmacist that works here - Zainab Sanches, Pharm D      She is doing an exercise program - she is walking in the summer and walks in the dome in [facility] [employer]  She does go to the parkinson Classes.       Signed up for dimple       Return to see Alyssa Santos in 3-4 months  May need to call  daughter at the time of the visit to help with discussion of treatment  We did this today and it worked well as daughter is home school ing 2/5 kids.           Medications     7am 11am 3pm 8pm 10pm      Acetaminophen 500mg prn                  Aspirin 81mg 1                  Carbidopa/levodopa 25/100 Sinemet  2 2 2 2         Cholecalciferol vitamin d2000 1                  Citalopram celexa 40mg 1                  Levothyroxine synthroid 50 mcg 1                  Lisinopril prinivil/zestril 2.5mg             1      Polyethylene glycol miralax As needed                  Quetiapine 100mg seroquel             1      Quetiapine 25mg seroquel 1          1      Ropinirole requip 1mg 1 1 1            Simvastatin zocor 20mg             1      Sumatriptan imitrex 25mg As needed                                                                             VLADIMIR: September 18, 2018     Parkinson     Rosal[place]     Patch has been working very well at the highest dose.   Things has gotten recently.      Constipation - she is taking something for this  She is taking something from cub called runs smoothly - tea bag and drinking more water.   She has good success      Bladder control is okay     No falls.   [employer] exercise program 3-4 times per week for one week  Lives in [facility].   Using [employer]  Rosalva daughter is not working and  - at [facility] with older   She is at home today        Medications     7am 11am 3pm 8pm 10pm      Acetaminophen 500mg prn                  Aspirin 81mg 1                  Carbidopa/levodopa 25/100 Sinemet  2 2 2 2         Cholecalciferol vitamin d 2000 units 1                  Citalopram celexa 40mg 1                  Cyanocobalamin vitamin B12 1000mcg tablets               Levothyroxine synthroid 50 mcg 1                  Lisinopril prinivil/zestril 2.5mg             1      nonformulary runs smoothly tea from cub         1     Polyethylene glycol miralax As needed                   Quetiapine 100mg seroquel             1      Quetiapine 25mg seroquel 1          1      Rivastigmine exelon 13.3mg/24 hr patch 1             Ropinirole requip 1mg 1 1 1            Simvastatin zocor 20mg             1      Sumatriptan imitrex 25mg As needed                                                                             History obtained from patient      Having more problems with cognition  Started on b12 as was normal but on the low end of normal.      She has more left hand tremor when she is anxious.      PLAN  Dose increase in the seroquel at night to 150mg == 100mg + 2 x 25mg     Consider adding folic acid (folate) and vitamin b6 (pyridoxine) over the counter.      Return back in 3 months to see Alyssa Santos NP     Discussed using mindfulness or other strategies to calm the tremor   Will add an additional sinemet to be taken as needed for stressful related increased tremor.            Medications     7am 11am 3pm 8pm 10pm      Acetaminophen 500mg prn                  Aspirin 81mg 1                  Carbidopa/levodopa 25/100 Sinemet  2 2 2 2         Cholecalciferol vitamin d 2000 units 1                  Citalopram celexa 40mg 1                  Cyanocobalamin vitamin B12 1000mcg tablets 1             Levothyroxine synthroid 50 mcg 1                  Lisinopril prinivil/zestril 2.5mg             1      nonformulary runs smoothly tea from cub         1     Polyethylene glycol miralax As needed                  Quetiapine 100mg seroquel             1      Quetiapine 25mg seroquel 1          2      Rivastigmine exelon 13.3mg/24 hr patch 1             Ropinirole requip 1mg 1 1 1            Simvastatin zocor 20mg             1      Sumatriptan imitrex 25mg As needed                                                                            ______________________________________      Patient was asked about 14 Review of systems including changes in vision (dry eyes, double vision), hearing, heart, lungs,  "musculoskeletal, depression, anxiety, snoring, RBD, insomnia, urinary frequency, urinary urgency, constipation, swallowing problems, hematological, ID, allergies, skin problems: seborrhea, endocrinological: thyroid, diabetes, cholesterol; balance, weight changes, and other neurological problems and these were not significant at this time except for   Patient Active Problem List   Diagnosis     Allergic rhinitis     Anxiety     Conversion disorder     Depression     Essential hypertension     Generalized anxiety disorder     H/O bone density study     HTN (hypertension), benign     Hyperlipidemia     Hyperplastic polyps of stomach     Hypothyroidism     Lumbago     Migraine     Osteoarthrosis     Parkinson disease (H)     Parkinson's disease (H)     Parotid mass     PD (Parkinson's disease) (H)     Pyelonephritis     Prediabetes     S/P colonoscopy     Warthin's tumor     Mild cognitive impairment     Personal history of malignant neoplasm of breast     Hypothyroidism, unspecified type          Allergies   Allergen Reactions     Ciprofloxacin GI Disturbance     Sulfa Drugs GI Disturbance     Xanax [Alprazolam] Other (See Comments)     \"zombie-like\" and confused     Past Surgical History:   Procedure Laterality Date     ARTHROSCOPY SHOULDER       EYE SURGERY Right     scheduled right cataract 1st may 2018     EYE SURGERY Left     scheduled left cataract may 2018     GYN SURGERY      hysterectomy - Partial?     HEMORRHOIDECTOMY       MASTECTOMY Right     breast cancer  2000 or so     PAROTIDECTOMY Left     [place] tumor 2012     TONSILLECTOMY      3 yrs of age     Past Medical History:   Diagnosis Date     Cancer (H)     breast     Social History     Social History     Marital status:      Spouse name: N/A     Number of children: N/A     Years of education: N/A     Occupational History     Not on file.     Social History Main Topics     Smoking status: Never Smoker     Smokeless tobacco: Never Used     Alcohol " use No     Drug use: No     Sexual activity: No     Other Topics Concern     Parent/Sibling W/ Cabg, Mi Or Angioplasty Before 65f 55m? No     Social History Narrative    lives in [facility]    Ibis Bethea is daughter       Drug and lactation database from the [place] [employer] of Medicine:  http://toxnet.nlm.nih.gov/cgi-bin/sis/htmlgen?LACT      B/P: Data Unavailable, T: Data Unavailable, P: Data Unavailable, R: Data Unavailable 0 lbs 0 oz  not currently breastfeeding., There is no height or weight on file to calculate BMI.  Medications and Vitals not listed above were documented in the cart and reviewed by me.     Current Outpatient Prescriptions   Medication Sig Dispense Refill     acetaminophen (TYLENOL) 500 MG tablet Take 1 tablet (500 mg) by mouth every 4 hours as needed for mild pain       aspirin (ASPIRIN LOW DOSE) 81 MG tablet 81mg tablet @ 7am 30 tablet      carbidopa-levodopa (SINEMET)  MG per tablet 2 tabs @ 7am, 11am, 3pm and 8pm and 1 tab as needed 810 tablet 3     Cholecalciferol (VITAMIN D) 2000 UNITS CAPS 2000 units @ 7am 30 capsule      citalopram (CELEXA) 40 MG tablet 40 mg tablet by mouth @ 10pm 90 tablet      cyanocobalamin 1000 MCG TABS 1000mcg dissolvable tab by mouth daily @ 7am 30 tablet      levothyroxine (SYNTHROID) 50 MCG tablet 50mcg tablet by mouth @ 7am 90 tablet 3     lisinopril (PRINIVIL/ZESTRIL) 2.5 MG tablet 2.5mg tablet by mouth @ 10pm 90 tablet 3     NONFORMULARY Runs smoothly tea from cub between 9-10pm       polyethylene glycol (MIRALAX/GLYCOLAX) Packet Take 17 g by mouth daily as needed for constipation 7 packet      QUEtiapine (SEROQUEL) 100 MG tablet 100mg tablet @ 10pm; takes with a 25mg tablet 60 tablet 11     QUEtiapine (SEROQUEL) 25 MG tablet 1 tab @ 7am and 2 tabs @ 10pm (along with 100mg at bedtime) 270 tablet 3     rivastigmine (EXELON) 13.3 MG/24HR 24 hr patch Place 1 patch onto the skin daily 90 patch 3     rOPINIRole (REQUIP) 1 MG tablet  1mg tablet by mouth @ 7am, 11am and 3pm 270 tablet 3     simvastatin (ZOCOR) 20 MG tablet 20mg tablet by mouth @ 10pm 90 tablet 3     SUMAtriptan (IMITREX) 25 MG tablet Take 1 tablet (25 mg) by mouth at onset of headache for migraine 30 tablet          Keith Styles MD

## 2018-12-13 NOTE — PATIENT INSTRUCTIONS
Medications     7am 11am 3pm 8pm 10pm      Acetaminophen 500mg RARE                  Alprazolam xanax 0.25mg STOPPED        Aspirin 81mg        1 (MAY STOP)         Carbidopa/levodopa 25/100 Sinemet  2 2 2 2         Cholecalciferol vitamin d2000 1                  Citalopram celexa 40mg 1                  Cyanocobalamin vitamin B12 1000mcg RAN OUT        Fluticasone flonase 50mcg/act nasall spray         Levothyroxine synthroid 50 mcg 1                  MEMANTINE NAMENDA 5MG         Nonformulary runs smoothly tea SMOOTH MOVE         Polyethylene glycol miralax           1/2-1CAP      Quetiapine 100mg seroquel             1      Quetiapine 25mg seroquel 1          1      Rivastigmine exelon 13.3mg/24 hr 24 hr patch 1        Ropinirole requip 1mg 1 1 1            Simvastatin zocor 20mg             1      Sumatriptan imitrex 25mg As needed

## 2018-12-13 NOTE — Clinical Note
"2018       RE: Paige Mercado   [address] [place] [address]     Dear Colleague,    Thank you for referring your patient, Paige Mercado, to the [facility] NEUROLOGY at [facility]. Please see a copy of my visit note below.    Diagnosis/Summary/Recommendations:    PATIENT: Paige Mercado  73 year old female     : 1945    VLADIMIR: 2018    ***    Medications                                                                                                                                                History obtained from {PATIENT. FAMILY:[id number]}      Coding statement:   Duration of  Services: patient care and care coordination was *** minutes  Greater than 50% of this visit was spent in counseling and coordination of care.     Keith Styles MD     ______________________________________    Last visit date and details:      PATIENT: Paige Mercado      : 1945      VLADIMIR: 2018      REASON FOR VISIT: Parkinson's disease (PD) follow up.      HPI: Ms. Paige Mercado is a 72 year old right - handed  female who came to the [facility] neurology clinic accompanied by her friend, who is in the waiting area for a follow up visit.  She was last seen in the clinic on 3/15/2018 by Dr. Styles to establish care for PD.  Since then she has met with Zainab Sanches, PharmD & was started on Rivastigmine patch.      She was diagnosed with PD in Dec 2012.  She noticed that she was falling off the bike a few times that led her to see Dr. Mcghee.  She has been followed by Dr. Mcghee in [facility].  Due to driving distance she decided to switch providers.  She was seen by Dr. De Los Santos a couple of times.       Tremor is controlled.  She denies wearing off motor symptoms.  She is asking \"can you get rid of my tremor for good.\"  She exercises regularly.  She goes to the [facility] 3 times a week to exercise.       PD Medications 7 am 11 am 3 pm 8 pm 10 pm " "  Sinemet 25/100 mg  2 2 2 2      Ropinirole 1 mg 1 1 1                 She is not sure why she takes Quetiapine.  She denies hallucinations.  She sees a psychiatrist for anxiety.      She is on Rivastigmine.  She reports improvement in her memory.  She plans to stay on taking 9.5 mg dose until the pills are completed.  She has noticed added benefit when she increased her dose from 4.6 to 9.5 mg.  She is interested in increase the dose.  Denies side effects including dizziness, HA, or GI symptoms.       MEDICATIONS:           Medication Sig     acetaminophen (TYLENOL) 500 MG  Take 1 tablet (500 mg) by mouth every 4 hours as needed for mild pain     aspirin (ASPIRIN LOW DOSE) 81 MG  81mg tablet @ 7am     carbidopa-levodopa (SINEMET)  MG 2 tabs @ 7am, 11am, 3pm and 8pm     Cholecalciferol (VITAMIN D) 2000 UNITS 2000 units @ 7am     citalopram (CELEXA) 40 MG tablet 40 mg tablet by mouth @ 10pm     levothyroxine (SYNTHROID) 50 MCG  50mcg tablet by mouth @ 7am     lisinopril (PRINIVIL/ZESTRIL) 2.5 MG  2.5mg tablet by mouth @ 10pm     polyethylene glycol (MIRALAX/GLYCOLAX) Packet Take 17 g by mouth daily as needed for constipation     QUEtiapine (SEROQUEL) 100 MG  100mg tablet @ 10pm; takes with a 25mg tablet     QUEtiapine (SEROQUEL) 25 MG  1 tab @ 7am 1 tablet @ 10pm (along with 100mg at bedtime)     rivastigmine (EXELON) 9.5 MG/24HR 24 hr patch Place 1 patch onto the skin daily     rOPINIRole (REQUIP) 1 MG tablet 1mg tablet by mouth @ 7am, 11am and 3pm     simvastatin (ZOCOR) 20 MG tablet 20mg tablet by mouth @ 10pm     SUMAtriptan (IMITREX) 25 MG tablet Take 1 tablet (25 mg) by mouth at onset of headache for migraine           ALLERGIES: Ciprofloxacin; Sulfa drugs; and Xanax [alprazolam]      PHYSICAL EXAM:      VITAL SIGNS:  Blood pressure 136/80, pulse 89, temperature 97.8  F (36.6  C), temperature source Oral, resp. rate 24, height 1.6 m (5' 3\"), weight 61.1 kg (134 lb 12.8 oz), SpO2 99 %.  Body mass index is " 23.88 kg/(m^2).      GENERAL:  Ms. Mercado is a pleasant  female who is well-groomed and well-developed sitting comfortably in the exam room without any distress.  Affect is appropriate.      MOVEMENT DISORDERS ASSESSMENT: (Last Sinemet was about 4 hrs ago)  Speech: Slight loss of expression and volume.  Facial Expression: Minimal hypomimia.  Rest Tremor: Slight and infrequently present in Lt hand.  Rare in chin.   Action/Postural Tremor: Slight postural tremor bilaterally; no action tremor.    Rigidity: Slight in all extremities.   Finger Taps: Normal.   Hand opening & closing: Mild slowing bilaterally.   Hand pronation & supination: Mild slowing and reduction in amplitude bilaterally.   Leg Agility: Normal.   Toe Tapping:  Mild slowing and reduction in amplitude bilaterally.   Arising from chair - arms folded across chest: Normal.  Posture: Normal erect.  Gait: Walks slowly, with normal steps. No festination or propulsion.  Postural Stability: Retropulsion, but recovers unaided.  Body Bradykinesia: Minimal slowness.      ASSESSMENT:      1)  Parkinson's Disease:  Patient has a 6 year history of PD.  Motor symptoms are stable.    2)  Memory problems:  Improved on Rivastigmine.       PLAN:      1)  Will stay on the same dose of antiparkinsonian medications.       PD Medications 7 am 11 am 3 pm 8 pm   Sinemet 25/100 mg  2 2 2 2   Ropinirole 1 mg 1 1 1          __  Will continue exercising regularly.       2)  After completing Rivastigmine 9.5 mg patches, will increase dose to 13.3 mg.  If she has trouble with insurance, she'll call us.   Phone number given.  __  In Epic, it was noted that pt had 2 upcoming appointments at [place] on 7/2.  Pt didn't know anything about it.  She was encouraged to call the clinic & find out.   Pt was called on 6/19/2018.  Apparently her daughter made the appointments.  Pt wasn't able to recall what her daughter told her regarding the appointments & what they were for.   __  At  some point, since pt lives by herself, it might be reasonable to have her see OT to insure her safety to live independently.       Will return to our clinic in 3 months or sooner as needed.      The total time spent with the patient was 50 minutes, and greater than 50% of this time was spent in counseling and coordination of care.      ED Gibbons,  CNP  [facility]     Summary and Recommendations:       Parkinsonism           - discussed to make sure that her sinemet medication should be one hour before protein or two hours             after protein            - may need as needed sinemet for tremor  Mood disorder - anxiety she is relatively well managed with citalopram 40mg/day and seroquel 25mg in the morning and 125mg in the evening. She has not been on remeron or pimavanserin (nuplazid)  Driving issue  Cognitive issues                         - neuropsychological evaluation was ordered but she may not proceed wit th is              - she has not had a formal moca                        - she and her daughter was informed about the rivastigmine/exelon patch   Constipation -               - need to be more aggressive daily regimen - miralax or/and senokot as well as diet      Briefly discussed new treatments  rytary   nuplazid      Educational program  1st Saturday in June      She could meet with a pharmacist that works here - Zainab Snaches, Pharm D      She is doing an exercise program - she is walking in the summer and walks in the dome in [facility] [employer]  She does go to the parkinson Classes.       Signed up for dimple       Return to see Alyssa Santos in 3-4 months  May need to call daughter at the time of the visit to help with discussion of treatment  We did this today and it worked well as daughter is home school ing 2/5 kids.           Medications     7am 11am 3pm 8pm 10pm      Acetaminophen 500mg prn                  Aspirin 81mg 1                  Carbidopa/levodopa 25/100 Sinemet  2 2  2 2         Cholecalciferol vitamin d2000 1                  Citalopram celexa 40mg 1                  Levothyroxine synthroid 50 mcg 1                  Lisinopril prinivil/zestril 2.5mg             1      Polyethylene glycol miralax As needed                  Quetiapine 100mg seroquel             1      Quetiapine 25mg seroquel 1          1      Ropinirole requip 1mg 1 1 1            Simvastatin zocor 20mg             1      Sumatriptan imitrex 25mg As needed                                                                             VLADIMIR: September 18, 2018     Parkinson     Rosalva FARLEY'Alexis     Patch has been working very well at the highest dose.   Things has gotten recently.      Constipation - she is taking something for this  She is taking something from cub called runs smoothly - tea bag and drinking more water.   She has good success      Bladder control is okay     No falls.   [place] exercise program 3-4 times per week for one week  Lives in [facility].   Using [employer]  Rosalva daughter is not working and  - at [facility] with older   She is at home today        Medications     7am 11am 3pm 8pm 10pm      Acetaminophen 500mg prn                  Aspirin 81mg 1                  Carbidopa/levodopa 25/100 Sinemet  2 2 2 2         Cholecalciferol vitamin d 2000 units 1                  Citalopram celexa 40mg 1                  Cyanocobalamin vitamin B12 1000mcg tablets               Levothyroxine synthroid 50 mcg 1                  Lisinopril prinivil/zestril 2.5mg             1      nonformulary runs smoothly tea from cub         1     Polyethylene glycol miralax As needed                  Quetiapine 100mg seroquel             1      Quetiapine 25mg seroquel 1          1      Rivastigmine exelon 13.3mg/24 hr patch 1             Ropinirole requip 1mg 1 1 1            Simvastatin zocor 20mg             1      Sumatriptan imitrex 25mg As needed                                                                              History obtained from patient      Having more problems with cognition  Started on b12 as was normal but on the low end of normal.      She has more left hand tremor when she is anxious.      PLAN  Dose increase in the seroquel at night to 150mg == 100mg + 2 x 25mg     Consider adding folic acid (folate) and vitamin b6 (pyridoxine) over the counter.      Return back in 3 months to see Alyssa Santos NP     Discussed using mindfulness or other strategies to calm the tremor   Will add an additional sinemet to be taken as needed for stressful related increased tremor.            Medications     7am 11am 3pm 8pm 10pm      Acetaminophen 500mg prn                  Aspirin 81mg 1                  Carbidopa/levodopa 25/100 Sinemet  2 2 2 2         Cholecalciferol vitamin d 2000 units 1                  Citalopram celexa 40mg 1                  Cyanocobalamin vitamin B12 1000mcg tablets 1             Levothyroxine synthroid 50 mcg 1                  Lisinopril prinivil/zestril 2.5mg             1      nonformulary runs smoothly tea from cub         1     Polyethylene glycol miralax As needed                  Quetiapine 100mg seroquel             1      Quetiapine 25mg seroquel 1          2      Rivastigmine exelon 13.3mg/24 hr patch 1             Ropinirole requip 1mg 1 1 1            Simvastatin zocor 20mg             1      Sumatriptan imitrex 25mg As needed                                                                            ______________________________________      Patient was asked about 14 Review of systems including changes in vision (dry eyes, double vision), hearing, heart, lungs, musculoskeletal, depression, anxiety, snoring, RBD, insomnia, urinary frequency, urinary urgency, constipation, swallowing problems, hematological, ID, allergies, skin problems: seborrhea, endocrinological: thyroid, diabetes, cholesterol; balance, weight changes, and other neurological problems and these were not  "significant at this time except for   Patient Active Problem List   Diagnosis     Allergic rhinitis     Anxiety     Conversion disorder     Depression     Essential hypertension     Generalized anxiety disorder     H/O bone density study     HTN (hypertension), benign     Hyperlipidemia     Hyperplastic polyps of stomach     Hypothyroidism     Lumbago     Migraine     Osteoarthrosis     Parkinson disease (H)     Parkinson's disease (H)     Parotid mass     PD (Parkinson's disease) (H)     Pyelonephritis     Prediabetes     S/P colonoscopy     Warthin's tumor     Mild cognitive impairment     Personal history of malignant neoplasm of breast     Hypothyroidism, unspecified type          Allergies   Allergen Reactions     Ciprofloxacin GI Disturbance     Sulfa Drugs GI Disturbance     Xanax [Alprazolam] Other (See Comments)     \"zombie-like\" and confused     Past Surgical History:   Procedure Laterality Date     ARTHROSCOPY SHOULDER       EYE SURGERY Right     scheduled right cataract 1st may 2018     EYE SURGERY Left     scheduled left cataract may 2018     GYN SURGERY      hysterectomy - Partial?     HEMORRHOIDECTOMY       MASTECTOMY Right     breast cancer  2000 or so     PAROTIDECTOMY Left     [place] tumor 2012     TONSILLECTOMY      3 yrs of age     Past Medical History:   Diagnosis Date     Cancer (H)     breast     Social History     Social History     Marital status:      Spouse name: N/A     Number of children: N/A     Years of education: N/A     Occupational History     Not on file.     Social History Main Topics     Smoking status: Never Smoker     Smokeless tobacco: Never Used     Alcohol use No     Drug use: No     Sexual activity: No     Other Topics Concern     Parent/Sibling W/ Cabg, Mi Or Angioplasty Before 65f 55m? No     Social History Narrative    lives in [facility]    [place] is daughter       Drug and lactation database from the [employer] of " Medicine:  http://toxnet.nlm.nih.gov/cgi-bin/sis/htmlgen?LACT      B/P: Data Unavailable, T: Data Unavailable, P: Data Unavailable, R: Data Unavailable 0 lbs 0 oz  not currently breastfeeding., There is no height or weight on file to calculate BMI.  Medications and Vitals not listed above were documented in the cart and reviewed by me.     Current Outpatient Prescriptions   Medication Sig Dispense Refill     acetaminophen (TYLENOL) 500 MG tablet Take 1 tablet (500 mg) by mouth every 4 hours as needed for mild pain       aspirin (ASPIRIN LOW DOSE) 81 MG tablet 81mg tablet @ 7am 30 tablet      carbidopa-levodopa (SINEMET)  MG per tablet 2 tabs @ 7am, 11am, 3pm and 8pm and 1 tab as needed 810 tablet 3     Cholecalciferol (VITAMIN D) 2000 UNITS CAPS 2000 units @ 7am 30 capsule      citalopram (CELEXA) 40 MG tablet 40 mg tablet by mouth @ 10pm 90 tablet      cyanocobalamin 1000 MCG TABS 1000mcg dissolvable tab by mouth daily @ 7am 30 tablet      levothyroxine (SYNTHROID) 50 MCG tablet 50mcg tablet by mouth @ 7am 90 tablet 3     lisinopril (PRINIVIL/ZESTRIL) 2.5 MG tablet 2.5mg tablet by mouth @ 10pm 90 tablet 3     NONFORMULARY Runs smoothly tea from cub between 9-10pm       polyethylene glycol (MIRALAX/GLYCOLAX) Packet Take 17 g by mouth daily as needed for constipation 7 packet      QUEtiapine (SEROQUEL) 100 MG tablet 100mg tablet @ 10pm; takes with a 25mg tablet 60 tablet 11     QUEtiapine (SEROQUEL) 25 MG tablet 1 tab @ 7am and 2 tabs @ 10pm (along with 100mg at bedtime) 270 tablet 3     rivastigmine (EXELON) 13.3 MG/24HR 24 hr patch Place 1 patch onto the skin daily 90 patch 3     rOPINIRole (REQUIP) 1 MG tablet 1mg tablet by mouth @ 7am, 11am and 3pm 270 tablet 3     simvastatin (ZOCOR) 20 MG tablet 20mg tablet by mouth @ 10pm 90 tablet 3     SUMAtriptan (IMITREX) 25 MG tablet Take 1 tablet (25 mg) by mouth at onset of headache for migraine 30 tablet          Keith Styles MD    Again, thank you for allowing me  to participate in the care of your patient.      Sincerely,    Keith Styles MD

## 2018-12-13 NOTE — PATIENT INSTRUCTIONS
Recommendations from today's MTM visit:                                                      1. Take your carbidopa-levodopa one hour before eating your meals, as below.    7 am - pill  8 am - breakfast  11 am - pill  12 pm - lunch  3 pm - pill  4-5 pm - dinner  8 pm - pill    2. You could stop taking the aspirin.    3. Start taking Miralax one-half capful a day every day.     Next MTM visit: TBD    To schedule another MTM appointment, please call the clinic directly or you may call the MTM scheduling line at [phone] or toll-free at [phone].     My Clinical Pharmacist's contact information:                                                      It was a pleasure talking with you today!  Please feel free to contact me with any questions or concerns you have.      Zainab Sanches, Pharm.D.  Medication Therapy Management Pharmacist  Phone: [phone]    You may receive a survey about the MTM services you received.  I would appreciate your feedback to help me serve you better in the future. Please fill it out and return it when you can. Your comments will be anonymous.

## 2018-12-13 NOTE — LETTER
2018      RE: Paige Mercado   [address] [place] [address]           Diagnosis/Summary/Recommendations:    PATIENT: Paige Mercado  73 year old female     : 1945    VLADIMIR: 2018    Parkinsonism    Medications     7am 11am 3pm 8pm 10pm      Acetaminophen 500mg RARE                  Alprazolam xanax 0.25mg STOPPED        Aspirin 81mg        1 (MAY STOP)         Carbidopa/levodopa Sinemet CR 25/100 NOT Taking        Carbidopa/levodopa 25/100 Sinemet  2 2 2 2         Cholecalciferol vitamin d2000 1                  Citalopram celexa 40mg 1                  Cyanocobalamin vitamin B12 1000mcg RAN OUT        Fluticasone flonase 50mcg/act nasall spray         Levothyroxine synthroid 50 mcg 1                  Lisinopril prinivil/zestril 2.5mg  not taking          1      Nonformulary runs smoothly tea SMOOTH MOVE         Polyethylene glycol miralax           1/2-1CAP      Quetiapine 100mg seroquel             1      Quetiapine 25mg seroquel 1          1      Rivastigmine exelon 13.3mg/24 hr 24 hr patch 1        Ropinirole requip 1mg 1 1 1            Simvastatin zocor 20mg             1      Sumatriptan imitrex 25mg As needed                                                                    History obtained from patient    PLAN  NAMENDA TRIAL  ?BOTOX FOR OROFACIAL DYSKINESIA  COLD MEDICATION FOR PARKINSON  DOSING ERRORS   HOME HEALTH ISSUES.                 Documentation of a Face-to-Face Physician Encounter 2018    Paige Mercado  1945  [id number]    I certify that this patient is under my care and that I, or a nurse practitioner or physician's assistant working with me, had a face-to-face encounter that meets the physician face-to-face encounter requirements with this patient on: 2018.    The encounter with the patient was in whole, or in part, for the following medical condition, which is the primary reason for home health care:  Patient Active Problem List    Diagnosis     Allergic rhinitis     Anxiety     Conversion disorder     Depression     Essential hypertension     Generalized anxiety disorder     H/O bone density study     HTN (hypertension), benign     Hyperlipidemia     Hyperplastic polyps of stomach     Hypothyroidism     Lumbago     Migraine     Osteoarthrosis     Parkinson disease (H)     Parkinson's disease (H)     Parotid mass     PD (Parkinson's disease) (H)     Pyelonephritis     Prediabetes     S/P colonoscopy     Warthin's tumor     Mild cognitive impairment     Personal history of malignant neoplasm of breast     Hypothyroidism, unspecified type       I certify that, based on my findings, the following services are medically necessary home health services: Nursing    My clinical findings support the need for the above services because: PARKINSON    Further, I certify that my clinical findings support that this patient is homebound (i.e. absences from home require considerable and taxing effort and are for medical reasons or [facility] services or infrequently or of short duration when for other reasons) because: PARKINSONI      Physician signature _______KEITH STYLES____________________________   December 13, 2018  Physician name: Keith Styles MD    Fax ([phone]) or scan/email ([email or web address]) this completed document to [facility] within 24 hours of the referral date.  Questions: [phone].          Medications     7am 11am 3pm 8pm 10pm      Acetaminophen 500mg RARE                  Alprazolam xanax 0.25mg STOPPED        Aspirin 81mg        1 (MAY STOP)         Carbidopa/levodopa 25/100 Sinemet  2 2 2 2         Cholecalciferol vitamin d2000 1                  Citalopram celexa 40mg 1                  Cyanocobalamin vitamin B12 1000mcg RAN OUT        Fluticasone flonase 50mcg/act nasall spray         Levothyroxine synthroid 50 mcg 1                  MEMANTINE NAMENDA 5MG         Nonformulary runs smoothly tea SMOOTH MOVE        "  Polyethylene glycol miralax           1/2-1CAP      Quetiapine 100mg seroquel             1      Quetiapine 25mg seroquel 1          1      Rivastigmine exelon 13.3mg/24 hr 24 hr patch 1        Ropinirole requip 1mg 1 1 1            Simvastatin zocor 20mg             1      Sumatriptan imitrex 25mg As needed                                                                      Coding statement:   Duration of  Services: patient care and care coordination was 25 minutes  Greater than 50% of this visit was spent in counseling and coordination of care.     Keith Styles MD     ______________________________________    Last visit date and details:     : 1945      VLADIMIR: 2018      REASON FOR VISIT: Parkinson's disease (PD) follow up.      HPI: Ms. Paige Mercado is a 72 year old right - handed  female who came to the [facility] neurology clinic accompanied by her friend, who is in the waiting area for a follow up visit.  She was last seen in the clinic on 3/15/2018 by Dr. Styles to establish care for PD.  Since then she has met with Zainab Sanches, PharmD & was started on Rivastigmine patch.      She was diagnosed with PD in Dec 2012.  She noticed that she was falling off the bike a few times that led her to see Dr. Mcghee.  She has been followed by Dr. Mcghee in [facility].  Due to driving distance she decided to switch providers.  She was seen by Dr. De Los Santos a couple of times.       Tremor is controlled.  She denies wearing off motor symptoms.  She is asking \"can you get rid of my tremor for good.\"  She exercises regularly.  She [place] the [employer] 3 times a week to exercise.       PD Medications 7 am 11 am 3 pm 8 pm 10 pm   Sinemet 25/100 mg  2 2 2 2      Ropinirole 1 mg 1 1 1                 She is not sure why she takes Quetiapine.  She denies hallucinations.  She sees a psychiatrist for anxiety.      She is on Rivastigmine.  She reports improvement in her memory.  She plans to stay on taking 9.5 mg " "dose until the pills are completed.  She has noticed added benefit when she increased her dose from 4.6 to 9.5 mg.  She is interested in increase the dose.  Denies side effects including dizziness, HA, or GI symptoms.       MEDICATIONS:           Medication Sig     acetaminophen (TYLENOL) 500 MG  Take 1 tablet (500 mg) by mouth every 4 hours as needed for mild pain     aspirin (ASPIRIN LOW DOSE) 81 MG  81mg tablet @ 7am     carbidopa-levodopa (SINEMET)  MG 2 tabs @ 7am, 11am, 3pm and 8pm     Cholecalciferol (VITAMIN D) 2000 UNITS 2000 units @ 7am     citalopram (CELEXA) 40 MG tablet 40 mg tablet by mouth @ 10pm     levothyroxine (SYNTHROID) 50 MCG  50mcg tablet by mouth @ 7am     lisinopril (PRINIVIL/ZESTRIL) 2.5 MG  2.5mg tablet by mouth @ 10pm     polyethylene glycol (MIRALAX/GLYCOLAX) Packet Take 17 g by mouth daily as needed for constipation     QUEtiapine (SEROQUEL) 100 MG  100mg tablet @ 10pm; takes with a 25mg tablet     QUEtiapine (SEROQUEL) 25 MG  1 tab @ 7am 1 tablet @ 10pm (along with 100mg at bedtime)     rivastigmine (EXELON) 9.5 MG/24HR 24 hr patch Place 1 patch onto the skin daily     rOPINIRole (REQUIP) 1 MG tablet 1mg tablet by mouth @ 7am, 11am and 3pm     simvastatin (ZOCOR) 20 MG tablet 20mg tablet by mouth @ 10pm     SUMAtriptan (IMITREX) 25 MG tablet Take 1 tablet (25 mg) by mouth at onset of headache for migraine           ALLERGIES: Ciprofloxacin; Sulfa drugs; and Xanax [alprazolam]      PHYSICAL EXAM:      VITAL SIGNS:  Blood pressure 136/80, pulse 89, temperature 97.8  F (36.6  C), temperature source Oral, resp. rate 24, height 1.6 m (5' 3\"), weight 61.1 kg (134 lb 12.8 oz), SpO2 99 %.  Body mass index is 23.88 kg/(m^2).      GENERAL:  Ms. Mercado is a pleasant  female who is well-groomed and well-developed sitting comfortably in the exam room without any distress.  Affect is appropriate.      MOVEMENT DISORDERS ASSESSMENT: (Last Sinemet was about 4 hrs ago)  Speech: Slight " loss of expression and volume.  Facial Expression: Minimal hypomimia.  Rest Tremor: Slight and infrequently present in Lt hand.  Rare in chin.   Action/Postural Tremor: Slight postural tremor bilaterally; no action tremor.    Rigidity: Slight in all extremities.   Finger Taps: Normal.   Hand opening & closing: Mild slowing bilaterally.   Hand pronation & supination: Mild slowing and reduction in amplitude bilaterally.   Leg Agility: Normal.   Toe Tapping:  Mild slowing and reduction in amplitude bilaterally.   Arising from chair - arms folded across chest: Normal.  Posture: Normal erect.  Gait: Walks slowly, with normal steps. No festination or propulsion.  Postural Stability: Retropulsion, but recovers unaided.  Body Bradykinesia: Minimal slowness.      ASSESSMENT:      1)  Parkinson's Disease:  Patient has a 6 year history of PD.  Motor symptoms are stable.    2)  Memory problems:  Improved on Rivastigmine.       PLAN:      1)  Will stay on the same dose of antiparkinsonian medications.       PD Medications 7 am 11 am 3 pm 8 pm   Sinemet 25/100 mg  2 2 2 2   Ropinirole 1 mg 1 1 1          __  Will continue exercising regularly.       2)  After completing Rivastigmine 9.5 mg patches, will increase dose to 13.3 mg.  If she has trouble with insurance, she'll call us.   Phone number given.  __  In Epic, it was noted that pt had 2 upcoming appointments at [facility] on 7/2.  Pt didn't know anything about it.  She was encouraged to call the clinic & find out.   Pt was called on 6/19/2018.  Apparently her daughter made the appointments.  Pt wasn't able to recall what her daughter told her regarding the appointments & what they were for.   __  At some point, since pt lives by herself, it might be reasonable to have her see OT to insure her safety to live independently.       Will return to our clinic in 3 months or sooner as needed.      The total time spent with the patient was 50 minutes, and greater than 50% of this time  was spent in counseling and coordination of care.      ED Gibbons,  CNP  [facility]     Summary and Recommendations:       Parkinsonism           - discussed to make sure that her sinemet medication should be one hour before protein or two hours             after protein            - may need as needed sinemet for tremor  Mood disorder - anxiety she is relatively well managed with citalopram 40mg/day and seroquel 25mg in the morning and 125mg in the evening. She has not been on remeron or pimavanserin (nuplazid)  Driving issue  Cognitive issues                         - neuropsychological evaluation was ordered but she may not proceed wit th is              - she has not had a formal moca                        - she and her daughter was informed about the rivastigmine/exelon patch   Constipation -               - need to be more aggressive daily regimen - miralax or/and senokot as well as diet      Briefly discussed new treatments  rytary   nuplazid      Educational program  1st Saturday in June      She could meet with a pharmacist that works here - Zainab Sanches, Pharm D      She is doing an exercise program - she is walking in the summer and walks in the dome in [facility] [employer]  She does go to the parkinson Classes.       Signed up for dimple       Return to see Alyssa Santos in 3-4 months  May need to call daughter at the time of the visit to help with discussion of treatment  We did this today and it worked well as daughter is home school ing 2/5 kids.           Medications     7am 11am 3pm 8pm 10pm      Acetaminophen 500mg prn                  Aspirin 81mg 1                  Carbidopa/levodopa 25/100 Sinemet  2 2 2 2         Cholecalciferol vitamin d2000 1                  Citalopram celexa 40mg 1                  Levothyroxine synthroid 50 mcg 1                  Lisinopril prinivil/zestril 2.5mg             1      Polyethylene glycol miralax As needed                  Quetiapine 100mg  "seroquel             1      Quetiapine 25mg seroquel 1          1      Ropinirole requip 1mg 1 1 1            Simvastatin zocor 20mg             1      Sumatriptan imitrex 25mg As needed                                                                            PATIENT: aPige Mercado      : 1945      VLADIMIR: 2018      REASON FOR VISIT: Parkinson's disease (PD) follow up.      HPI: Ms. Paige Mercado is a 72 year old right - handed  female who came to the [facility] neurology clinic accompanied by her friend, who is in the waiting area for a follow up visit.  She was last seen in the clinic on 3/15/2018 by Dr. Styles to establish care for PD.  Since then she has met with Zainab Sanches, PharmD & was started on Rivastigmine patch.      She was diagnosed with PD in Dec 2012.  She noticed that she was falling off the bike a few times that led her to see Dr. Mcghee.  She has been followed by Dr. Mcghee in [facility].  Due to driving distance she decided to switch providers.  She was seen by Dr. De Los Santos a couple of times.       Tremor is controlled.  She denies wearing off motor symptoms.  She is asking \"can you get rid of my tremor for good.\"  She exercises regularly.  She goes to the [facility] 3 times a week to exercise.       PD Medications 7 am 11 am 3 pm 8 pm 10 pm   Sinemet 25/100 mg  2 2 2 2      Ropinirole 1 mg 1 1 1                 She is not sure why she takes Quetiapine.  She denies hallucinations.  She sees a psychiatrist for anxiety.      She is on Rivastigmine.  She reports improvement in her memory.  She plans to stay on taking 9.5 mg dose until the pills are completed.  She has noticed added benefit when she increased her dose from 4.6 to 9.5 mg.  She is interested in increase the dose.  Denies side effects including dizziness, HA, or GI symptoms.       MEDICATIONS:           Medication Sig     acetaminophen (TYLENOL) 500 MG  Take 1 tablet (500 mg) by mouth every 4 hours as needed for " "mild pain     aspirin (ASPIRIN LOW DOSE) 81 MG  81mg tablet @ 7am     carbidopa-levodopa (SINEMET)  MG 2 tabs @ 7am, 11am, 3pm and 8pm     Cholecalciferol (VITAMIN D) 2000 UNITS 2000 units @ 7am     citalopram (CELEXA) 40 MG tablet 40 mg tablet by mouth @ 10pm     levothyroxine (SYNTHROID) 50 MCG  50mcg tablet by mouth @ 7am     lisinopril (PRINIVIL/ZESTRIL) 2.5 MG  2.5mg tablet by mouth @ 10pm     polyethylene glycol (MIRALAX/GLYCOLAX) Packet Take 17 g by mouth daily as needed for constipation     QUEtiapine (SEROQUEL) 100 MG  100mg tablet @ 10pm; takes with a 25mg tablet     QUEtiapine (SEROQUEL) 25 MG  1 tab @ 7am 1 tablet @ 10pm (along with 100mg at bedtime)     rivastigmine (EXELON) 9.5 MG/24HR 24 hr patch Place 1 patch onto the skin daily     rOPINIRole (REQUIP) 1 MG tablet 1mg tablet by mouth @ 7am, 11am and 3pm     simvastatin (ZOCOR) 20 MG tablet 20mg tablet by mouth @ 10pm     SUMAtriptan (IMITREX) 25 MG tablet Take 1 tablet (25 mg) by mouth at onset of headache for migraine           ALLERGIES: Ciprofloxacin; Sulfa drugs; and Xanax [alprazolam]      PHYSICAL EXAM:      VITAL SIGNS:  Blood pressure 136/80, pulse 89, temperature 97.8  F (36.6  C), temperature source Oral, resp. rate 24, height 1.6 m (5' 3\"), weight 61.1 kg (134 lb 12.8 oz), SpO2 99 %.  Body mass index is 23.88 kg/(m^2).      GENERAL:  Ms. Mercado is a pleasant  female who is well-groomed and well-developed sitting comfortably in the exam room without any distress.  Affect is appropriate.      MOVEMENT DISORDERS ASSESSMENT: (Last Sinemet was about 4 hrs ago)  Speech: Slight loss of expression and volume.  Facial Expression: Minimal hypomimia.  Rest Tremor: Slight and infrequently present in Lt hand.  Rare in chin.   Action/Postural Tremor: Slight postural tremor bilaterally; no action tremor.    Rigidity: Slight in all extremities.   Finger Taps: Normal.   Hand opening & closing: Mild slowing bilaterally.   Hand pronation " & supination: Mild slowing and reduction in amplitude bilaterally.   Leg Agility: Normal.   Toe Tapping:  Mild slowing and reduction in amplitude bilaterally.   Arising from chair - arms folded across chest: Normal.  Posture: Normal erect.  Gait: Walks slowly, with normal steps. No festination or propulsion.  Postural Stability: Retropulsion, but recovers unaided.  Body Bradykinesia: Minimal slowness.      ASSESSMENT:      1)  Parkinson's Disease:  Patient has a 6 year history of PD.  Motor symptoms are stable.    2)  Memory problems:  Improved on Rivastigmine.       PLAN:      1)  Will stay on the same dose of antiparkinsonian medications.       PD Medications 7 am 11 am 3 pm 8 pm   Sinemet 25/100 mg  2 2 2 2   Ropinirole 1 mg 1 1 1          __  Will continue exercising regularly.       2)  After completing Rivastigmine 9.5 mg patches, will increase dose to 13.3 mg.  If she has trouble with insurance, she'll call us.   Phone number given.  __  In Epic, it was noted that pt had 2 upcoming appointments at [facility] on 7/2.  Pt didn't know anything about it.  She was encouraged to call the clinic & find out.   Pt was called on 6/19/2018.  Apparently her daughter made the appointments.  Pt wasn't able to recall what her daughter told her regarding the appointments & what they were for.   __  At some point, since pt lives by herself, it might be reasonable to have her see OT to insure her safety to live independently.       Will return to our clinic in 3 months or sooner as needed.      The total time spent with the patient was 50 minutes, and greater than 50% of this time was spent in counseling and coordination of care.      Alyssa Santos, APRN,  CNP  [facility]     Summary and Recommendations:       Parkinsonism           - discussed to make sure that her sinemet medication should be one hour before protein or two hours             after protein            - may need as needed sinemet for tremor  Mood  disorder - anxiety she is relatively well managed with citalopram 40mg/day and seroquel 25mg in the morning and 125mg in the evening. She has not been on remeron or pimavanserin (nuplazid)  Driving issue  Cognitive issues                         - neuropsychological evaluation was ordered but she may not proceed wit th is              - she has not had a formal moca                        - she and her daughter was informed about the rivastigmine/exelon patch   Constipation -               - need to be more aggressive daily regimen - miralax or/and senokot as well as diet      Briefly discussed new treatments  rytary   nuplazid      Educational program  1st Saturday in June      She could meet with a pharmacist that works here - Zainab Sanches, Pharm D      She is doing an exercise program - she is walking in the summer and walks in the dome in [facility] [employer]  She does go to the parkinson Classes.       Signed up for dimple       Return to see Alyssa Santos in 3-4 months  May need to call daughter at the time of the visit to help with discussion of treatment  We did this today and it worked well as daughter is home school ing 2/5 kids.           Medications     7am 11am 3pm 8pm 10pm      Acetaminophen 500mg prn                  Aspirin 81mg 1                  Carbidopa/levodopa 25/100 Sinemet  2 2 2 2         Cholecalciferol vitamin d2000 1                  Citalopram celexa 40mg 1                  Levothyroxine synthroid 50 mcg 1                  Lisinopril prinivil/zestril 2.5mg             1      Polyethylene glycol miralax As needed                  Quetiapine 100mg seroquel             1      Quetiapine 25mg seroquel 1          1      Ropinirole requip 1mg 1 1 1            Simvastatin zocor 20mg             1      Sumatriptan imitrex 25mg As needed                                                                             VLADIMIR: September 18, 2018     Parkinson     Rosalva FARLEY'Alexis     Patch has been working very  well at the highest dose.   Things has gotten recently.      Constipation - she is taking something for this  She is taking something from [employer] called runs smoothly - tea bag and drinking more water.   She has good success      Bladder control is okay     No falls.   [place] exercise program 3-4 times per week for one week  Lives in [facility].   Using met[employer] mobility  Rosalva daughter is not working and  - at [facility] with older   She is at home today        Medications     7am 11am 3pm 8pm 10pm      Acetaminophen 500mg prn                  Aspirin 81mg 1                  Carbidopa/levodopa 25/100 Sinemet  2 2 2 2         Cholecalciferol vitamin d 2000 units 1                  Citalopram celexa 40mg 1                  Cyanocobalamin vitamin B12 1000mcg tablets               Levothyroxine synthroid 50 mcg 1                  Lisinopril prinivil/zestril 2.5mg             1      nonformulary runs smoothly tea from [employer]         1     Polyethylene glycol miralax As needed                  Quetiapine 100mg seroquel             1      Quetiapine 25mg seroquel 1          1      Rivastigmine exelon 13.3mg/24 hr patch 1             Ropinirole requip 1mg 1 1 1            Simvastatin zocor 20mg             1      Sumatriptan imitrex 25mg As needed                                                                             History obtained from patient      Having more problems with cognition  Started on b12 as was normal but on the low end of normal.      She has more left hand tremor when she is anxious.      PLAN  Dose increase in the seroquel at night to 150mg == 100mg + 2 x 25mg     Consider adding folic acid (folate) and vitamin b6 (pyridoxine) over the counter.      Return back in 3 months to see Alyssa Santos NP     Discussed using mindfulness or other strategies to calm the tremor   Will add an additional sinemet to be taken as needed for stressful related increased tremor.            Medications     7am 11am 3pm 8pm  10pm      Acetaminophen 500mg prn                  Aspirin 81mg 1                  Carbidopa/levodopa 25/100 Sinemet  2 2 2 2         Cholecalciferol vitamin d 2000 units 1                  Citalopram celexa 40mg 1                  Cyanocobalamin vitamin B12 1000mcg tablets 1             Levothyroxine synthroid 50 mcg 1                  Lisinopril prinivil/zestril 2.5mg             1      nonformulary runs smoothly tea from [place]         1     Polyethylene glycol miralax As needed                  Quetiapine 100mg seroquel             1      Quetiapine 25mg seroquel 1          2      Rivastigmine exelon 13.3mg/24 hr patch 1             Ropinirole requip 1mg 1 1 1            Simvastatin zocor 20mg             1      Sumatriptan imitrex 25mg As needed                                                                            ______________________________________      Patient was asked about 14 Review of systems including changes in vision (dry eyes, double vision), hearing, heart, lungs, musculoskeletal, depression, anxiety, snoring, RBD, insomnia, urinary frequency, urinary urgency, constipation, swallowing problems, hematological, ID, allergies, skin problems: seborrhea, endocrinological: thyroid, diabetes, cholesterol; balance, weight changes, and other neurological problems and these were not significant at this time except for   Patient Active Problem List   Diagnosis     Allergic rhinitis     Anxiety     Conversion disorder     Depression     Essential hypertension     Generalized anxiety disorder     H/O bone density study     HTN (hypertension), benign     Hyperlipidemia     Hyperplastic polyps of stomach     Hypothyroidism     Lumbago     Migraine     Osteoarthrosis     Parkinson disease (H)     Parkinson's disease (H)     Parotid mass     PD (Parkinson's disease) (H)     Pyelonephritis     Prediabetes     S/P colonoscopy     Warthin's tumor     Mild cognitive impairment     Personal history of malignant  "neoplasm of breast     Hypothyroidism, unspecified type          Allergies   Allergen Reactions     Ciprofloxacin GI Disturbance     Sulfa Drugs GI Disturbance     Xanax [Alprazolam] Other (See Comments)     \"zombie-like\" and confused     Past Surgical History:   Procedure Laterality Date     ARTHROSCOPY SHOULDER       EYE SURGERY Right     scheduled right cataract 1st may 2018     EYE SURGERY Left     scheduled left cataract may 2018     GYN SURGERY      hysterectomy - Partial?     HEMORRHOIDECTOMY       MASTECTOMY Right     breast cancer  2000 or so     PAROTIDECTOMY Left     [place] tumor 2012     TONSILLECTOMY      3 yrs of age     Past Medical History:   Diagnosis Date     Cancer (H)     breast     Social History     Social History     Marital status:      Spouse name: N/A     Number of children: N/A     Years of education: N/A     Occupational History     Not on file.     Social History Main Topics     Smoking status: Never Smoker     Smokeless tobacco: Never Used     Alcohol use No     Drug use: No     Sexual activity: No     Other Topics Concern     Parent/Sibling W/ Cabg, Mi Or Angioplasty Before 65f 55m? No     Social History Narrative    lives in [facility]    Ibis Bethea is daughter       Drug and lactation database from the [place] National [employer] of Medicine:  http://toxnet.nlm.nih.gov/cgi-bin/sis/htmlgen?LACT      B/P: Data Unavailable, T: Data Unavailable, P: Data Unavailable, R: Data Unavailable 0 lbs 0 oz  not currently breastfeeding., There is no height or weight on file to calculate BMI.  Medications and Vitals not listed above were documented in the cart and reviewed by me.     Current Outpatient Prescriptions   Medication Sig Dispense Refill     acetaminophen (TYLENOL) 500 MG tablet Take 1 tablet (500 mg) by mouth every 4 hours as needed for mild pain       aspirin (ASPIRIN LOW DOSE) 81 MG tablet 81mg tablet @ 7am 30 tablet      carbidopa-levodopa (SINEMET)  MG per " tablet 2 tabs @ 7am, 11am, 3pm and 8pm and 1 tab as needed 810 tablet 3     Cholecalciferol (VITAMIN D) 2000 UNITS CAPS 2000 units @ 7am 30 capsule      citalopram (CELEXA) 40 MG tablet 40 mg tablet by mouth @ 10pm 90 tablet      cyanocobalamin 1000 MCG TABS 1000mcg dissolvable tab by mouth daily @ 7am 30 tablet      levothyroxine (SYNTHROID) 50 MCG tablet 50mcg tablet by mouth @ 7am 90 tablet 3     lisinopril (PRINIVIL/ZESTRIL) 2.5 MG tablet 2.5mg tablet by mouth @ 10pm 90 tablet 3     NONFORMULARY Runs smoothly tea from cub between 9-10pm       polyethylene glycol (MIRALAX/GLYCOLAX) Packet Take 17 g by mouth daily as needed for constipation 7 packet      QUEtiapine (SEROQUEL) 100 MG tablet 100mg tablet @ 10pm; takes with a 25mg tablet 60 tablet 11     QUEtiapine (SEROQUEL) 25 MG tablet 1 tab @ 7am and 2 tabs @ 10pm (along with 100mg at bedtime) 270 tablet 3     rivastigmine (EXELON) 13.3 MG/24HR 24 hr patch Place 1 patch onto the skin daily 90 patch 3     rOPINIRole (REQUIP) 1 MG tablet 1mg tablet by mouth @ 7am, 11am and 3pm 270 tablet 3     simvastatin (ZOCOR) 20 MG tablet 20mg tablet by mouth @ 10pm 90 tablet 3     SUMAtriptan (IMITREX) 25 MG tablet Take 1 tablet (25 mg) by mouth at onset of headache for migraine 30 tablet          Keith Styles MD

## 2018-12-13 NOTE — NURSING NOTE
Chief Complaint   Patient presents with     RECHECK     P RETURN MOVEMENT DISORDER     Parkinson     Pollo Umana, EMT

## 2018-12-13 NOTE — PROGRESS NOTES
SUBJECTIVE/OBJECTIVE:                Paige Mercado is a 73 year old female coming in for a follow-up visit for Medication Therapy Management.  She was referred to me from Dr. Styles and is seeing him today in clinic. Patient's daughter, Rosalva, is also present for the visit.      Chief Complaint: Follow up from our visit on 9/18/18  Tobacco: No tobacco use  Alcohol: Less than 1 beverage / month    Medication Adherence/Access:  Patient uses pill box(es); her sister sets up her medications.   Patient takes medications 5 time(s) per day.   Per patient, misses medication a few times per week, or takes duplicate doses of medications.   Medication barriers: confusion about medications, which she has already taken that day  The patient fills medications at [facility]: NO, fills medications at [facility] mail order for all medications except Exelon, which is at [facility].     Parkinson's Disease/dementia:  Current medications include: carbidopa-levodopa  mg 2 tablets at 7 am, 11 am, 3 pm, and 8 pm and 1 tablet as needed and ropinirole 1 mg 3 times/day. Patient asked about the food interaction with levodopa. She applies rivastigmine 13.3 mg patch every day and states that she continues to have word finding issues and repeats herself. This then worsens her anxiety, which can worsen her tremor as well. She has an occasional twitch in her arm or leg, which she states is not too bothersome for her; she is concerned about having dyskinesia in the future.     Hypertension: Current medications include: none.  Patient does self-monitor BP. Home BP monitoring in range of 135-140's systolic over 63-67's diastolic.  Patient reports no side effects right now but previously was having lightheadedness when standing, so lisinopril was discontinued.    Constipation: Takes Miralax one capful as needed. States that constipation is an issue for her.     ASCVD prevention: Takes aspirin 81 mg daily. Denies history of MI/stroke. Her PCP has told  her she can stop taking aspirin but she has continued taking it and would like to know if she should stop it.    Today's vitals:  BP Readings from Last 1 Encounters:   12/13/18 146/60     Pulse Readings from Last 1 Encounters:   12/13/18 93       ASSESSMENT:              Current medications were reviewed today as discussed above.      Medication Adherence: good, needs improvement - discussed that she may qualify for home care nursing (discussed with Dr. Styles)    Parkinson's Disease/dementia: Needs improvement.  Educated patient to take her carbidopa-levodopa 1 hour before meals and 2 hours after meals and we will not make an adjustment to her dose at this time.    Hypertension: Stable.  Blood pressure is okay given her age and concomitant Parkinson's disease.    Constipation: Needs improvement.  Patient may benefit from taking a low-dose of MiraLAX on a daily basis rather than as needed.    ASCVD prevention: He is improvement.  I again discussed that patient may benefit from stopping aspirin given her age and increased risk for bleeding events with little potential efficacy from the medication.      PLAN:                  1. Start MiraLAX one half capful daily.  2. Take carbidopa-levodopa one hour before meals or two hours after meals.   3. Consider stopping aspirin.  4. Patient received paperwork for home care nursing per Dr. Styles.     I spent 40 minutes with this patient today. I offer these suggestions for consideration by Dr. Styles. A copy of the visit note was provided to the patient's referring provider.     Will follow up in 3 months or sooner if needed.    The patient was given a summary of these recommendations as an after visit summary.    Zainab Sanches, Pharm.D.  Medication Therapy Management Pharmacist  Phone: [phone]

## 2018-12-18 ENCOUNTER — MYC REFILL (OUTPATIENT)
Dept: NEUROLOGY | Facility: CLINIC | Age: 73
End: 2018-12-18

## 2018-12-18 ENCOUNTER — TELEPHONE (OUTPATIENT)
Dept: NEUROLOGY | Facility: CLINIC | Age: 73
End: 2018-12-18

## 2018-12-18 DIAGNOSIS — G20.A1 PARKINSON'S DISEASE (H): ICD-10-CM

## 2018-12-18 DIAGNOSIS — R41.3 MEMORY PROBLEM: ICD-10-CM

## 2018-12-18 RX ORDER — MEMANTINE HYDROCHLORIDE 5 MG/1
TABLET ORAL
Qty: 360 TABLET | Refills: 3 | Status: SHIPPED | OUTPATIENT
Start: 2018-12-18 | End: 2019-03-01

## 2018-12-18 NOTE — TELEPHONE ENCOUNTER
memantine (NAMENDA) 5 MG tablet 360 tablet 3 2018  No   SiMG/DAY X 1WK THEN 5MG TWICE DAILY X 1 WEEK THEN 5MG IN AM AND 10MG IN PM X 1WK THEN 10MG TWICE DAILY       Prior Authorization Retail Medication Request    Medication/Dose: See above  ICD code (if different than what is on RX):  Parkinson's disease G20. Memory problem R41.3  Previously Tried and Failed:  Carbidopa/levodopa  mg, rivastigmine 13.3 mg 24HR patch, Ropinirole 1 mg,  Quetiapine 25 mg, quetiapine 100 mg  Rationale:  Needed to help control the symptoms of Parkinson's disease and help with memory problems.    Insurance Name:  [employer]  Insurance ID:  [id number]       Pharmacy Information (if different than what is on RX)  [facility]  Phone:  [phone]

## 2018-12-18 NOTE — TELEPHONE ENCOUNTER
memantine (NAMENDA) 5 MG tablet 360 tablet 3 2018  --   SiMG/DAY X 1WK THEN 5MG TWICE DAILY X 1 WEEK THEN 5MG IN AM AND 10MG IN PM X 1WK THEN 10MG TWICE DAILY     Does no need a refill, refilled on 2018 with 3 refills.

## 2018-12-19 NOTE — TELEPHONE ENCOUNTER
Central Prior Authorization Team   [phone]    PA Initiation    Medication: memantine (NAMENDA) 5 MG tablet  Insurance Company: [employer] - Phone [phone] Fax [phone]  Pharmacy Filling the Rx[facility]  Filling Pharmacy Phone: [phone]  Filling Pharmacy Fax:    Start Date: 12/19/2018

## 2018-12-19 NOTE — TELEPHONE ENCOUNTER
Prior Authorization Approval    Authorization Effective Date: 12/19/2018  Authorization Expiration Date: 3/19/2019  Medication: memantine (NAMENDA) 5 MG tablet-PA APPROVED  Approved Dose/Quantity:  Reference #:    Insurance Company: [employer] - Phone [phone] Fax [phone]  Expected CoPay:       CoPay Card Available:      Foundation Assistance Needed:    Which Pharmacy is filling the prescription (Not needed for infusion/clinic administered): [facility] - [address]  Pharmacy Notified: No  Patient Notified: YesComment:  left message

## 2018-12-19 NOTE — TELEPHONE ENCOUNTER
memantine (NAMENDA) 5 MG tablet 360 tablet 3 2018  No   SiMG/DAY X 1WK THEN 5MG TWICE DAILY X 1 WEEK THEN 5MG IN AM AND 10MG IN PM X 1WK THEN 10MG TWICE DAILY     Prescription faxed to [facility] pharmacy.

## 2018-12-20 ENCOUNTER — TELEPHONE (OUTPATIENT)
Dept: PHARMACY | Facility: CLINIC | Age: 73
End: 2018-12-20

## 2018-12-20 NOTE — TELEPHONE ENCOUNTER
Called [facility] regarding memantine prescription. It was approved by insurance and for 90 days will cost $8. [facility] is planning to ship the medication to the patient.    Responded to [employer] message to explain this to the patient.    Zainab Sanches, Pharm.D.  Medication Therapy Management Pharmacist  Phone: [phone]

## 2018-12-20 NOTE — TELEPHONE ENCOUNTER
memantine (NAMENDA) 5 MG tablet 360 tablet 3 2018  No   SiMG/DAY X 1WK THEN 5MG TWICE DAILY X 1 WEEK THEN 5MG IN AM AND 10MG IN PM X 1WK THEN 10MG TWICE DAILY     Called in the above prescription to [facility] spoke to Jill (pharmacy tech). Jill stated they received the medication from us on  and it has been refilled.

## 2018-12-20 NOTE — TELEPHONE ENCOUNTER
Patient called and left voicemail inquiring about insurance coverage of memantine. She is requesting a call back.     Zainab Sanches, Pharm.D.  Medication Therapy Management Pharmacist  Phone: [phone]

## 2019-01-07 ENCOUNTER — TELEPHONE (OUTPATIENT)
Dept: PHARMACY | Facility: CLINIC | Age: 74
End: 2019-01-07

## 2019-01-07 NOTE — TELEPHONE ENCOUNTER
Patient left voicemail for [facility] pharmacist and is requesting a call back.    Zainab Sanches, Pharm.D.  Medication Therapy Management Pharmacist  Phone: [phone]

## 2019-01-07 NOTE — TELEPHONE ENCOUNTER
Spoke with patient and she states that Exelon will no longer be covered by her insurance. She has been getting brand-name Exelon. Per the letter she read to me, rivastigmine patches are covered, so I instructed her to get the generic formulation for her next refill.     In addition, patient states she has the memantine prescription but has not started taking it. She will call me when she decides to start it and I agreed to this plan.    Zainab Sanches, Pharm.D.  Medication Therapy Management Pharmacist  Phone: [phone]

## 2019-01-28 ENCOUNTER — TELEPHONE (OUTPATIENT)
Dept: PHARMACY | Facility: CLINIC | Age: 74
End: 2019-01-28

## 2019-01-28 NOTE — TELEPHONE ENCOUNTER
Patient called inquiring about her new Assistance Fund card. I encouraged her to bring it in to her local [facility] where she is getting the Exelon patches. She expressed understanding of this plan.    Zainab Sanches, Pharm.D.  Medication Therapy Management Pharmacist  Phone: [phone]

## 2019-02-12 ENCOUNTER — TELEPHONE (OUTPATIENT)
Dept: PHARMACY | Facility: CLINIC | Age: 74
End: 2019-02-12

## 2019-02-12 NOTE — TELEPHONE ENCOUNTER
Patient called with questions regarding prescription insurance with [facility]. She states that she had a total cost of $8000 for her prescriptions in 2018 with about $5000 out of pocket costs. She also used the Assistance [employer] to help pay for some of her Parkinson's medications. She is wondering how to file her taxes in regards to these costs. I will refer her to our  to discuss these questions. [facility] may also be able to help.     Routing message to Nolvia Olvera.    Zainab Sanches, Pharm.D.  Medication Therapy Management Pharmacist  Phone: [phone]

## 2019-02-14 NOTE — TELEPHONE ENCOUNTER
Cayetano Lamb  I talked with Colleen and she had already obtained some info about filling taxes. She sends her taxes out to be done. I indicated that to my knowledge, they want info to show how much you have spent out of your pocket on your health care. She also was looking for foot care providers and I gave her the # for the [facility].  Nolvia

## 2019-02-25 NOTE — PROGRESS NOTES
Diagnosis/Summary/Recommendations:    PATIENT: Paige Mercado  73 year old female     : 1945    VLADIMIR: 2019    MATT BROUGHT HER DOWN  MATT IS FROM [place] AND DROVE HER IN.    QUESTIONS    BLOOD PRESSURE - STOPPED HER MEDICATION PRIOR TO LAST VISIT - TODAY'S /74  RUNNY NOSE  RYTARY - WONDERING ABOUT T HIS MEDICATION.   HAS SOME TWITCHING - MYOCLONUS WHEN STANDING  HER HANDWRITING IS WORSE  SHE ASKED ABOUT LISINOPRIL BLOOD PRESSURE MEDICATION       Medications     7am 11am 3pm 8pm 10pm      Acetaminophen 500mg RARE                  Aspirin 81mg 1                 Carbidopa/levodopa 25/100 Sinemet  2 2 2 2         Cholecalciferol vitamin d2000 1                  Citalopram celexa 40mg             1      Fluticasone flonase 50mcg/act nasall spray               Levothyroxine synthroid 50 mcg 1                  MEMANTINE NAMENDA 5MG  2?        2?     Nonformulary runs smoothly tea SMOOTH MOVE               Polyethylene glycol miralax            1/2CAP      Quetiapine 100mg seroquel             1      Quetiapine 25mg seroquel 1          1   and as needed   Rivastigmine exelon 13.3mg/24 hr 24 hr patch 1             Ropinirole requip 1mg 1 1 1            Senna-docusate senokot-S/pericolace 8.6-50 mg     start    Simvastatin zocor 20mg             1      Sumatriptan imitrex 25mg As needed                                                                  History obtained from patient     PLAN  1. Continue her medication regimen. There has been benefit from the namenda and the rivastigmine patch  She has been ramping up on the nameda and will be on the full dose of the namenda this   She is on 13.3 of the rivastigmine patch    2. The rytary is very expensive    3. Use the senokot-S with the 1/2 cap of miralax    4. Anxiety. Consider 25mg of seroquel as needed plus the scheduled dose. For anxiety    5. Return appointment. With me in  and Alyssa in July     Keith           Coding statement:   Duration  of  Services: patient care and care coordination was 25 minutes  Greater than 50% of this visit was spent in counseling and coordination of care.     Keith Styles MD     ______________________________________    Last visit date and details:     VLADIMIR: December 13, 2018     Parkinsonism     Medications     7am 11am 3pm 8pm 10pm      Acetaminophen 500mg RARE                  Alprazolam xanax 0.25mg STOPPED             Aspirin 81mg         1 (MAY STOP)         Carbidopa/levodopa Sinemet CR 25/100 NOT Taking             Carbidopa/levodopa 25/100 Sinemet  2 2 2 2         Cholecalciferol vitamin d2000 1                  Citalopram celexa 40mg 1                  Cyanocobalamin vitamin B12 1000mcg RAN OUT             Fluticasone flonase 50mcg/act nasall spray               Levothyroxine synthroid 50 mcg 1                  Lisinopril prinivil/zestril 2.5mg  not taking          1      Nonformulary runs smoothly tea SMOOTH MOVE               Polyethylene glycol miralax            1/2-1CAP      Quetiapine 100mg seroquel             1      Quetiapine 25mg seroquel 1          1      Rivastigmine exelon 13.3mg/24 hr 24 hr patch 1             Ropinirole requip 1mg 1 1 1            Simvastatin zocor 20mg             1      Sumatriptan imitrex 25mg As needed                                                                      History obtained from patient     PLAN  NAMENDA TRIAL  ?BOTOX FOR OROFACIAL DYSKINESIA  COLD MEDICATION FOR PARKINSON  DOSING ERRORS   HOME HEALTH ISSUES.                   Documentation of a Face-to-Face Physician Encounter    December 13, 2018     Paige Mercado  1945  [id number]     I certify that this patient is under my care and that I, or a nurse practitioner or physician's assistant working with me, had a face-to-face encounter that meets the physician face-to-face encounter requirements with this patient on: 12/13/2018.     The encounter with the patient was in whole, or in part, for the following  medical condition, which is the primary reason for home health care:      Patient Active Problem List   Diagnosis     Allergic rhinitis     Anxiety     Conversion disorder     Depression     Essential hypertension     Generalized anxiety disorder     H/O bone density study     HTN (hypertension), benign     Hyperlipidemia     Hyperplastic polyps of stomach     Hypothyroidism     Lumbago     Migraine     Osteoarthrosis     Parkinson disease (H)     Parkinson's disease (H)     Parotid mass     PD (Parkinson's disease) (H)     Pyelonephritis     Prediabetes     S/P colonoscopy     Warthin's tumor     Mild cognitive impairment     Personal history of malignant neoplasm of breast     Hypothyroidism, unspecified type         I certify that, based on my findings, the following services are medically necessary home health services: Nursing     My clinical findings support the need for the above services because: PARKINSON     Further, I certify that my clinical findings support that this patient is homebound (i.e. absences from home require considerable and taxing effort and are for medical reasons or Confucianism services or infrequently or of short duration when for other reasons) because: PARKINSONI        Physician signature _______KEITH STYLES____________________________   December 13, 2018  Physician name: Keith Styles MD     Fax ([phone]) or scan/email ([email or web address]) this completed document to [facility] within 24 hours of the referral date.  Questions: [phone].              Medications     7am 11am 3pm 8pm 10pm      Acetaminophen 500mg RARE                  Alprazolam xanax 0.25mg STOPPED             Aspirin 81mg         1 (MAY STOP)         Carbidopa/levodopa 25/100 Sinemet  2 2 2 2         Cholecalciferol vitamin d2000 1                  Citalopram celexa 40mg 1                  Cyanocobalamin vitamin B12 1000mcg RAN OUT             Fluticasone flonase 50mcg/act nasall spray                Levothyroxine synthroid 50 mcg 1                  MEMANTINE NAMENDA 5MG               Nonformulary runs smoothly tea SMOOTH MOVE               Polyethylene glycol miralax            1/2-1CAP      Quetiapine 100mg seroquel             1      Quetiapine 25mg seroquel 1          1      Rivastigmine exelon 13.3mg/24 hr 24 hr patch 1             Ropinirole requip 1mg 1 1 1            Simvastatin zocor 20mg             1      Sumatriptan imitrex 25mg As needed                                                                              ______________________________________      Patient was asked about 14 Review of systems including changes in vision (dry eyes, double vision), hearing, heart, lungs, musculoskeletal, depression, anxiety, snoring, RBD, insomnia, urinary frequency, urinary urgency, constipation, swallowing problems, hematological, ID, allergies, skin problems: seborrhea, endocrinological: thyroid, diabetes, cholesterol; balance, weight changes, and other neurological problems and these were not significant at this time except for   Patient Active Problem List   Diagnosis     Allergic rhinitis     Anxiety     Conversion disorder     Depression     Essential hypertension     Generalized anxiety disorder     H/O bone density study     HTN (hypertension), benign     Hyperlipidemia     Hyperplastic polyps of stomach     Hypothyroidism     Lumbago     Migraine     Osteoarthrosis     Parkinson disease (H)     Parkinson's disease (H)     Parotid mass     PD (Parkinson's disease) (H)     Pyelonephritis     Prediabetes     S/P colonoscopy     Warthin's tumor     Mild cognitive impairment     Personal history of malignant neoplasm of breast     Hypothyroidism, unspecified type          Allergies   Allergen Reactions     Ciprofloxacin GI Disturbance     Ciprofloxacin      Other reaction(s): Intolerance-Can't Take  PN: LW Reaction: NERVOUSNESS       Sulfa Drugs GI Disturbance     Other reaction(s): Intolerance-Can't  "Take  PN: LW Reaction: GI Upset       Xanax [Alprazolam] Other (See Comments)     \"zombie-like\" and confused     Past Surgical History:   Procedure Laterality Date     ARTHROSCOPY SHOULDER       EYE SURGERY Right     scheduled right cataract 1st may 2018     EYE SURGERY Left     scheduled left cataract may 2018     GYN SURGERY      hysterectomy - Partial?     HEMORRHOIDECTOMY       MASTECTOMY Right     breast cancer  2000 or so     PAROTIDECTOMY Left     [place] tumor 2012     TONSILLECTOMY      3 yrs of age     Past Medical History:   Diagnosis Date     Cancer (H)     breast     Social History     Socioeconomic History     Marital status:      Spouse name: Not on file     Number of children: Not on file     Years of education: Not on file     Highest education level: Not on file   Occupational History     Not on file   Social Needs     Financial resource strain: Not on file     Food insecurity:     Worry: Not on file     Inability: Not on file     Transportation needs:     Medical: Not on file     Non-medical: Not on file   Tobacco Use     Smoking status: Never Smoker     Smokeless tobacco: Never Used   Substance and Sexual Activity     Alcohol use: No     Drug use: No     Sexual activity: No   Lifestyle     Physical activity:     Days per week: Not on file     Minutes per session: Not on file     Stress: Not on file   Relationships     Social connections:     Talks on phone: Not on file     Gets together: Not on file     Attends Rastafari service: Not on file     Active member of club or organization: Not on file     Attends meetings of clubs or organizations: Not on file     Relationship status: Not on file     Intimate partner violence:     Fear of current or ex partner: Not on file     Emotionally abused: Not on file     Physically abused: Not on file     Forced sexual activity: Not on file   Other Topics Concern     Parent/sibling w/ CABG, MI or angioplasty before 65F 55M? No   Social History Narrative "    lives in [facility]    Ibis Bethea is daughter       Drug and lactation database from the [place] [employer] of Medicine:  http://toxnet.nlm.nih.gov/cgi-bin/sis/htmlgen?LACT      B/P: Data Unavailable, T: Data Unavailable, P: Data Unavailable, R: Data Unavailable 0 lbs 0 oz  not currently breastfeeding., There is no height or weight on file to calculate BMI.  Medications and Vitals not listed above were documented in the cart and reviewed by me.     Current Outpatient Medications   Medication Sig Dispense Refill     acetaminophen (TYLENOL) 500 MG tablet Take 1 tablet (500 mg) by mouth every 4 hours as needed for mild pain       ALPRAZolam (XANAX) 0.25 MG tablet Take 0.25 mg by mouth       aspirin (ASPIRIN LOW DOSE) 81 MG tablet 81mg tablet @ NIGHT 30 tablet      carbidopa-levodopa (SINEMET)  MG tablet 2 tabs @ 7am, 11am, 3pm and 8pm and 1 tab as needed 810 tablet 3     Cholecalciferol (VITAMIN D) 2000 UNITS CAPS 2000 units @ 7am 30 capsule      citalopram (CELEXA) 40 MG tablet 40 mg tablet by mouth @ 10pm 90 tablet 3     levothyroxine (SYNTHROID) 50 MCG tablet 50mcg tablet by mouth @ 7am 90 tablet 3     memantine (NAMENDA) 5 MG tablet 5MG/DAY X 1WK THEN 5MG TWICE DAILY X 1 WEEK THEN 5MG IN AM AND 10MG IN PM X 1WK THEN 10MG TWICE DAILY 360 tablet 3     NONFORMULARY Runs smoothly tea from cub between 9-10pm       polyethylene glycol (MIRALAX/GLYCOLAX) packet 1/2 CAPFUL BY MOUTH NIGHTLY 7 packet      QUEtiapine (SEROQUEL) 100 MG tablet 100mg tablet @ 10pm; takes with a 25mg tablet 60 tablet 11     QUEtiapine (SEROQUEL) 25 MG tablet 1 tab @ 7am and 2 tabs @ 10pm (along with 100mg at bedtime) 270 tablet 3     rivastigmine (EXELON) 13.3 MG/24HR 24 hr patch Place 1 patch onto the skin daily 90 patch 3     rOPINIRole (REQUIP) 1 MG tablet 1mg tablet by mouth @ 7am, 11am and 3pm 270 tablet 3     simvastatin (ZOCOR) 20 MG tablet 20mg tablet by mouth @ 10pm 90 tablet 3     SUMAtriptan (IMITREX) 25 MG  tablet Take 1 tablet (25 mg) by mouth at onset of headache for migraine 30 tablet          Keith Styles MD

## 2019-02-26 ENCOUNTER — TELEPHONE (OUTPATIENT)
Dept: NEUROLOGY | Facility: CLINIC | Age: 74
End: 2019-02-26

## 2019-02-26 NOTE — TELEPHONE ENCOUNTER
rivastigmine (EXELON) 13.3 MG/24HR 24 hr patch 90 patch 3 12/13/2018  No   Sig - Route: Place 1 patch onto the skin daily - Transdermal     Prior Authorization Retail Medication Request    Medication/Dose: See above  ICD code (if different than what is on RX):  Parkinson's disease G20, Memory problem R41.3  Previously Tried and Failed:  Carbidopa/levodopa  mg, Ropinirole 1 mg, Quetiapine 25 mg, Quetiapine 100 mg  Rationale:  She has been taking this medication for a while now and it works great for her.    Insurance Name:  [employer]  Insurance ID: [id number]       Pharmacy Information (if different than what is on RX)  [facility]   Phone:  [phone]

## 2019-02-28 NOTE — TELEPHONE ENCOUNTER
Central Prior Authorization Team   Phone: [phone]      PA Initiation    Medication: Rivastigmine (Exelon) 13.3 mg-PA Started  Insurance Company: [employer] [place] - Phone [phone] Fax [phone]  Pharmacy Filling the Rx: [facility] [id number] [place], [place] - [address] AT [facility] [address]  Filling Pharmacy Phone: [phone]  Filling Pharmacy Fax: [phone]  Start Date: 2/27/2019

## 2019-03-01 ENCOUNTER — OFFICE VISIT (OUTPATIENT)
Dept: NEUROLOGY | Facility: CLINIC | Age: 74
End: 2019-03-01
Payer: MEDICARE

## 2019-03-01 VITALS — DIASTOLIC BLOOD PRESSURE: 74 MMHG | HEART RATE: 69 BPM | OXYGEN SATURATION: 98 % | SYSTOLIC BLOOD PRESSURE: 158 MMHG

## 2019-03-01 DIAGNOSIS — G24.4 OROFACIAL DYSKINESIA: ICD-10-CM

## 2019-03-01 DIAGNOSIS — R41.3 MEMORY PROBLEM: ICD-10-CM

## 2019-03-01 DIAGNOSIS — E78.49 OTHER HYPERLIPIDEMIA: ICD-10-CM

## 2019-03-01 DIAGNOSIS — G20.A1 PARKINSON'S DISEASE (H): Primary | ICD-10-CM

## 2019-03-01 RX ORDER — AMOXICILLIN 250 MG
CAPSULE ORAL
Qty: 7 TABLET | COMMUNITY
Start: 2019-03-01 | End: 2019-03-01

## 2019-03-01 RX ORDER — AMOXICILLIN 250 MG
CAPSULE ORAL
Qty: 7 TABLET | Refills: 11 | Status: SHIPPED | OUTPATIENT
Start: 2019-03-01 | End: 2019-06-01

## 2019-03-01 RX ORDER — QUETIAPINE FUMARATE 25 MG/1
TABLET, FILM COATED ORAL
Qty: 360 TABLET | Refills: 3 | Status: SHIPPED | OUTPATIENT
Start: 2019-03-01 | End: 2020-01-01

## 2019-03-01 RX ORDER — MEMANTINE HYDROCHLORIDE 5 MG/1
TABLET ORAL
Qty: 360 TABLET | Refills: 3 | COMMUNITY
Start: 2019-03-01 | End: 2019-06-11

## 2019-03-01 RX ORDER — POLYETHYLENE GLYCOL 3350 17 G/17G
POWDER, FOR SOLUTION ORAL
Qty: 7 PACKET | COMMUNITY
Start: 2019-03-01 | End: 2020-01-01

## 2019-03-01 RX ORDER — SIMVASTATIN 20 MG
TABLET ORAL
Qty: 90 TABLET | Refills: 3 | COMMUNITY
Start: 2019-03-01 | End: 2019-10-29

## 2019-03-01 ASSESSMENT — PAIN SCALES - GENERAL: PAINLEVEL: NO PAIN (0)

## 2019-03-01 NOTE — PATIENT INSTRUCTIONS
VLADIMIR: March 1, 2019    MATT BROUGHT HER DOWN  MATT IS FROM [place] AND DROVE HER IN.    QUESTIONS    BLOOD PRESSURE - STOPPED HER MEDICATION PRIOR TO LAST VISIT - TODAY'S /74  RUNNY NOSE  RYTARY - WONDERING ABOUT T HIS MEDICATION.   HAS SOME TWITCHING - MYOCLONUS WHEN STANDING  HER HANDWRITING IS WORSE  SHE ASKED ABOUT LISINOPRIL BLOOD PRESSURE MEDICATION       Medications     7am 11am 3pm 8pm 10pm      Acetaminophen 500mg RARE                  Aspirin 81mg 1                 Carbidopa/levodopa 25/100 Sinemet  2 2 2 2         Cholecalciferol vitamin d2000 1                  Citalopram celexa 40mg             1      Fluticasone flonase 50mcg/act nasall spray               Levothyroxine synthroid 50 mcg 1                  MEMANTINE NAMENDA 5MG  2?        2?     Nonformulary runs smoothly tea SMOOTH MOVE               Polyethylene glycol miralax            1/2CAP      Quetiapine 100mg seroquel             1      Quetiapine 25mg seroquel 1          1   and as needed   Rivastigmine exelon 13.3mg/24 hr 24 hr patch 1             Ropinirole requip 1mg 1 1 1            Senna-docusate senokot-S/pericolace 8.6-50 mg     start    Simvastatin zocor 20mg             1      Sumatriptan imitrex 25mg As needed                                                                  History obtained from patient     PLAN  1. Continue her medication regimen. There has been benefit from the namenda and the rivastigmine patch  She has been ramping up on the nameda and will be on the full dose of the namenda this Sunday  She is on 13.3 of the rivastigmine patch    2. The rytary is very expensive    3. Use the senokot-S with the 1/2 cap of miralax    4. Anxiety. Consider 25mg of seroquel as needed plus the scheduled dose. For anxiety    5. Return appointment. With me in June and Alyssa in July     Keith

## 2019-03-01 NOTE — TELEPHONE ENCOUNTER
PRIOR AUTHORIZATION DENIED    Medication: Rivastigmine (Exelon) 13.3 mg-DENIED    Denial Date: 3/1/2019    Denial Rational:           Appeal Information:

## 2019-03-01 NOTE — Clinical Note
3/1/2019       RE: Paige Mercado  [address] [place] [address]     Dear Colleague,    Thank you for referring your patient, Paige Mercado, to the [facility] NEUROLOGY at [facility]. Please see a copy of my visit note below.    No notes on file    Again, thank you for allowing me to participate in the care of your patient.      Sincerely,    Keith Styles MD

## 2019-03-01 NOTE — LETTER
3/1/2019      RE: Paige Mercado   [address] [place] [address]       Diagnosis/Summary/Recommendations:    PATIENT: Paige Mercado  73 year old female     : 1945    VLADIMIR: 2019    MATT BROUGHT HER DOWN  MATT IS FROM [place] AND DROVE HER IN.    QUESTIONS    BLOOD PRESSURE - STOPPED HER MEDICATION PRIOR TO LAST VISIT - TODAY'S /74  RUNNY NOSE  RYTARY - WONDERING ABOUT T HIS MEDICATION.   HAS SOME TWITCHING - MYOCLONUS WHEN STANDING  HER HANDWRITING IS WORSE  SHE ASKED ABOUT LISINOPRIL BLOOD PRESSURE MEDICATION       Medications     7am 11am 3pm 8pm 10pm      Acetaminophen 500mg RARE                  Aspirin 81mg 1                 Carbidopa/levodopa 25/100 Sinemet  2 2 2 2         Cholecalciferol vitamin d2000 1                  Citalopram celexa 40mg             1      Fluticasone flonase 50mcg/act nasall spray               Levothyroxine synthroid 50 mcg 1                  MEMANTINE NAMENDA 5MG  2?        2?     Nonformulary runs smoothly tea SMOOTH MOVE               Polyethylene glycol miralax            1/2CAP      Quetiapine 100mg seroquel             1      Quetiapine 25mg seroquel 1          1   and as needed   Rivastigmine exelon 13.3mg/24 hr 24 hr patch 1             Ropinirole requip 1mg 1 1 1            Senna-docusate senokot-S/pericolace 8.6-50 mg     start    Simvastatin zocor 20mg             1      Sumatriptan imitrex 25mg As needed                                                                  History obtained from patient     PLAN  1. Continue her medication regimen. There has been benefit from the namenda and the rivastigmine patch  She has been ramping up on the nameda and will be on the full dose of the namenda this   She is on 13.3 of the rivastigmine patch    2. The rytary is very expensive    3. Use the senokot-S with the 1/2 cap of miralax    4. Anxiety. Consider 25mg of seroquel as needed plus the scheduled dose. For anxiety    5. Return  appointment. With me in June and Alyssa in July     Keith           Coding statement:   Duration of  Services: patient care and care coordination was 25 minutes  Greater than 50% of this visit was spent in counseling and coordination of care.     Keith Styles MD     ______________________________________    Last visit date and details:     VLADIMIR: December 13, 2018     Parkinsonism     Medications     7am 11am 3pm 8pm 10pm      Acetaminophen 500mg RARE                  Alprazolam xanax 0.25mg STOPPED             Aspirin 81mg         1 (MAY STOP)         Carbidopa/levodopa Sinemet CR 25/100 NOT Taking             Carbidopa/levodopa 25/100 Sinemet  2 2 2 2         Cholecalciferol vitamin d2000 1                  Citalopram celexa 40mg 1                  Cyanocobalamin vitamin B12 1000mcg RAN OUT             Fluticasone flonase 50mcg/act nasall spray               Levothyroxine synthroid 50 mcg 1                  Lisinopril prinivil/zestril 2.5mg  not taking          1      Nonformulary runs smoothly tea SMOOTH MOVE               Polyethylene glycol miralax            1/2-1CAP      Quetiapine 100mg seroquel             1      Quetiapine 25mg seroquel 1          1      Rivastigmine exelon 13.3mg/24 hr 24 hr patch 1             Ropinirole requip 1mg 1 1 1            Simvastatin zocor 20mg             1      Sumatriptan imitrex 25mg As needed                                                                      History obtained from patient     PLAN  NAMENDA TRIAL  ?BOTOX FOR OROFACIAL DYSKINESIA  COLD MEDICATION FOR PARKINSON  DOSING ERRORS   HOME HEALTH ISSUES.                   Documentation of a Face-to-Face Physician Encounter    December 13, 2018     Paige Mercado  1945  [id number]     I certify that this patient is under my care and that I, or a nurse practitioner or physician's assistant working with me, had a face-to-face encounter that meets the physician face-to-face encounter requirements with this patient on:  12/13/2018.     The encounter with the patient was in whole, or in part, for the following medical condition, which is the primary reason for home health care:      Patient Active Problem List   Diagnosis     Allergic rhinitis     Anxiety     Conversion disorder     Depression     Essential hypertension     Generalized anxiety disorder     H/O bone density study     HTN (hypertension), benign     Hyperlipidemia     Hyperplastic polyps of stomach     Hypothyroidism     Lumbago     Migraine     Osteoarthrosis     Parkinson disease (H)     Parkinson's disease (H)     Parotid mass     PD (Parkinson's disease) (H)     Pyelonephritis     Prediabetes     S/P colonoscopy     Warthin's tumor     Mild cognitive impairment     Personal history of malignant neoplasm of breast     Hypothyroidism, unspecified type         I certify that, based on my findings, the following services are medically necessary home health services: Nursing     My clinical findings support the need for the above services because: PARKINSON     Further, I certify that my clinical findings support that this patient is homebound (i.e. absences from home require considerable and taxing effort and are for medical reasons or Evangelical services or infrequently or of short duration when for other reasons) because: PARKINSONI        Physician signature _______KEITH STYLES____________________________   December 13, 2018  Physician name: Keith Styles MD     Fax ([phone]) or scan/email ([email or web address]) this completed document to [facility] within 24 hours of the referral date.  Questions: [phone].              Medications     7am 11am 3pm 8pm 10pm      Acetaminophen 500mg RARE                  Alprazolam xanax 0.25mg STOPPED             Aspirin 81mg         1 (MAY STOP)         Carbidopa/levodopa 25/100 Sinemet  2 2 2 2         Cholecalciferol vitamin d2000 1                  Citalopram celexa 40mg 1                  Cyanocobalamin  vitamin B12 1000mcg RAN OUT             Fluticasone flonase 50mcg/act nasall spray               Levothyroxine synthroid 50 mcg 1                  MEMANTINE NAMENDA 5MG               Nonformulary runs smoothly tea SMOOTH MOVE               Polyethylene glycol miralax            1/2-1CAP      Quetiapine 100mg seroquel             1      Quetiapine 25mg seroquel 1          1      Rivastigmine exelon 13.3mg/24 hr 24 hr patch 1             Ropinirole requip 1mg 1 1 1            Simvastatin zocor 20mg             1      Sumatriptan imitrex 25mg As needed                                                                              ______________________________________      Patient was asked about 14 Review of systems including changes in vision (dry eyes, double vision), hearing, heart, lungs, musculoskeletal, depression, anxiety, snoring, RBD, insomnia, urinary frequency, urinary urgency, constipation, swallowing problems, hematological, ID, allergies, skin problems: seborrhea, endocrinological: thyroid, diabetes, cholesterol; balance, weight changes, and other neurological problems and these were not significant at this time except for   Patient Active Problem List   Diagnosis     Allergic rhinitis     Anxiety     Conversion disorder     Depression     Essential hypertension     Generalized anxiety disorder     H/O bone density study     HTN (hypertension), benign     Hyperlipidemia     Hyperplastic polyps of stomach     Hypothyroidism     Lumbago     Migraine     Osteoarthrosis     Parkinson disease (H)     Parkinson's disease (H)     Parotid mass     PD (Parkinson's disease) (H)     Pyelonephritis     Prediabetes     S/P colonoscopy     Warthin's tumor     Mild cognitive impairment     Personal history of malignant neoplasm of breast     Hypothyroidism, unspecified type          Allergies   Allergen Reactions     Ciprofloxacin GI Disturbance     Ciprofloxacin      Other reaction(s): Intolerance-Can't Take  PN: LW  "Reaction: NERVOUSNESS       Sulfa Drugs GI Disturbance     Other reaction(s): Intolerance-Can't Take  PN: LW Reaction: GI Upset       Xanax [Alprazolam] Other (See Comments)     \"zombie-like\" and confused     Past Surgical History:   Procedure Laterality Date     ARTHROSCOPY SHOULDER       EYE SURGERY Right     scheduled right cataract 1st may 2018     EYE SURGERY Left     scheduled left cataract may 2018     GYN SURGERY      hysterectomy - Partial?     HEMORRHOIDECTOMY       MASTECTOMY Right     breast cancer  2000 or so     PAROTIDECTOMY Left     [place] tumor 2012     TONSILLECTOMY      3 yrs of age     Past Medical History:   Diagnosis Date     Cancer (H)     breast     Social History     Socioeconomic History     Marital status:      Spouse name: Not on file     Number of children: Not on file     Years of education: Not on file     Highest education level: Not on file   Occupational History     Not on file   Social Needs     Financial resource strain: Not on file     Food insecurity:     Worry: Not on file     Inability: Not on file     Transportation needs:     Medical: Not on file     Non-medical: Not on file   Tobacco Use     Smoking status: Never Smoker     Smokeless tobacco: Never Used   Substance and Sexual Activity     Alcohol use: No     Drug use: No     Sexual activity: No   Lifestyle     Physical activity:     Days per week: Not on file     Minutes per session: Not on file     Stress: Not on file   Relationships     Social connections:     Talks on phone: Not on file     Gets together: Not on file     Attends Christianity service: Not on file     Active member of club or organization: Not on file     Attends meetings of clubs or organizations: Not on file     Relationship status: Not on file     Intimate partner violence:     Fear of current or ex partner: Not on file     Emotionally abused: Not on file     Physically abused: Not on file     Forced sexual activity: Not on file   Other Topics " Concern     Parent/sibling w/ CABG, MI or angioplasty before 65F 55M? No   Social History Narrative    lives in [facility]    Ibis Bethea is daughter       Drug and lactation database from the [place] National Library of Medicine:  http://toxnet.nlm.nih.gov/cgi-bin/sis/htmlgen?LACT      B/P: Data Unavailable, T: Data Unavailable, P: Data Unavailable, R: Data Unavailable 0 lbs 0 oz  not currently breastfeeding., There is no height or weight on file to calculate BMI.  Medications and Vitals not listed above were documented in the cart and reviewed by me.     Current Outpatient Medications   Medication Sig Dispense Refill     acetaminophen (TYLENOL) 500 MG tablet Take 1 tablet (500 mg) by mouth every 4 hours as needed for mild pain       ALPRAZolam (XANAX) 0.25 MG tablet Take 0.25 mg by mouth       aspirin (ASPIRIN LOW DOSE) 81 MG tablet 81mg tablet @ NIGHT 30 tablet      carbidopa-levodopa (SINEMET)  MG tablet 2 tabs @ 7am, 11am, 3pm and 8pm and 1 tab as needed 810 tablet 3     Cholecalciferol (VITAMIN D) 2000 UNITS CAPS 2000 units @ 7am 30 capsule      citalopram (CELEXA) 40 MG tablet 40 mg tablet by mouth @ 10pm 90 tablet 3     levothyroxine (SYNTHROID) 50 MCG tablet 50mcg tablet by mouth @ 7am 90 tablet 3     memantine (NAMENDA) 5 MG tablet 5MG/DAY X 1WK THEN 5MG TWICE DAILY X 1 WEEK THEN 5MG IN AM AND 10MG IN PM X 1WK THEN 10MG TWICE DAILY 360 tablet 3     NONFORMULARY Runs smoothly tea from cub between 9-10pm       polyethylene glycol (MIRALAX/GLYCOLAX) packet 1/2 CAPFUL BY MOUTH NIGHTLY 7 packet      QUEtiapine (SEROQUEL) 100 MG tablet 100mg tablet @ 10pm; takes with a 25mg tablet 60 tablet 11     QUEtiapine (SEROQUEL) 25 MG tablet 1 tab @ 7am and 2 tabs @ 10pm (along with 100mg at bedtime) 270 tablet 3     rivastigmine (EXELON) 13.3 MG/24HR 24 hr patch Place 1 patch onto the skin daily 90 patch 3     rOPINIRole (REQUIP) 1 MG tablet 1mg tablet by mouth @ 7am, 11am and 3pm 270 tablet 3      simvastatin (ZOCOR) 20 MG tablet 20mg tablet by mouth @ 10pm 90 tablet 3     SUMAtriptan (IMITREX) 25 MG tablet Take 1 tablet (25 mg) by mouth at onset of headache for migraine 30 tablet          Keith Styles MD

## 2019-03-02 NOTE — TELEPHONE ENCOUNTER
Prior Authorization Not Needed per Insurance    Medication: Rivastigmine (Exelon) 13.3 mg-  Insurance Company: [employer] - Phone [phone] Fax [phone]  Expected CoPay:      Pharmacy Filling the Rx: [facility] [id number] [place] [address] - [facility] [address] AT [facility]  Pharmacy Notified: Yes  Patient Notified: No    Called pharmacy for active insurance, but they stated that they already have a paid claim through [facility] for $0 co-pay a couple days ago. No PA is needed.

## 2019-03-04 ENCOUNTER — MYC MEDICAL ADVICE (OUTPATIENT)
Dept: NEUROLOGY | Facility: CLINIC | Age: 74
End: 2019-03-04

## 2019-03-04 DIAGNOSIS — G20.A1 PARKINSON DISEASE (H): Primary | ICD-10-CM

## 2019-03-04 NOTE — TELEPHONE ENCOUNTER
I called and spoke with daughter Ibis. She requested that signed Rx be emailed to her at [email or web address]. This was done. She will check if insurance will cover this and print if needed.    Mari Avalos LPN

## 2019-03-04 NOTE — TELEPHONE ENCOUNTER
Patient just called to follow up on the status of the PA for rivastigmine. It appears as though [employer] is no longer covering the patch despite the patient getting a paid claim a couple days ago.     Routing back to PA team to investigate this.     Zainab Sanches Pharm.D.  Medication Therapy Management Pharmacist  Phone: [phone]

## 2019-03-04 NOTE — TELEPHONE ENCOUNTER
Called [facility] to confirm if drug is covered. Per rep, he ran a test claim just to be sure and stated that drug is covered for the rest of the year. He said if patient did receive a letter to disregard it.

## 2019-03-05 NOTE — TELEPHONE ENCOUNTER
rivastigmine (EXELON) 13.3 MG/24HR 24 hr patch 90 patch 3 12/13/2018  No   Sig - Route: Place 1 patch onto the skin daily - Transdermal     Called Paige and informed her that the above medication is covered by her insurance for the rest of the year. When the year is up I informed her we will send another prior authorization to her insurance to obtain coverage again. Paige was very appreciative of the call.

## 2019-04-01 ENCOUNTER — TELEPHONE (OUTPATIENT)
Dept: PHARMACY | Facility: CLINIC | Age: 74
End: 2019-04-01

## 2019-04-01 NOTE — TELEPHONE ENCOUNTER
"Patient called and left  for [facility] pharmacist stating that [facility] \"refused\" to fill her rivastigmine patch this month. She had received letters from [facility] stating that it will no longer be covered but they did approve a one-month supply, of which she only has 2 remaining patches. Patient is requesting a call back.     Zainab Sanches, Pharm.D.  Medication Therapy Management Pharmacist  Phone: [phone]  "

## 2019-04-01 NOTE — TELEPHONE ENCOUNTER
Spoke with Na. They were able to tell me that the generic rivastigmine patches are covered, but the brand name Exelon patches are not covered.     Spoke with [facility]. They processed the prescription for generic rivastigmine patches and it is covered by [employer] but the copay is high. [facility] had difficulties processing the [employer] for copay assistance but they will call me back after they call the processor.    Zainab Sanches, Pharm.D.  Medication Therapy Management Pharmacist  Phone: [phone]

## 2019-04-01 NOTE — TELEPHONE ENCOUNTER
Mayra, pharmacy technician, from [facility] called to state that they figured out the issue and now the patient will be getting the rivastigmine patches for $0 copay.    Zainab Sanches, Pharm.D.  Medication Therapy Management Pharmacist  Phone: [phone]

## 2019-05-02 ENCOUNTER — TELEPHONE (OUTPATIENT)
Dept: NEUROLOGY | Facility: CLINIC | Age: 74
End: 2019-05-02

## 2019-05-02 NOTE — TELEPHONE ENCOUNTER
Assessment   Itchiness where she placed the rivastigmine patch, woke her up last night at 3 AM. Site stays red until the next day, denies red welts/hives, trouble breathing and throat tightness.    Uses alcohol to wipe off adhesive  Has tried a wet rag, did not help get the adhesive off after taking patch off  States she rotates sites the best she can, she cannot get it on her back, lives alone    How long have you had the increase in symptoms?    Past 3-4 days (has been on this dose for the past month and has otherwise not had an issue)    Background  Patient is taking:  rivastigmine (EXELON) 13.3 MG/24HR 24 hr patch 90 patch 3 12/13/2018  No   Sig - Route: Place 1 patch onto the skin daily - Transdermal   -Nn this dose for a month    -Took off at 3 AM last night, not currently wearing it, she is asking if she should stop using the patch altogether    Paige asked that I leave a message on her home phone with Dr. Styles's and Dr. Sanches's instructions if she does not answer when I call her back.  Recommendation  1. I advised Paige to use a wet rag with light amount of soap to wipe off the area after she takes the patch off, and to avoid alcohol and this could irritate the skin more. (Paige stated the alcohol works better at getting the adhesive off and she prefers to use this.)    2. I advised Paige to continue to use the patch for now as we do not want her to stop taking this cold turkey.    3. I advised Paige I will discuss with Dr. Styles.

## 2019-05-02 NOTE — TELEPHONE ENCOUNTER
[facility]    Phone Message    May a detailed message be left on voicemail: yes    Reason for Call: Other: Paige calling to request a call back. She states shes been itchy and she would like to know what to do. Please call her back to discuss.       Action Taken: Message routed to:  [facility] ([facility]): jennifer neuro

## 2019-05-03 NOTE — TELEPHONE ENCOUNTER
Recommendations for itching/irritation from medication patch use:  1) Do not use the same application site within 14 days   2) Wash the area with gentle soap and water after removing patch   3) Apply baby oil to the sight  4) If you experience trouble breathing call 911 right away    Paige asked what she would do if she forgets to patch the patch on and half the day goes by.  1.I informed Paige to put the patch on when when realizes this and to replace at her regular time the next day.    2. I informed Paige that if she can reach her lower back, above the wist she may use these sites for the patch as well. Paige will try this.    Paige will call or send a [employer] message next week to let us know how things are going.

## 2019-05-23 ENCOUNTER — TELEPHONE (OUTPATIENT)
Dept: PHARMACY | Facility: CLINIC | Age: 74
End: 2019-05-23

## 2019-05-23 NOTE — TELEPHONE ENCOUNTER
Informed Paige that the Bokplus and the Senna-docusate are the same and she can continue on the Bokplus. Paige wanted to know what [facility] would charge to have this delivered to her via mail. I informed her to call and ask lucille.

## 2019-05-23 NOTE — TELEPHONE ENCOUNTER
Patient called and left a  for [facility] pharmacist stating that she has had an increase in constipation. She has a BM once a week. She is requesting a call back to discuss.     Routing to RN care coordinators to discuss with the patient and then will provide recommendations as deemed necessary.     Zainab Sanches, Pharm.D.  Medication Therapy Management Pharmacist  Phone: [phone]

## 2019-05-23 NOTE — TELEPHONE ENCOUNTER
"Situation:  Complaints of constipation and having bowel movements once a week  Background:  Diagnosis: Parkinson's disease   Dr. Styles's note from 3/1/2019:  Use the senokot-S with the 1/2 cap of miralax    Patient is taking:  senna-docusate (SENOKOT-S/PERICOLACE) 8.6-50 MG tablet 7 tablet 11 3/1/2019  No   Sig: START 1 TAB BY MOUTH NIGHTLY @ 10PM   Not taking  polyethylene glycol (MIRALAX/GLYCOLAX) packet 7 packet  3/1/2019  No   Si/2 CAPFUL BY MOUTH NIGHTLY@9-10PM   Not taking    Takes bokplus 8.6-50 mg, 2 pills daily    Assessment:  -C/O constipation, one bowel movement a week for a month now, denies abdominal pain, straining when she goes  Stool features: Hard at the bottom and then is soft/mushy, Large, when she does go it is a lot    -Changes in diet? Was eating more bananna's but cut these out to help the constipation    -Paige wanted to know who to contact regarding a \"colon cleanse\" like the ones they do before a colonoscopy. I informed Paige that Dr. Styles does not do this here and I do not think she at this point yet. I informed her she can further discuss with her primary doctor if she would like to pursue this but should try utilizing the miralax and senna docusate first.    Plan:  1. Paige will take the senna-docusate and the miralax as prescribed    2. Paige will drink more water and eat prunes to combat constipation    3. Paige will continue to exercise and stay active    4. Paige will call and let us know if the 1 tablet of senna-docusate is not working, Dr. Styles to further decide if she should increase this    "

## 2019-06-01 ENCOUNTER — APPOINTMENT (OUTPATIENT)
Dept: GENERAL RADIOLOGY | Facility: CLINIC | Age: 74
End: 2019-06-01
Attending: EMERGENCY MEDICINE
Payer: MEDICARE

## 2019-06-01 ENCOUNTER — HOSPITAL ENCOUNTER (EMERGENCY)
Facility: CLINIC | Age: 74
Discharge: HOME | End: 2019-06-01
Attending: EMERGENCY MEDICINE | Admitting: EMERGENCY MEDICINE
Payer: MEDICARE

## 2019-06-01 ENCOUNTER — NURSE TRIAGE (OUTPATIENT)
Dept: NURSING | Facility: CLINIC | Age: 74
End: 2019-06-01

## 2019-06-01 VITALS
BODY MASS INDEX: 23.04 KG/M2 | DIASTOLIC BLOOD PRESSURE: 63 MMHG | HEART RATE: 87 BPM | TEMPERATURE: 98 F | SYSTOLIC BLOOD PRESSURE: 123 MMHG | WEIGHT: 130 LBS | RESPIRATION RATE: 18 BRPM | HEIGHT: 63 IN | OXYGEN SATURATION: 100 %

## 2019-06-01 DIAGNOSIS — K59.00 CONSTIPATION, UNSPECIFIED CONSTIPATION TYPE: ICD-10-CM

## 2019-06-01 PROCEDURE — 25000132 ZZH RX MED GY IP 250 OP 250 PS 637: Mod: GY

## 2019-06-01 PROCEDURE — 74019 RADEX ABDOMEN 2 VIEWS: CPT

## 2019-06-01 PROCEDURE — A9270 NON-COVERED ITEM OR SERVICE: HCPCS | Mod: GY

## 2019-06-01 PROCEDURE — 99283 EMERGENCY DEPT VISIT LOW MDM: CPT

## 2019-06-01 RX ORDER — SENNA AND DOCUSATE SODIUM 50; 8.6 MG/1; MG/1
1 TABLET, FILM COATED ORAL AT BEDTIME
Qty: 14 TABLET | Refills: 0 | Status: SHIPPED | OUTPATIENT
Start: 2019-06-01 | End: 2019-06-11

## 2019-06-01 ASSESSMENT — ENCOUNTER SYMPTOMS
CONSTIPATION: 1
BLOOD IN STOOL: 0
ABDOMINAL DISTENTION: 1
DIARRHEA: 0
ABDOMINAL PAIN: 0
VOMITING: 0
FEVER: 0
RECTAL PAIN: 0

## 2019-06-01 ASSESSMENT — MIFFLIN-ST. JEOR: SCORE: 1053.81

## 2019-06-01 NOTE — ED AVS SNAPSHOT
Emergency Department  [address] [place] [address]  Phone:  [phone]  Fax:  [phone]                                    Paige Mercado   MRN: [id number]    Department:   Emergency Department   Date of Visit:  6/1/2019           After Visit Summary Signature Page    I have received my discharge instructions, and my questions have been answered. I have discussed any challenges I see with this plan with the nurse or doctor.    ..........................................................................................................................................  Patient/Patient Representative Signature      ..........................................................................................................................................  Patient Representative Print Name and Relationship to Patient    ..................................................               ................................................  Date                                   Time    ..........................................................................................................................................  Reviewed by Signature/Title    ...................................................              ..............................................  Date                                               Time          22EPIC Rev 08/18

## 2019-06-01 NOTE — TELEPHONE ENCOUNTER
Patient reporting it has been a long time since she had a bowel movement. Patient has Parkinson's. Reporting last bowel movement over 4 days ago. Abdominal swelling. Denies abdominal pain. Afebrile. Patient has taken a stool softner and suppository with small results of soft stool. Stating she had tried to do a Fleet Enema and was unable to get it in.  Patient plans to have someone take her to [facility].    Caller verbalized understanding. Denies further questions.    Reina Vanegas RN  [facility]      Reason for Disposition    Abdomen is more swollen than usual    Additional Information    Negative: [1] Abdomen pain is main symptom AND [2] adult male    Negative: [1] Abdomen pain is main symptom AND [2] adult female    Negative: Rectal bleeding or blood in stool is main symptom    Negative: Rectal pain or itching is main symptom    Negative: Constipation in a cancer patient who is currently (or recently) receiving chemotherapy or radiation therapy, or cancer patient who has metastatic or end-stage cancer and is receiving palliative care    Negative: Patient sounds very sick or weak to the triager    Negative: [1] Vomiting AND [2] abdomen looks much more swollen than usual    Negative: [1] Vomiting AND [2] contains bile (green color)    Negative: [1] Constant abdominal pain AND [2] present > 2 hours    Negative: [1] Rectal pain or fullness from fecal impaction (rectum full of stool) AND [2] NOT better after SITZ bath, suppository or enema    Negative: [1] Intermittent mild abdominal pain AND [2] fever    Protocols used: CONSTIPATION-A-

## 2019-06-01 NOTE — ED PROVIDER NOTES
"  History     Chief Complaint:    Constipation      HPI   Paige Mercado is a 73 year old female with a history of constipation who presents with constipation.  She states her last normal bowel movement was approximately 2 weeks ago.  She has been having small amounts of stool since then.  She does take MiraLAX, suppositories, and senna.  She spoke with the nurses line today who advised her to come to the emergency department.  She has a history of Parkinson's disease and takes many medications that could be exacerbating the symptoms.  She states she eats a lot of fresh fruit but otherwise not a lot of fiber.  She is a chronic problem with constipation and takes MiraLAX normally.  She denies any abdominal pain, rectal pain, nausea vomiting, change in diet, urinary difficulties.  She does state that on her last colonoscopy 11 years ago she was told she had internal hemorrhoids however they have never given her a problem.  She attempted a enema at home but was unsuccessful.    Allergies:    Allergies   Allergen Reactions     Ciprofloxacin GI Disturbance     Ciprofloxacin      Other reaction(s): Intolerance-Can't Take  PN: LW Reaction: NERVOUSNESS       Sulfa Drugs GI Disturbance     Other reaction(s): Intolerance-Can't Take  PN: LW Reaction: GI Upset       Xanax [Alprazolam] Other (See Comments)     \"zombie-like\" and confused        Medications:        magnesium citrate solution   SENNA-docusate sodium (SENNA S) 8.6-50 MG tablet   acetaminophen (TYLENOL) 500 MG tablet   aspirin (ASPIRIN LOW DOSE) 81 MG tablet   carbidopa-levodopa (SINEMET)  MG tablet   Cholecalciferol (VITAMIN D) 2000 UNITS CAPS   citalopram (CELEXA) 40 MG tablet   levothyroxine (SYNTHROID) 50 MCG tablet   memantine (NAMENDA) 5 MG tablet   NONFORMULARY   NONFORMULARY   order for DME   polyethylene glycol (MIRALAX/GLYCOLAX) packet   QUEtiapine (SEROQUEL) 100 MG tablet   QUEtiapine (SEROQUEL) 25 MG tablet   rivastigmine (EXELON) 13.3 MG/24HR 24 hr " patch   rOPINIRole (REQUIP) 1 MG tablet   simvastatin (ZOCOR) 20 MG tablet   SUMAtriptan (IMITREX) 25 MG tablet       Problem List:      Patient Active Problem List    Diagnosis Date Noted     Hypothyroidism, unspecified type 11/14/2018     Priority: Medium     Personal history of malignant neoplasm of breast 10/10/2018     Priority: Medium     Mild cognitive impairment 04/04/2018     Priority: Medium     Anxiety 02/28/2018     Priority: Medium     H/O bone density study 02/28/2018     Priority: Medium     Overview:   2009 due again 2017       Hyperplastic polyps of stomach 02/28/2018     Priority: Medium     Overview:   due 2024 ( I do not see record of this in medical record or in scan doc-   reverify)        Parkinson disease (H) 02/28/2018     Priority: Medium     Overview:   11/2012       Prediabetes 02/28/2018     Priority: Medium     Overview:   A1C 5.7       S/P colonoscopy 02/28/2018     Priority: Medium     Overview:   last was 2014 due again in 2024       Depression 02/10/2016     Priority: Medium     HTN (hypertension), benign 02/10/2016     Priority: Medium     Migraine 02/10/2016     Priority: Medium     Overview:   Migraine  NOS       PD (Parkinson's disease) (H) 02/10/2016     Priority: Medium     Warthin's tumor 01/17/2013     Priority: Medium     Parkinson's disease (H) 01/05/2013     Priority: Medium     Parotid mass 11/16/2012     Priority: Medium     Overview:   1.5cm on open side MRI [address] [facility]  [phone] Fax        Generalized anxiety disorder 10/24/2012     Priority: Medium     Pyelonephritis 03/27/2008     Priority: Medium     Overview:   :  hospitalization  :  2007       Conversion disorder 01/20/2005     Priority: Medium     Overview:   LW Uncoded Problem, needs review: bone scan last was 2002  Overview:   LW Uncoded Problem, needs review: SI dysfunction       Osteoarthrosis 01/20/2005     Priority: Medium     Overview:   DJD       Allergic rhinitis 06/07/2003      "Priority: Medium     Overview:   Rhinitis Allergic  NOS       Essential hypertension 06/07/2003     Priority: Medium     Overview:   Hypertension       Hyperlipidemia 06/07/2003     Priority: Medium     Hypothyroidism 06/07/2003     Priority: Medium     Overview:   Hypothyroidism Acquired       Lumbago 06/07/2003     Priority: Medium     Overview:   Pain Low Back          Past Medical History:      Past Medical History:   Diagnosis Date     Cancer (H)        Past Surgical History:      Past Surgical History:   Procedure Laterality Date     ARTHROSCOPY SHOULDER       EYE SURGERY Right     scheduled right cataract 1st may 2018     EYE SURGERY Left     scheduled left cataract may 2018     GYN SURGERY      hysterectomy - Partial?     HEMORRHOIDECTOMY       MASTECTOMY Right     breast cancer  2000 or so     PAROTIDECTOMY Left     Warthis tumor 2012     TONSILLECTOMY      3 yrs of age       Family History:      Family History   Problem Relation Age of Onset     Lung Cancer Mother      Cancer Father         throat     Diabetes Father      Heart Disease Maternal Grandfather      Uterine Cancer Maternal Aunt      Breast Cancer Paternal Aunt      Other - See Comments Daughter         lives in [facility]. home schooling 2 of 5 kdis       Social History:    Marital Status:   [5]  Social History     Tobacco Use     Smoking status: Never Smoker     Smokeless tobacco: Never Used   Substance Use Topics     Alcohol use: Yes     Drug use: No        Review of Systems   Constitutional: Negative for fever.   Gastrointestinal: Positive for abdominal distention and constipation. Negative for abdominal pain, blood in stool, diarrhea, rectal pain and vomiting.   All other systems reviewed and are negative.        Physical Exam   First Vitals:  BP: 123/63  Pulse: 87  Temp: 98  F (36.7  C)  Resp: 18  Height: 160 cm (5' 3\")  Weight: 59 kg (130 lb)  SpO2: 100 %      Physical Exam    Physical Exam   Constitutional:  Patient is oriented " to person, place, and time. They appear well-developed and well-nourished.   HENT:   Mouth/Throat:   Oropharynx is clear and moist.   Eyes:    Conjunctivae normal and EOM are normal. Pupils are equal, round, and reactive to light.   Neck:    Normal range of motion.   Cardiovascular: Normal rate, regular rhythm and normal heart sounds.  Exam reveals no gallop and no friction rub.  No murmur heard.  Pulmonary/Chest:  Effort normal and breath sounds normal. Patient has no wheezes. Patient has no rales.   Abdominal:   Soft. Bowel sounds are normal. Patient exhibits no mass. There is no tenderness. There is no rebound and no guarding.   Musculoskeletal:  Normal range of motion. Patient exhibits no edema.   Neurological:   Patient is alert and oriented to person, place, and time. Patient has normal strength. No cranial nerve deficit or sensory deficit. GCS 15  Skin:   Skin is warm and dry. No rash noted. No erythema.   Psychiatric:   Flat affect.  Patient has a normal mood. Patient's behavior is normal.       Emergency Department Course   Imaging:  Radiographic findings were communicated with the patient who voiced understanding of the findings.  XR Abdomen 2 Views   Final Result   IMPRESSION: Nonobstructive bowel gas pattern with a large amount of   stool in the colon. No free air. Lung bases are clear.      GLENNA ALBERTS MD          Procedures:  Enema: Pink lady enema was performed with moderate results.          Emergency Department Course:  I reviewed the patient's chart.  Evaluated the patient.Patient was sent for imaging and then I discussed the options.  Patient consented for enema.       Impression & Plan      Medical Decision Making:    Paige Mercado is a 73 year old female who presents for evaluation for decreased stool output without signs of serious intraabdominal problems. Signs and symptoms are consistent with constipation. There are no signs at this point for a need for rectal disimpaction.  There are no  signs of obstruction nor other intraabdominal catastrophe.  Abdominal x-ray shows stool diffusely throughout the colon and rectum.  There are no indications for advanced imaging or admission.  Patient did have a pink lady enema here with moderate results.  I am going to send them home with instructions on medication management of this including a one-time dose of magnesium citrate and senna. They continue their MiraLAX once they are more completely cleaned out. Patient is agreeable with this and questions are answered.     Diagnosis:    ICD-10-CM    1. Constipation, unspecified constipation type K59.00        Disposition:  discharged to home    Discharge Medications:     Medication List      Started    magnesium citrate solution  296 mLs, Oral, ONCE        Modified    SENNA-docusate sodium 8.6-50 MG tablet  Commonly known as:  SENNA S  1 tablet, Oral, AT BEDTIME  What changed:      how much to take    how to take this    when to take this    additional instructions              Lesley Kaplan  6/1/2019    EMERGENCY DEPARTMENT       Lesley Kaplan MD  06/01/19 0956

## 2019-06-03 RX ORDER — QUETIAPINE FUMARATE 25 MG/1
25 TABLET, FILM COATED ORAL
COMMUNITY
Start: 2019-04-09 | End: 2019-06-11

## 2019-06-03 RX ORDER — MEMANTINE HYDROCHLORIDE 5 MG/1
10 TABLET ORAL
COMMUNITY
End: 2019-06-11

## 2019-06-03 RX ORDER — CHOLECALCIFEROL (VITAMIN D3) 10 MCG (400 UNIT) TABLET
1000
COMMUNITY
End: 2019-06-03

## 2019-06-03 NOTE — PROGRESS NOTES
Diagnosis/Summary/Recommendations:    PATIENT: Paige Mercado  73 year old female     : 1945    VLADIMIR: 2019    Has more jaw tremor. Not sure     Runny nose - not sure an anticholinergic medication is the best     Has left arm pulling up and has more tremor  Has more shaking at 3pm and has more anxiety     Has had problems with coming up with the word     Deborah is declining    Tired at 3pm and the best part of the day is when she wakes up in the morning and at 10pm when she is going to bed after taking her last dose.  She naps at 3-4pm  Gets back from exercise (3/week) at 130/2pm and is tired. She has food and then has wearing.   Parkinson [facility] [place]    Wondering about surgery    Constipation      oxypowder magnesium     Reduce pills    Jerking - myoclonic. May happen when standing    Head tremor.       Medications     7am 11am 3pm 8pm 10pm      Acetaminophen 500mg RARE                  Aspirin 81mg          1      Carbidopa/levodopa 25/100 Sinemet  2 2 2 2         Cholecalciferol vitamin d   1                  Citalopram celexa 40mg              1      Fluticasone flonase 50mcg/act nasal spray Not using              Levothyroxine synthroid 50 mcg 1                  MEMANTINE NAMENDA 10MG 1       1     Nonformulary runs smoothly tea SMOOTH MOVE               Polyethylene glycol miralax            As needed      Quetiapine 100mg seroquel             1 As needed   Quetiapine 25mg seroquel 1          1-2   and as needed   Rivastigmine exelon 13.3mg/24 hr 24 hr patch 1             Ropinirole requip 1mg 1 1 1            Senna-docusate senokot-S/pericolace 8.6-50 mg         Not taking     Simvastatin zocor 20mg             1      Sumatriptan imitrex 25mg As needed                  Vitamin D3 cholecalciferol 1000 units 1                                           History obtained from patient    Questions - does she need aspirin or vitamin D    145mg rytary 3 capsules by mouth 3/day -  option  Versus using stalevo 200 or CR    PLAN  Move the 3pm dose sooner    Use the magnesium only one for constipation    Return back       Coding statement:   Duration of  Services: patient care and care coordination was 25 minutes  Greater than 50% of this visit was spent in counseling and coordination of care.     Keith Styles MD     ______________________________________    Last visit date and details:      VLADIMIR: March 1, 2019     MATT BROUGHT HER DOWN  MATT IS FROM [place] AND DROVE HER IN.     QUESTIONS     BLOOD PRESSURE - STOPPED HER MEDICATION PRIOR TO LAST VISIT - TODAY'S /74  RUNNY NOSE  RYTARY - WONDERING ABOUT T HIS MEDICATION.   HAS SOME TWITCHING - MYOCLONUS WHEN STANDING  HER HANDWRITING IS WORSE  SHE ASKED ABOUT LISINOPRIL BLOOD PRESSURE MEDICATION        Medications     7am 11am 3pm 8pm 10pm      Acetaminophen 500mg RARE                  Aspirin 81mg 1                  Carbidopa/levodopa 25/100 Sinemet  2 2 2 2         Cholecalciferol vitamin d2000 1                  Citalopram celexa 40mg              1      Fluticasone flonase 50mcg/act nasall spray               Levothyroxine synthroid 50 mcg 1                  MEMANTINE NAMENDA 5MG  2?        2?     Nonformulary runs smoothly tea SMOOTH MOVE               Polyethylene glycol miralax            1/2CAP      Quetiapine 100mg seroquel             1      Quetiapine 25mg seroquel 1          1   and as needed   Rivastigmine exelon 13.3mg/24 hr 24 hr patch 1             Ropinirole requip 1mg 1 1 1            Senna-docusate senokot-S/pericolace 8.6-50 mg         start     Simvastatin zocor 20mg             1      Sumatriptan imitrex 25mg As needed                                                                   History obtained from patient      PLAN  1. Continue her medication regimen. There has been benefit from the namenda and the rivastigmine patch  She has been ramping up on the nameda and will be on the full dose of the namenda this  "Sunday  She is on 13.3 of the rivastigmine patch     2. The rytary is very expensive     3. Use the senokot-S with the 1/2 cap of miralax     4. Anxiety. Consider 25mg of seroquel as needed plus the scheduled dose. For anxiety     5. Return appointment. With me in June and Alyssa in July      Keith         ______________________________________      Patient was asked about 14 Review of systems including changes in vision (dry eyes, double vision), hearing, heart, lungs, musculoskeletal, depression, anxiety, snoring, RBD, insomnia, urinary frequency, urinary urgency, constipation, swallowing problems, hematological, ID, allergies, skin problems: seborrhea, endocrinological: thyroid, diabetes, cholesterol; balance, weight changes, and other neurological problems and these were not significant at this time except for   Patient Active Problem List   Diagnosis     Allergic rhinitis     Anxiety     Conversion disorder     Depression     Essential hypertension     Generalized anxiety disorder     H/O bone density study     HTN (hypertension), benign     Hyperlipidemia     Hyperplastic polyps of stomach     Hypothyroidism     Lumbago     Migraine     Osteoarthrosis     Parkinson disease (H)     Parkinson's disease (H)     Parotid mass     PD (Parkinson's disease) (H)     Pyelonephritis     Prediabetes     S/P colonoscopy     Warthin's tumor     Mild cognitive impairment     Personal history of malignant neoplasm of breast     Hypothyroidism, unspecified type          Allergies   Allergen Reactions     Ciprofloxacin GI Disturbance     Ciprofloxacin      Other reaction(s): Intolerance-Can't Take  PN: LW Reaction: NERVOUSNESS    Other reaction(s): Intolerance-Can't Take     Sulfa Drugs GI Disturbance     Other reaction(s): Intolerance-Can't Take  PN: LW Reaction: GI Upset    Other reaction(s): Intolerance-Can't Take     Xanax [Alprazolam] Other (See Comments)     \"zombie-like\" and confused     Past Surgical History:   Procedure " Laterality Date     ARTHROSCOPY SHOULDER       EYE SURGERY Right     scheduled right cataract 1st may 2018     EYE SURGERY Left     scheduled left cataract may 2018     GYN SURGERY      hysterectomy - Partial?     HEMORRHOIDECTOMY       MASTECTOMY Right     breast cancer  2000 or so     PAROTIDECTOMY Left     Warthis tumor 2012     TONSILLECTOMY      3 yrs of age     Past Medical History:   Diagnosis Date     Cancer (H)     breast     Social History     Socioeconomic History     Marital status:      Spouse name: Not on file     Number of children: Not on file     Years of education: Not on file     Highest education level: Not on file   Occupational History     Not on file   Social Needs     Financial resource strain: Not on file     Food insecurity:     Worry: Not on file     Inability: Not on file     Transportation needs:     Medical: Not on file     Non-medical: Not on file   Tobacco Use     Smoking status: Never Smoker     Smokeless tobacco: Never Used   Substance and Sexual Activity     Alcohol use: Yes     Drug use: No     Sexual activity: Never   Lifestyle     Physical activity:     Days per week: Not on file     Minutes per session: Not on file     Stress: Not on file   Relationships     Social connections:     Talks on phone: Not on file     Gets together: Not on file     Attends Baptism service: Not on file     Active member of club or organization: Not on file     Attends meetings of clubs or organizations: Not on file     Relationship status: Not on file     Intimate partner violence:     Fear of current or ex partner: Not on file     Emotionally abused: Not on file     Physically abused: Not on file     Forced sexual activity: Not on file   Other Topics Concern     Parent/sibling w/ CABG, MI or angioplasty before 65F 55M? No   Social History Narrative    lives in [facility]    [place] is daughter       Drug and lactation database from the [place] [employer]  Medicine:  http://toxnet.nlm.nih.gov/cgi-bin/sis/htmlgen?LACT      B/P: Data Unavailable, T: Data Unavailable, P: Data Unavailable, R: Data Unavailable 0 lbs 0 oz  not currently breastfeeding., There is no height or weight on file to calculate BMI.  Medications and Vitals not listed above were documented in the cart and reviewed by me.     Current Outpatient Medications   Medication Sig Dispense Refill     QUEtiapine (SEROQUEL) 25 MG tablet Take 25 mg by mouth       acetaminophen (TYLENOL) 500 MG tablet Take 1 tablet (500 mg) by mouth every 4 hours as needed for mild pain       aspirin (ASPIRIN LOW DOSE) 81 MG tablet 81mg tablet @ NIGHT 30 tablet      carbidopa-levodopa (SINEMET)  MG tablet 2 tabs @ 7am, 11am, 3pm and 8pm and 1 tab as needed 810 tablet 3     Cholecalciferol (DELTA D3) 400 units TABS Take 1,000 Units by mouth       Cholecalciferol (VITAMIN D) 2000 UNITS CAPS 2000 units @ 7am 30 capsule      citalopram (CELEXA) 40 MG tablet 40 mg tablet by mouth @ 10pm 90 tablet 3     levothyroxine (SYNTHROID) 50 MCG tablet 50mcg tablet by mouth @ 7am 90 tablet 3     memantine (NAMENDA) 5 MG tablet Take 10 mg by mouth       memantine (NAMENDA) 5 MG tablet 2 X 5MG TABS BY MOUTH TWICE DAILY @ 7AM AND 10PM 360 tablet 3     NONFORMULARY Full spectrum - v light       NONFORMULARY Runs smoothly tea from cub between 9-10pm       order for DME Equipment being ordered: Vie light    Intranasal light therapy is to use the therapeutic effects of light on the body and brain via the nasal cavity. This is made possible through the use of a device that emits 810 nanometers of red infrared light. A user has to insert and clip on the light-emitting part of the device in one of his or her nostrils and leave it on for a specific amount of time. 1 Device 11     polyethylene glycol (MIRALAX/GLYCOLAX) packet 1/2 CAPFUL BY MOUTH NIGHTLY@9-10PM 7 packet      QUEtiapine (SEROQUEL) 100 MG tablet 100mg tablet @ 10pm; takes with a 25mg  tablet 60 tablet 11     QUEtiapine (SEROQUEL) 25 MG tablet 1 tab @ 7am and 2 tabs @ 10pm (along with 100mg at bedtime) and 25mg tab as needed 360 tablet 3     rivastigmine (EXELON) 13.3 MG/24HR 24 hr patch Place 1 patch onto the skin daily 90 patch 3     rOPINIRole (REQUIP) 1 MG tablet 1mg tablet by mouth @ 7am, 11am and 3pm 270 tablet 3     SENNA-docusate sodium (SENNA S) 8.6-50 MG tablet Take 1 tablet by mouth At Bedtime 14 tablet 0     simvastatin (ZOCOR) 20 MG tablet 20mg tablet by mouth @ 10pm 90 tablet 3     SUMAtriptan (IMITREX) 25 MG tablet Take 1 tablet (25 mg) by mouth at onset of headache for migraine 30 tablet          Keith Styles MD

## 2019-06-11 ENCOUNTER — OFFICE VISIT (OUTPATIENT)
Dept: NEUROLOGY | Facility: CLINIC | Age: 74
End: 2019-06-11
Attending: PSYCHIATRY & NEUROLOGY
Payer: MEDICARE

## 2019-06-11 VITALS
HEART RATE: 83 BPM | DIASTOLIC BLOOD PRESSURE: 66 MMHG | WEIGHT: 128.6 LBS | SYSTOLIC BLOOD PRESSURE: 128 MMHG | BODY MASS INDEX: 22.79 KG/M2 | HEIGHT: 63 IN | OXYGEN SATURATION: 98 %

## 2019-06-11 DIAGNOSIS — R41.3 MEMORY PROBLEM: ICD-10-CM

## 2019-06-11 DIAGNOSIS — G20.A1 PARKINSON DISEASE (H): Primary | ICD-10-CM

## 2019-06-11 DIAGNOSIS — G24.4 OROFACIAL DYSKINESIA: ICD-10-CM

## 2019-06-11 DIAGNOSIS — G20.C PARKINSONISM, UNSPECIFIED PARKINSONISM TYPE (H): ICD-10-CM

## 2019-06-11 DIAGNOSIS — G20.A1 PARKINSON'S DISEASE (H): ICD-10-CM

## 2019-06-11 RX ORDER — MEMANTINE HYDROCHLORIDE 10 MG/1
TABLET ORAL
Qty: 180 TABLET | Refills: 3 | Status: SHIPPED | OUTPATIENT
Start: 2019-06-11 | End: 2019-09-17

## 2019-06-11 RX ORDER — ROPINIROLE 1 MG/1
TABLET, FILM COATED ORAL
Qty: 270 TABLET | Refills: 3 | Status: SHIPPED | OUTPATIENT
Start: 2019-06-11 | End: 2020-01-01

## 2019-06-11 RX ORDER — RIVASTIGMINE 13.3 MG/24H
1 PATCH, EXTENDED RELEASE TRANSDERMAL DAILY
Qty: 90 PATCH | Refills: 3 | Status: SHIPPED | OUTPATIENT
Start: 2019-06-11 | End: 2019-09-30

## 2019-06-11 RX ORDER — QUETIAPINE FUMARATE 25 MG/1
TABLET, FILM COATED ORAL
COMMUNITY
Start: 2019-06-11 | End: 2019-08-23

## 2019-06-11 ASSESSMENT — MIFFLIN-ST. JEOR: SCORE: 1047.46

## 2019-06-11 ASSESSMENT — PAIN SCALES - GENERAL: PAINLEVEL: NO PAIN (0)

## 2019-06-11 NOTE — NURSING NOTE
Chief Complaint   Patient presents with     Parkinson     [facility] RETURN - 3 MONTH FOLLOW UP       Doug Shanks, EMT

## 2019-06-11 NOTE — LETTER
2019       RE: Paige Mercado   [address] [place] [address]     Dear Colleague,    Thank you for referring your patient, Paige Mercado, to the [facility] NEUROLOGY at [facility]. Please see a copy of my visit note below.    Diagnosis/Summary/Recommendations:    PATIENT: Paige Mercado  73 year old female     : 1945    VLADIMIR: 2019    Has more jaw tremor. Not sure     Runny nose - not sure an anticholinergic medication is the best     Has left arm pulling up and has more tremor  Has more shaking at 3pm and has more anxiety     Has had problems with coming up with the word     Penmanship is declining    Tired at 3pm and the best part of the day is when she wakes up in the morning and at 10pm when she is going to bed after taking her last dose.  She naps at 3-4pm  Gets back from exercise (3/week) at 130/2pm and is tired. She has food and then has wearing.   Parkinson class [facility]    Wondering about surgery    Constipation      oxypowder magnesium     Reduce pills    Jerking - myoclonic. May happen when standing    Head tremor.       Medications     7am 11am 3pm 8pm 10pm      Acetaminophen 500mg RARE                  Aspirin 81mg          1      Carbidopa/levodopa 25/100 Sinemet  2 2 2 2         Cholecalciferol vitamin d   1                  Citalopram celexa 40mg              1      Fluticasone flonase 50mcg/act nasal spray Not using              Levothyroxine synthroid 50 mcg 1                  MEMANTINE NAMENDA 10MG 1       1     Nonformulary runs smoothly tea SMOOTH MOVE               Polyethylene glycol miralax            As needed      Quetiapine 100mg seroquel             1 As needed   Quetiapine 25mg seroquel 1          1-2   and as needed   Rivastigmine exelon 13.3mg/24 hr 24 hr patch 1             Ropinirole requip 1mg 1 1 1            Senna-docusate senokot-S/pericolace 8.6-50 mg         Not taking     Simvastatin zocor  20mg             1      Sumatriptan imitrex 25mg As needed                  Vitamin D3 cholecalciferol 1000 units 1                                           History obtained from patient    Questions - does she need aspirin or vitamin D    145mg rytary 3 capsules by mouth 3/day - option  Versus using stalevo 200 or CR    PLAN  Move the 3pm dose sooner    Use the magnesium only one for constipation    Return back       Coding statement:   Duration of  Services: patient care and care coordination was 25 minutes  Greater than 50% of this visit was spent in counseling and coordination of care.     Keith Styles MD     ______________________________________    Last visit date and details:      VLADIMIR: March 1, 2019     MATT BROUGHT HER DOWN  MATT IS FROM [place] AND DROVE HER IN.     QUESTIONS     BLOOD PRESSURE - STOPPED HER MEDICATION PRIOR TO LAST VISIT - TODAY'S /74  RUNNY NOSE  RYTARY - WONDERING ABOUT T HIS MEDICATION.   HAS SOME TWITCHING - MYOCLONUS WHEN STANDING  HER HANDWRITING IS WORSE  SHE ASKED ABOUT LISINOPRIL BLOOD PRESSURE MEDICATION        Medications     7am 11am 3pm 8pm 10pm      Acetaminophen 500mg RARE                  Aspirin 81mg 1                  Carbidopa/levodopa 25/100 Sinemet  2 2 2 2         Cholecalciferol vitamin d2000 1                  Citalopram celexa 40mg              1      Fluticasone flonase 50mcg/act nasall spray               Levothyroxine synthroid 50 mcg 1                  MEMANTINE NAMENDA 5MG  2?        2?     Nonformulary runs smoothly tea SMOOTH MOVE               Polyethylene glycol miralax            1/2CAP      Quetiapine 100mg seroquel             1      Quetiapine 25mg seroquel 1          1   and as needed   Rivastigmine exelon 13.3mg/24 hr 24 hr patch 1             Ropinirole requip 1mg 1 1 1            Senna-docusate senokot-S/pericolace 8.6-50 mg         start     Simvastatin zocor 20mg             1      Sumatriptan imitrex 25mg As needed                                                                    History obtained from patient      PLAN  1. Continue her medication regimen. There has been benefit from the namenda and the rivastigmine patch  She has been ramping up on the nameda and will be on the full dose of the namenda this Sunday  She is on 13.3 of the rivastigmine patch     2. The rytary is very expensive     3. Use the senokot-S with the 1/2 cap of miralax     4. Anxiety. Consider 25mg of seroquel as needed plus the scheduled dose. For anxiety     5. Return appointment. With me in June and Alyssa in July      Keith         ______________________________________      Patient was asked about 14 Review of systems including changes in vision (dry eyes, double vision), hearing, heart, lungs, musculoskeletal, depression, anxiety, snoring, RBD, insomnia, urinary frequency, urinary urgency, constipation, swallowing problems, hematological, ID, allergies, skin problems: seborrhea, endocrinological: thyroid, diabetes, cholesterol; balance, weight changes, and other neurological problems and these were not significant at this time except for   Patient Active Problem List   Diagnosis     Allergic rhinitis     Anxiety     Conversion disorder     Depression     Essential hypertension     Generalized anxiety disorder     H/O bone density study     HTN (hypertension), benign     Hyperlipidemia     Hyperplastic polyps of stomach     Hypothyroidism     Lumbago     Migraine     Osteoarthrosis     Parkinson disease (H)     Parkinson's disease (H)     Parotid mass     PD (Parkinson's disease) (H)     Pyelonephritis     Prediabetes     S/P colonoscopy     Warthin's tumor     Mild cognitive impairment     Personal history of malignant neoplasm of breast     Hypothyroidism, unspecified type          Allergies   Allergen Reactions     Ciprofloxacin GI Disturbance     Ciprofloxacin      Other reaction(s): Intolerance-Can't Take  PN: LW Reaction: NERVOUSNESS    Other reaction(s):  "Intolerance-Can't Take     Sulfa Drugs GI Disturbance     Other reaction(s): Intolerance-Can't Take  PN: LW Reaction: GI Upset    Other reaction(s): Intolerance-Can't Take     Xanax [Alprazolam] Other (See Comments)     \"zombie-like\" and confused     Past Surgical History:   Procedure Laterality Date     ARTHROSCOPY SHOULDER       EYE SURGERY Right     scheduled right cataract 1st may 2018     EYE SURGERY Left     scheduled left cataract may 2018     GYN SURGERY      hysterectomy - Partial?     HEMORRHOIDECTOMY       MASTECTOMY Right     breast cancer  2000 or so     PAROTIDECTOMY Left     [place] tumor 2012     TONSILLECTOMY      3 yrs of age     Past Medical History:   Diagnosis Date     Cancer (H)     breast     Social History     Socioeconomic History     Marital status:      Spouse name: Not on file     Number of children: Not on file     Years of education: Not on file     Highest education level: Not on file   Occupational History     Not on file   Social Needs     Financial resource strain: Not on file     Food insecurity:     Worry: Not on file     Inability: Not on file     Transportation needs:     Medical: Not on file     Non-medical: Not on file   Tobacco Use     Smoking status: Never Smoker     Smokeless tobacco: Never Used   Substance and Sexual Activity     Alcohol use: Yes     Drug use: No     Sexual activity: Never   Lifestyle     Physical activity:     Days per week: Not on file     Minutes per session: Not on file     Stress: Not on file   Relationships     Social connections:     Talks on phone: Not on file     Gets together: Not on file     Attends Zoroastrianism service: Not on file     Active member of club or organization: Not on file     Attends meetings of clubs or organizations: Not on file     Relationship status: Not on file     Intimate partner violence:     Fear of current or ex partner: Not on file     Emotionally abused: Not on file     Physically abused: Not on file     Forced " sexual activity: Not on file   Other Topics Concern     Parent/sibling w/ CABG, MI or angioplasty before 65F 55M? No   Social History Narrative    lives in [facility]    Ibis Bethea is daughter       Drug and lactation database from the [place] National Library of Medicine:  http://toxnet.nlm.nih.gov/cgi-bin/sis/htmlgen?LACT      B/P: Data Unavailable, T: Data Unavailable, P: Data Unavailable, R: Data Unavailable 0 lbs 0 oz  not currently breastfeeding., There is no height or weight on file to calculate BMI.  Medications and Vitals not listed above were documented in the cart and reviewed by me.     Current Outpatient Medications   Medication Sig Dispense Refill     QUEtiapine (SEROQUEL) 25 MG tablet Take 25 mg by mouth       acetaminophen (TYLENOL) 500 MG tablet Take 1 tablet (500 mg) by mouth every 4 hours as needed for mild pain       aspirin (ASPIRIN LOW DOSE) 81 MG tablet 81mg tablet @ NIGHT 30 tablet      carbidopa-levodopa (SINEMET)  MG tablet 2 tabs @ 7am, 11am, 3pm and 8pm and 1 tab as needed 810 tablet 3     Cholecalciferol (DELTA D3) 400 units TABS Take 1,000 Units by mouth       Cholecalciferol (VITAMIN D) 2000 UNITS CAPS 2000 units @ 7am 30 capsule      citalopram (CELEXA) 40 MG tablet 40 mg tablet by mouth @ 10pm 90 tablet 3     levothyroxine (SYNTHROID) 50 MCG tablet 50mcg tablet by mouth @ 7am 90 tablet 3     memantine (NAMENDA) 5 MG tablet Take 10 mg by mouth       memantine (NAMENDA) 5 MG tablet 2 X 5MG TABS BY MOUTH TWICE DAILY @ 7AM AND 10PM 360 tablet 3     NONFORMULARY Full spectrum - v light       NONFORMULARY Runs smoothly tea from cub between 9-10pm       order for DME Equipment being ordered: Vie light    Intranasal light therapy is to use the therapeutic effects of light on the body and brain via the nasal cavity. This is made possible through the use of a device that emits 810 nanometers of red infrared light. A user has to insert and clip on the light-emitting part of  the device in one of his or her nostrils and leave it on for a specific amount of time. 1 Device 11     polyethylene glycol (MIRALAX/GLYCOLAX) packet 1/2 CAPFUL BY MOUTH NIGHTLY@9-10PM 7 packet      QUEtiapine (SEROQUEL) 100 MG tablet 100mg tablet @ 10pm; takes with a 25mg tablet 60 tablet 11     QUEtiapine (SEROQUEL) 25 MG tablet 1 tab @ 7am and 2 tabs @ 10pm (along with 100mg at bedtime) and 25mg tab as needed 360 tablet 3     rivastigmine (EXELON) 13.3 MG/24HR 24 hr patch Place 1 patch onto the skin daily 90 patch 3     rOPINIRole (REQUIP) 1 MG tablet 1mg tablet by mouth @ 7am, 11am and 3pm 270 tablet 3     SENNA-docusate sodium (SENNA S) 8.6-50 MG tablet Take 1 tablet by mouth At Bedtime 14 tablet 0     simvastatin (ZOCOR) 20 MG tablet 20mg tablet by mouth @ 10pm 90 tablet 3     SUMAtriptan (IMITREX) 25 MG tablet Take 1 tablet (25 mg) by mouth at onset of headache for migraine 30 tablet          Keith Styles MD

## 2019-06-11 NOTE — PATIENT INSTRUCTIONS
VLADIMIR: June 11, 2019    Has more jaw tremor. Not sure     Runny nose - not sure an anticholinergic medication is the best     Has left arm pulling up and has more tremor  Has more shaking at 3pm and has more anxiety     Has had problems with coming up with the word     Deborah is declining    Tired at 3pm and the best part of the day is when she wakes up in the morning and at 10pm when she is going to bed after taking her last dose.  She naps at 3-4pm  Gets back from exercise (3/week) at 130/2pm and is tired. She has food and then has wearing.   Parkinson class [facility]    Wondering about surgery    Constipation      oxypowder magnesium     Reduce pills    Jerking - myoclonic. May happen when standing    Head tremor.             Medications     7am 11am 3pm 8pm 10pm      Acetaminophen 500mg RARE                  Aspirin 81mg          1      Carbidopa/levodopa 25/100 Sinemet  2 2 2 2         Cholecalciferol vitamin d 2000  1                  Citalopram celexa 40mg              1      Fluticasone flonase 50mcg/act nasal spray Not using              Levothyroxine synthroid 50 mcg 1                  MEMANTINE NAMENDA 10MG 1       1     Nonformulary runs smoothly tea SMOOTH MOVE               Polyethylene glycol miralax            As needed      Quetiapine 100mg seroquel             1 As needed   Quetiapine 25mg seroquel 1          1-2   and as needed   Rivastigmine exelon 13.3mg/24 hr 24 hr patch 1             Ropinirole requip 1mg 1 1 1            Senna-docusate senokot-S/pericolace 8.6-50 mg         Not taking     Simvastatin zocor 20mg             1      Sumatriptan imitrex 25mg As needed                  Vitamin D3 cholecalciferol 1000 units 1                                           History obtained from patient    Questions - does she need aspirin or vitamin D    145mg rytary 3 capsules by mouth 3/day - option  Versus using stalevo 200 or CR    PLAN  Move the 3pm dose sooner    Use the magnesium only  one for constipation    Return back

## 2019-06-18 ENCOUNTER — TELEPHONE (OUTPATIENT)
Dept: PHARMACY | Facility: CLINIC | Age: 74
End: 2019-06-18

## 2019-06-18 NOTE — TELEPHONE ENCOUNTER
Patient called [facility] and was informed that she would receive a USPS return label in the mail to send the rivastigmine patches back in order to get a reversed claim. I advised her to involve her daughter in this process to ensure the prescription is appropriately returned and the patient agreed.    Zainab Sanches, Pharm.D.  Medication Therapy Management Pharmacist  Phone: [phone]

## 2019-06-18 NOTE — TELEPHONE ENCOUNTER
Patient states that she received a new prescription in the mail for rivastigmine patch after her visit with Dr. Styles and she was charged $233. She typically gets the rivastigmine patch from her local pharmacy NOT [facility] pharmacy because she cannot use the Assistance Fund with [facility].     [facility] pharmacist called [facility] and they suggested that the patient call the Clinical Services department at [phone] and initiate a return/refund. Informed patient of this and she will call [facility] back today.    Zainab Sanches, Pharm.D.  Medication Therapy Management Pharmacist  Phone: [phone]

## 2019-07-01 ENCOUNTER — TELEPHONE (OUTPATIENT)
Dept: NEUROLOGY | Facility: CLINIC | Age: 74
End: 2019-07-01

## 2019-07-01 ENCOUNTER — TELEPHONE (OUTPATIENT)
Dept: PHARMACY | Facility: CLINIC | Age: 74
End: 2019-07-01

## 2019-07-01 NOTE — TELEPHONE ENCOUNTER
Received 3 voicemails from the patient, inquiring about how she should get her rivastigmine patches. She previously has obtained the patches from [facility] pharmacy but a prescription was inadvertently sent to [facility] and she had received a supply from [facility] which cost her >$200. She gets a discounted rate through [facility] with the Assistance Fund. See telephone encounter dated 6/18/19 for more information.     Spoke with the patient's daughter, Rosalva, as well. She states that the patient received the return label for rivastigmine on 6/28/19 and now the patient only has 2 remaining rivastigmine patches.     Spoke with [facility]. They do not have rivastigmine in stock but will order it for tomorrow. They will call the patient's insurance to get an override tomorrow after the medication arrives in stock.     Spoke with [facility]. I was informed that the initial fill of medications are automatically sent out to the patient if the cost is <$275. They cancelled all refills of rivastigmine at [facility] and can pursue the return as previously planned.     Called daughter, Rosalva, again and advised the following:    [facility] will order rivastigmine and process through insurance for tomorrow 7/1/19    If this is done successfully, the patient can return rivastigmine to [facility] using shipping label that she now has    There are no refills of rivastigmine remaining at [facility]    Zainab Sanches, Pharm.D.  Medication Therapy Management Pharmacist  Phone: [phone]

## 2019-07-01 NOTE — TELEPHONE ENCOUNTER
[facility]    Phone Message    May a detailed message be left on voicemail: yes    Reason for Call: Other: Pt calling stating that she needs some type of letter stating that Dr. Styles is her doctor. She wasn't able to say who needs it. Please call back to discuss.     Action Taken: Message routed to:  [facility] ([facility]

## 2019-07-02 ENCOUNTER — TELEPHONE (OUTPATIENT)
Dept: PHARMACY | Facility: CLINIC | Age: 74
End: 2019-07-02

## 2019-07-02 NOTE — TELEPHONE ENCOUNTER
Received voicemail from patient's daughter, Rosalva, regarding rivastigmine patch from [facility]. Spoke with Clemente and Rosalva and the issue was resolved.     Zainab Sanches, Pharm.D.  Medication Therapy Management Pharmacist  Phone: [phone]

## 2019-07-02 NOTE — TELEPHONE ENCOUNTER
Spoke to patient.  She takes classes at [employer] [place]. She would like to go for the exercise garth through the [employer].    Patient would like to have me email the doctor's portion of the garth form to her.    The signed form was emailed to [email or web address] at 1:12 pm per her request.

## 2019-07-31 ENCOUNTER — MYC MEDICAL ADVICE (OUTPATIENT)
Dept: PHARMACY | Facility: CLINIC | Age: 74
End: 2019-07-31

## 2019-07-31 ENCOUNTER — OFFICE VISIT (OUTPATIENT)
Dept: PEDIATRICS | Facility: CLINIC | Age: 74
End: 2019-07-31
Payer: MEDICARE

## 2019-07-31 VITALS
OXYGEN SATURATION: 99 % | HEART RATE: 86 BPM | BODY MASS INDEX: 22.32 KG/M2 | WEIGHT: 126 LBS | DIASTOLIC BLOOD PRESSURE: 66 MMHG | SYSTOLIC BLOOD PRESSURE: 128 MMHG | HEIGHT: 63 IN | TEMPERATURE: 98 F

## 2019-07-31 DIAGNOSIS — F41.9 ANXIETY: ICD-10-CM

## 2019-07-31 DIAGNOSIS — Z13.9 SCREENING FOR CONDITION: Primary | ICD-10-CM

## 2019-07-31 DIAGNOSIS — R25.1 TREMOR: ICD-10-CM

## 2019-07-31 DIAGNOSIS — R41.0 CONFUSION: ICD-10-CM

## 2019-07-31 DIAGNOSIS — G31.84 MILD COGNITIVE IMPAIRMENT: ICD-10-CM

## 2019-07-31 DIAGNOSIS — R42 DIZZINESS: ICD-10-CM

## 2019-07-31 LAB
ALBUMIN UR-MCNC: NEGATIVE MG/DL
APPEARANCE UR: CLEAR
BILIRUB UR QL STRIP: NEGATIVE
COLOR UR AUTO: YELLOW
ERYTHROCYTE [DISTWIDTH] IN BLOOD BY AUTOMATED COUNT: 13.5 % (ref 10–15)
GLUCOSE UR STRIP-MCNC: NEGATIVE MG/DL
HCT VFR BLD AUTO: 41 % (ref 35–47)
HGB BLD-MCNC: 12.9 G/DL (ref 11.7–15.7)
HGB UR QL STRIP: NEGATIVE
KETONES UR STRIP-MCNC: 15 MG/DL
LEUKOCYTE ESTERASE UR QL STRIP: ABNORMAL
MCH RBC QN AUTO: 30.1 PG (ref 26.5–33)
MCHC RBC AUTO-ENTMCNC: 31.5 G/DL (ref 31.5–36.5)
MCV RBC AUTO: 96 FL (ref 78–100)
NITRATE UR QL: NEGATIVE
NON-SQ EPI CELLS #/AREA URNS LPF: ABNORMAL /LPF
PH UR STRIP: 7 PH (ref 5–7)
PLATELET # BLD AUTO: 340 10E9/L (ref 150–450)
RBC # BLD AUTO: 4.28 10E12/L (ref 3.8–5.2)
RBC #/AREA URNS AUTO: ABNORMAL /HPF
SOURCE: ABNORMAL
SP GR UR STRIP: 1.02 (ref 1–1.03)
UROBILINOGEN UR STRIP-ACNC: 4 EU/DL (ref 0.2–1)
WBC # BLD AUTO: 7.5 10E9/L (ref 4–11)
WBC #/AREA URNS AUTO: ABNORMAL /HPF

## 2019-07-31 PROCEDURE — 80053 COMPREHEN METABOLIC PANEL: CPT | Performed by: NURSE PRACTITIONER

## 2019-07-31 PROCEDURE — 84443 ASSAY THYROID STIM HORMONE: CPT | Performed by: NURSE PRACTITIONER

## 2019-07-31 PROCEDURE — 85027 COMPLETE CBC AUTOMATED: CPT | Performed by: NURSE PRACTITIONER

## 2019-07-31 PROCEDURE — 36415 COLL VENOUS BLD VENIPUNCTURE: CPT | Performed by: NURSE PRACTITIONER

## 2019-07-31 PROCEDURE — 81001 URINALYSIS AUTO W/SCOPE: CPT | Performed by: NURSE PRACTITIONER

## 2019-07-31 PROCEDURE — 99214 OFFICE O/P EST MOD 30 MIN: CPT | Performed by: NURSE PRACTITIONER

## 2019-07-31 PROCEDURE — 93000 ELECTROCARDIOGRAM COMPLETE: CPT | Performed by: NURSE PRACTITIONER

## 2019-07-31 ASSESSMENT — MIFFLIN-ST. JEOR: SCORE: 1030.66

## 2019-07-31 NOTE — PROGRESS NOTES
Subjective     Paige Mercado is a 74 year old female who presents to clinic today with daughter for the following health issues:    HPI   Abnormal Mood Symptoms  Onset: 1 month - could be life long     Description:   Depression: no   Anxiety: YES    Accompanying Signs & Symptoms:  Still participating in activities that you used to enjoy: no  Fatigue: YES  Irritability: no   Difficulty concentrating: YES  Changes in appetite: no   Problems with sleep: YES- waking up more and some sleep less nights  Heart racing/beating fast : YES  Thoughts of hurting yourself or others: none    History:   Recent stress: no   Prior depression hospitalization: None  Family history of depression: no   Family history of anxiety: YES- parents     Precipitating factors:   Alcohol/drug use: no     Alleviating factors:Nothing     Therapies Tried and outcome: None    Hx of Parkinson's, anxiety, depression, and mild cognitive impairment. Followed by Neuro (ealth-Dr Styles) and Psych (Geetha Carrion).  Dr. Styles recently increased Seroquel dosing, added afternoon dose around 3 pm due to increased anxiety/panic symptoms/tremor about 1 month ago. Also had been titrating/increasing Namenda, now at max dose.  Despite dose increase of seroquel, daughter reports pt has increased anxiety with tremor, also will feel a little dizzy with increased anxiety. Also noted ongoing confusion. Worried it could be from Namenda.  Denies chest pain, lightheadedness, fatigue, headache.    Message about her above symptoms were sent to Dr. Styles's office as the concern is her ongoing symptoms are from Namenda. Recommendation was to rule out infectious process, check thyroid. If no abnormalities found, to see neurology for discussion of med management.    Reviewed and updated as needed this visit by Provider  Meds  Problems         Review of Systems   ROS COMP: Constitutional, HEENT, cardiovascular, pulmonary, GI, , musculoskeletal, neuro, skin, endocrine and psych  "systems are negative, except as otherwise noted.      Objective    /66 (BP Location: Right arm, Patient Position: Chair, Cuff Size: Adult Small)   Pulse 86   Temp 98  F (36.7  C) (Tympanic)   Ht 1.6 m (5' 3\")   Wt 57.2 kg (126 lb)   SpO2 99%   BMI 22.32 kg/m    Body mass index is 22.32 kg/m .  Physical Exam   GENERAL: healthy, alert and no distress  EYES: Eyes grossly normal to inspection, PERRL and conjunctivae and sclerae normal  HENT: ear canals and TM's normal, nose and mouth without ulcers or lesions  NECK: no adenopathy, no asymmetry, masses, or scars and thyroid normal to palpation  RESP: lungs clear to auscultation - no rales, rhonchi or wheezes  CV: regular rate and rhythm, normal S1 S2, no S3 or S4, no murmur, click or rub, no peripheral edema and peripheral pulses strong  ABDOMEN: soft, nontender  MS: no gross musculoskeletal defects noted, no edema  SKIN: no suspicious lesions or rashes  NEURO: Normal tone, and speech normal, slight tremor of upper extremities noted, generalized weakness but no unilateral weakness, CN II-XII grossly intact  BACK: no CVA tenderness  PSYCH: appears to have some short-term memory loss/recall    Diagnostic Test Results:  Labs reviewed in Epic  Results for orders placed or performed in visit on 07/31/19 (from the past 24 hour(s))   UA with Microscopic reflex to Culture   Result Value Ref Range    Color Urine Yellow     Appearance Urine Clear     Glucose Urine Negative NEG^Negative mg/dL    Bilirubin Urine Negative NEG^Negative    Ketones Urine 15 (A) NEG^Negative mg/dL    Specific Gravity Urine 1.020 1.003 - 1.035    pH Urine 7.0 5.0 - 7.0 pH    Protein Albumin Urine Negative NEG^Negative mg/dL    Urobilinogen Urine 4.0 (H) 0.2 - 1.0 EU/dL    Nitrite Urine Negative NEG^Negative    Blood Urine Negative NEG^Negative    Leukocyte Esterase Urine Trace (A) NEG^Negative    Source Midstream Urine     WBC Urine 0 - 5 OTO5^0 - 5 /HPF    RBC Urine O - 2 OTO2^O - 2 /HPF    " Squamous Epithelial /LPF Urine Few FEW^Few /LPF   CBC with platelets   Result Value Ref Range    WBC 7.5 4.0 - 11.0 10e9/L    RBC Count 4.28 3.8 - 5.2 10e12/L    Hemoglobin 12.9 11.7 - 15.7 g/dL    Hematocrit 41.0 35.0 - 47.0 %    MCV 96 78 - 100 fl    MCH 30.1 26.5 - 33.0 pg    MCHC 31.5 31.5 - 36.5 g/dL    RDW 13.5 10.0 - 15.0 %    Platelet Count 340 150 - 450 10e9/L           Assessment & Plan     ICD-10-CM    1. Screening for condition Z13.9 UA with Microscopic reflex to Culture     TSH with free T4 reflex     Comprehensive metabolic panel     CBC with platelets     EKG 12-lead complete w/read - Clinics     Urine Culture Aerobic Bacterial   2. Anxiety F41.9 UA with Microscopic reflex to Culture     TSH with free T4 reflex     Comprehensive metabolic panel     CBC with platelets     EKG 12-lead complete w/read - Clinics     Urine Culture Aerobic Bacterial   3. Dizziness R42 UA with Microscopic reflex to Culture     TSH with free T4 reflex     Comprehensive metabolic panel     CBC with platelets     EKG 12-lead complete w/read - Clinics     Urine Culture Aerobic Bacterial     Magnesium   4. Mild cognitive impairment G31.84 UA with Microscopic reflex to Culture     TSH with free T4 reflex     Comprehensive metabolic panel     CBC with platelets     EKG 12-lead complete w/read - Clinics     Urine Culture Aerobic Bacterial   5. Confusion R41.0 UA with Microscopic reflex to Culture     TSH with free T4 reflex     Comprehensive metabolic panel     CBC with platelets     EKG 12-lead complete w/read - Clinics     Urine Culture Aerobic Bacterial   6. Tremor R25.1 UA with Microscopic reflex to Culture     TSH with free T4 reflex     Comprehensive metabolic panel     CBC with platelets     EKG 12-lead complete w/read - Clinics     Urine Culture Aerobic Bacterial     Magnesium     Patient with hx of Parkinson's, anxiety, depression, and mild cognitive impairment with worsening symptoms over the past several months. Here  today based on neurologist recommendation to rule out infection or thyroid disease as cause of the above symptoms. Daughter notes symptom onset/worsening with increase of Namenda and concerned it could be from the medication. Labs ordered to rule out UTI, infection, anemia, electrolyte disturbance, kidney/liver/thryoid disease. EKG normal.   Plan pending final results but needs f/u with neurology, may be sooner if all labs are normal. Patient appears stable in clinic in no acute distress and was stable upon leaving the clinic.       See Patient Instructions    No follow-ups on file.    Dorys Kingsley NP  [facility]

## 2019-07-31 NOTE — TELEPHONE ENCOUNTER
From Dr. Styles's office visit note 6/11/19:    Has left arm pulling up and has more tremor  Has more shaking at 3pm and has more anxiety     PLAN  Move the 3pm dose sooner     Use the magnesium only one for constipation     Return back   ----------------------------------------------  Called patient's daughter back. Ibis reports these new symptoms started around the time of when she started on full strength Namenda 10 Mg. The symptoms she is referring to are increased confusion and anxiety, shakiness and stomach unease. Patient's confusion involves having trouble with her light switches. She cannot seem to operate them well and is confused by them.    Her anxiety has increased over the last several months. Ibis thinks that the feeling in her stomach is more of an anxious feeling. Ibis started giving her an extra seroquel at 3 pm for about a week now     Ibis reports that her mother's decline began 3 years ago in March with a urinary tract infection. It was not resolved with first course of antibiotics, second course got terrible diarrhea, stool testing, dehydration. By June of that year, she showed the first signs of pronounced confusion. She got back to baseline in the summer 2017. New Year's Cate 2017 had another bladder infection. Since then she has not had any urinary tract infections. Ibis thinks overall, the patient is not impaired but the anxiety has really gotten worse. Ibis has noticed it more this summer. Her mother has an urgency in her voice.    Plan:    1. See primary care provider to be checked for any infectious process. Also have her thyroid checked.  2. Follow up with Dr. Styles in September as planned if there is an infection for the primary care provider to treat.  3. Come see us sooner if no infection is found.  ------------------------------------------  Called patient to discuss.    She reports her symptoms began soon after her last visit with Dr. Styles 6/11. Throughout our  conversation she was having a VERY difficult time verbalizing her thoughts and forgeting.  She agrees with the symptoms Ibis discussed. Constipation is really bad. She can't remember things. She states she hasn't been drinking water like she should.  She does not know if she has anxiety or if this is a blood pressure issue.    She cannot walk a straight line - when she faces her head to the floor she gets dizzy, getting up out of the chair, lightheaded. She fell last week - she was in the garage - around the same spot (one step on the floor) she cannot differentiate the step up, she feels like she is going to walk into something.     Between 8 pm - 10 pm she feels the best. Her sister comes in and sets up the pills, her watch alarm goes off. Sometimes she does not take them right away and then forgets.    Plan:  1. Drink 8 glasses of water per day. Drink one glass each time she is in the bathroom to help her remember.  2. Take blood pressure while laying down, sitting and after standing for 2 minutes. Also take it after lunch while sitting, then stand for 2 minutes and take again.  3. Call for an new primary care provider here at the [facility]   4. Have Zainab see if Rytary is covered.  5. Breathing exercises for when she is nervous. Paige sending.  6. Have Ibis check the area of the garage that seems to be problematic for her (the step?).  7. Paige send this plan in writing to patient.    40 minute phone call.

## 2019-08-01 LAB
ALBUMIN SERPL-MCNC: 3.9 G/DL (ref 3.4–5)
ALP SERPL-CCNC: 92 U/L (ref 40–150)
ALT SERPL W P-5'-P-CCNC: 14 U/L (ref 0–50)
ANION GAP SERPL CALCULATED.3IONS-SCNC: 8 MMOL/L (ref 3–14)
AST SERPL W P-5'-P-CCNC: 14 U/L (ref 0–45)
BILIRUB SERPL-MCNC: 0.8 MG/DL (ref 0.2–1.3)
BUN SERPL-MCNC: 16 MG/DL (ref 7–30)
CALCIUM SERPL-MCNC: 9 MG/DL (ref 8.5–10.1)
CHLORIDE SERPL-SCNC: 106 MMOL/L (ref 94–109)
CO2 SERPL-SCNC: 27 MMOL/L (ref 20–32)
CREAT SERPL-MCNC: 1.02 MG/DL (ref 0.52–1.04)
GFR SERPL CREATININE-BSD FRML MDRD: 53 ML/MIN/{1.73_M2}
GLUCOSE SERPL-MCNC: 96 MG/DL (ref 70–99)
POTASSIUM SERPL-SCNC: 4.2 MMOL/L (ref 3.4–5.3)
PROT SERPL-MCNC: 7.2 G/DL (ref 6.8–8.8)
SODIUM SERPL-SCNC: 141 MMOL/L (ref 133–144)
TSH SERPL DL<=0.005 MIU/L-ACNC: 2.08 MU/L (ref 0.4–4)

## 2019-08-01 PROCEDURE — 36415 COLL VENOUS BLD VENIPUNCTURE: CPT | Performed by: NURSE PRACTITIONER

## 2019-08-01 PROCEDURE — 87086 URINE CULTURE/COLONY COUNT: CPT | Performed by: NURSE PRACTITIONER

## 2019-08-01 PROCEDURE — 83735 ASSAY OF MAGNESIUM: CPT | Performed by: NURSE PRACTITIONER

## 2019-08-01 NOTE — PATIENT INSTRUCTIONS
We will do blood work today and let you know your lab results.  Keep your appointment with Dr. Andersen

## 2019-08-01 NOTE — TELEPHONE ENCOUNTER
I informed Ibis that Dr. Styles would like Paige to come in and see our fellow Dr. Ku sooner than her September appointment.    Ibis stated Paige did not have infection.  They are still waiting on her thyroid result.  Ibis stated she really thinks the anxiety is causing the shaking.    She stated she does not think there is a blood pressure issue or dehydration going on, Paige sometimes will misreport symptoms.    Paige did try taking Seroquel at 3 PM for 2 weeks to help with the anxious feeling she had in the evenings. Stopped due no not feeling better and sleepiness.    -Last saw psychiatrist in April  -See's them every 6 months  -Psychiatrist prescribes Celexa    Questions for Zainab Sanches:  -Is there any change the name brand of Exelon to rivastigmine would cause any of the shaking or increased anxiety Paige is experiencing.  -Would the Namenda cause the shakiness or anxiety?    Plan/Recommendation:  1. Ibis will call Paige's psychiatrist regarding Paige's anxiety and make an appointment if needed.    2. Ibis would like to hold off on scheduling with a fellow until she speaks with the psychiatrist office. I will send her a [employer] message regarding how to schedule with Dr. Ku.    3. Per discussion with Zainab Sanches rivastigmine or Namenda would not cause the shaking or anxiety that Ibis describes. Zainab Sanches also recommends she see the fellow regarding this if she does not see psychiatry - I will notify Ibis via [employer].

## 2019-08-02 LAB
BACTERIA SPEC CULT: NORMAL
MAGNESIUM SERPL-MCNC: 2.3 MG/DL (ref 1.6–2.3)
SPECIMEN SOURCE: NORMAL

## 2019-09-06 NOTE — PROGRESS NOTES
Diagnosis/Summary/Recommendations:    PATIENT: Paige Mercado  74 year old female     : 1945    VLADIMIR: 2019         Medications     7am 11am 3pm 8pm 10pm      Acetaminophen 500mg RARE                  Aspirin 81mg           1      Carbidopa/levodopa 25/100 Sinemet  2 2 2 2         Carbidopa/levodopa Rytary 145mg Not started.         Cholecalciferol vitamin d 2000                  Citalopram celexa 40mg              1      Fluticasone flonase 50mcg/act nasal spray Not using              Levothyroxine synthroid 50 mcg 1                  MEMANTINE NAMENDA 10MG 1       1     Mirtazapine remeron 15mg     1    Nonformulary oxygenate magnesium 350mg oxypowder         Nonformulary runs smoothly tea SMOOTH MOVE               Polyethylene glycol miralax            As needed      Quetiapine 100mg seroquel             1 As needed   Quetiapine 25mg seroquel 1          2   and as needed   Rivastigmine exelon 13.3mg/24 hr 24 hr patch 1             Ropinirole requip 1mg 1 1 1            Senna-docusate senokot-S/pericolace 8.6-50 mg         Not taking     Simvastatin zocor 20mg             1      Sumatriptan imitrex 25mg As needed                                                               History obtained from patient    PLAN  Switch to rytary second   Continue the mirtazapine -  Later may increase this and have a reduction in seroquel per her psychiatrist  Wean down on the namenda first     Discussed use of tums over pump inhibitors.          Medications     7am 11am 3pm 8pm 10pm      Acetaminophen 500mg RARE                  Aspirin 81mg           1      Calcium carbonate tums 500mg Will use as needed        Carbidopa/levodopa 25/100 Sinemet  stop            Carbidopa/levodopa Rytary 145mg  3 3 3      Cholecalciferol vitamin d 2000                  Citalopram celexa 40mg              1      Fluticasone flonase 50mcg/act nasal spray Not using              Levothyroxine synthroid 50 mcg 1                   MEMANTINE NAMENDA 10MG 1       wean     Mirtazapine remeron 15mg     1    Nonformulary oxygenate magnesium 350mg oxypowder         Nonformulary runs smoothly tea SMOOTH MOVE               Polyethylene glycol miralax            As needed      Quetiapine 100mg seroquel             1 As needed   Quetiapine 25mg seroquel 1          2   and as needed   Rivastigmine exelon 13.3mg/24 hr 24 hr patch 1             Ropinirole requip 1mg 1 1 1            Senna-docusate senokot-S/pericolace 8.6-50 mg         Not taking     Simvastatin zocor 20mg             1      Sumatriptan imitrex 25mg As needed                                                                     Coding statement:   Duration of  Services: patient care and care coordination was 25 minutes  Greater than 50% of this visit was spent in counseling and coordination of care.     Keith Styles MD     ______________________________________    Last visit date and details:     VLADIMIR: June 11, 2019     Has more jaw tremor. Not sure      Runny nose - not sure an anticholinergic medication is the best      Has left arm pulling up and has more tremor  Has more shaking at 3pm and has more anxiety      Has had problems with coming up with the word      Deborah is declining     Tired at 3pm and the best part of the day is when she wakes up in the morning and at 10pm when she is going to bed after taking her last dose.  She naps at 3-4pm  Gets back from exercise (3/week) at 130/2pm and is tired. She has food and then has wearing.   Parkinson class [facility]     Wondering about surgery     Constipation       oxypowder magnesium      Reduce pills     Jerking - myoclonic. May happen when standing     Head tremor.         Medications     7am 11am 3pm 8pm 10pm      Acetaminophen 500mg RARE                  Aspirin 81mg           1      Carbidopa/levodopa 25/100 Sinemet  2 2 2 2         Cholecalciferol vitamin d 2000  1                  Citalopram celexa 40mg              1       Fluticasone flonase 50mcg/act nasal spray Not using              Levothyroxine synthroid 50 mcg 1                  MEMANTINE NAMENDA 10MG 1       1     Nonformulary runs smoothly tea SMOOTH MOVE               Polyethylene glycol miralax            As needed      Quetiapine 100mg seroquel             1 As needed   Quetiapine 25mg seroquel 1          1-2   and as needed   Rivastigmine exelon 13.3mg/24 hr 24 hr patch 1             Ropinirole requip 1mg 1 1 1            Senna-docusate senokot-S/pericolace 8.6-50 mg         Not taking     Simvastatin zocor 20mg             1      Sumatriptan imitrex 25mg As needed                  Vitamin D3 cholecalciferol 1000 units 1                                            History obtained from patient     Questions - does she need aspirin or vitamin D     145mg rytary 3 capsules by mouth 3/day - option  Versus using stalevo 200 or CR     PLAN  Move the 3pm dose sooner     Use the magnesium only one for constipation          ______________________________________      Patient was asked about 14 Review of systems including changes in vision (dry eyes, double vision), hearing, heart, lungs, musculoskeletal, depression, anxiety, snoring, RBD, insomnia, urinary frequency, urinary urgency, constipation, swallowing problems, hematological, ID, allergies, skin problems: seborrhea, endocrinological: thyroid, diabetes, cholesterol; balance, weight changes, and other neurological problems and these were not significant at this time except for   Patient Active Problem List   Diagnosis     Allergic rhinitis     Anxiety     Conversion disorder     Depression     Essential hypertension     Generalized anxiety disorder     H/O bone density study     HTN (hypertension), benign     Hyperlipidemia     Hyperplastic polyps of stomach     Hypothyroidism     Lumbago     Migraine     Osteoarthrosis     Parkinson disease (H)     Parkinson's disease (H)     Parotid mass     PD (Parkinson's disease)  "(H)     Pyelonephritis     Prediabetes     S/P colonoscopy     Warthin's tumor     Mild cognitive impairment     Personal history of malignant neoplasm of breast     Hypothyroidism, unspecified type          Allergies   Allergen Reactions     Ciprofloxacin GI Disturbance     Ciprofloxacin      Other reaction(s): Intolerance-Can't Take  PN: LW Reaction: NERVOUSNESS    Other reaction(s): Intolerance-Can't Take     Sulfa Drugs GI Disturbance     Other reaction(s): Intolerance-Can't Take  PN: LW Reaction: GI Upset    Other reaction(s): Intolerance-Can't Take     Xanax [Alprazolam] Other (See Comments)     \"zombie-like\" and confused     Past Surgical History:   Procedure Laterality Date     ARTHROSCOPY SHOULDER       EYE SURGERY Right     scheduled right cataract 1st may 2018     EYE SURGERY Left     scheduled left cataract may 2018     GYN SURGERY      hysterectomy - Partial?     HEMORRHOIDECTOMY       MASTECTOMY Right     breast cancer  2000 or so     PAROTIDECTOMY Left     [place] tumor 2012     TONSILLECTOMY      3 yrs of age     Past Medical History:   Diagnosis Date     Cancer (H)     breast     Social History     Socioeconomic History     Marital status:      Spouse name: Not on file     Number of children: Not on file     Years of education: Not on file     Highest education level: Not on file   Occupational History     Not on file   Social Needs     Financial resource strain: Not on file     Food insecurity:     Worry: Not on file     Inability: Not on file     Transportation needs:     Medical: Not on file     Non-medical: Not on file   Tobacco Use     Smoking status: Never Smoker     Smokeless tobacco: Never Used   Substance and Sexual Activity     Alcohol use: Yes     Drug use: No     Sexual activity: Never   Lifestyle     Physical activity:     Days per week: Not on file     Minutes per session: Not on file     Stress: Not on file   Relationships     Social connections:     Talks on phone: Not on file "     Gets together: Not on file     Attends Baptism service: Not on file     Active member of club or organization: Not on file     Attends meetings of clubs or organizations: Not on file     Relationship status: Not on file     Intimate partner violence:     Fear of current or ex partner: Not on file     Emotionally abused: Not on file     Physically abused: Not on file     Forced sexual activity: Not on file   Other Topics Concern     Parent/sibling w/ CABG, MI or angioplasty before 65F 55M? No   Social History Narrative    lives in [facility]    Ibis Bethea is daughter       Drug and lactation database from the [place] National Library of Medicine:  http://toxnet.nlm.nih.gov/cgi-bin/sis/htmlgen?LACT      B/P: Data Unavailable, T: Data Unavailable, P: Data Unavailable, R: Data Unavailable 0 lbs 0 oz  not currently breastfeeding., There is no height or weight on file to calculate BMI.  Medications and Vitals not listed above were documented in the cart and reviewed by me.     Current Outpatient Medications   Medication Sig Dispense Refill     acetaminophen (TYLENOL) 500 MG tablet Take 1 tablet (500 mg) by mouth every 4 hours as needed for mild pain       aspirin (ASPIRIN LOW DOSE) 81 MG tablet 81mg tablet @ NIGHT 30 tablet      carbidopa-levodopa (SINEMET)  MG tablet 2.5 tabs @ 7am, 11am, 3pm and 8pm and 1 tab as needed = 11 tabs/day 990 tablet 3     Cholecalciferol (VITAMIN D) 2000 UNITS CAPS 2000 units @ 7am 30 capsule      citalopram (CELEXA) 40 MG tablet 40 mg tablet by mouth @ 10pm 90 tablet 3     levothyroxine (SYNTHROID) 50 MCG tablet 50mcg tablet by mouth @ 7am 90 tablet 3     memantine (NAMENDA) 10 MG tablet 10mg TABS BY MOUTH TWICE DAILY @ 7AM AND 10PM 180 tablet 3     NONFORMULARY Oxygenated magnesium - oxy powder 1 capsule = 350mg magnesium oxide as needed       order for DME Equipment being ordered: Vie light    Intranasal light therapy is to use the therapeutic effects of light on  the body and brain via the nasal cavity. This is made possible through the use of a device that emits 810 nanometers of red infrared light. A user has to insert and clip on the light-emitting part of the device in one of his or her nostrils and leave it on for a specific amount of time. (Patient not taking: Reported on 6/11/2019) 1 Device 11     polyethylene glycol (MIRALAX/GLYCOLAX) packet 1/2 CAPFUL BY MOUTH NIGHTLY@9-10PM 7 packet      QUEtiapine (SEROQUEL) 100 MG tablet 100mg tablet @ 10pm; takes with 2 x 25 mg = 150 mg at night 60 tablet 11     QUEtiapine (SEROQUEL) 25 MG tablet 1 tab @ 7am and 2 tabs @ 10pm (along with 100mg at bedtime) and 25mg tab as needed 360 tablet 3     rivastigmine (EXELON) 13.3 MG/24HR 24 hr patch Place 1 patch onto the skin daily 90 patch 3     rOPINIRole (REQUIP) 1 MG tablet 1mg tablet by mouth @ 7am, 11am and 3pm 270 tablet 3     simvastatin (ZOCOR) 20 MG tablet 20mg tablet by mouth @ 10pm 90 tablet 3     SUMAtriptan (IMITREX) 25 MG tablet Take 1 tablet (25 mg) by mouth at onset of headache for migraine 30 tablet          Keith Styles MD

## 2019-09-17 ENCOUNTER — ALLIED HEALTH/NURSE VISIT (OUTPATIENT)
Dept: NEUROLOGY | Facility: CLINIC | Age: 74
End: 2019-09-17

## 2019-09-17 ENCOUNTER — OFFICE VISIT (OUTPATIENT)
Dept: PHARMACY | Facility: CLINIC | Age: 74
End: 2019-09-17
Payer: COMMERCIAL

## 2019-09-17 ENCOUNTER — OFFICE VISIT (OUTPATIENT)
Dept: NEUROLOGY | Facility: CLINIC | Age: 74
End: 2019-09-17
Payer: MEDICARE

## 2019-09-17 VITALS
HEART RATE: 99 BPM | DIASTOLIC BLOOD PRESSURE: 76 MMHG | OXYGEN SATURATION: 95 % | SYSTOLIC BLOOD PRESSURE: 132 MMHG | RESPIRATION RATE: 16 BRPM

## 2019-09-17 DIAGNOSIS — G20.A1 PARKINSON DISEASE (H): Primary | ICD-10-CM

## 2019-09-17 DIAGNOSIS — K59.00 CONSTIPATION, UNSPECIFIED CONSTIPATION TYPE: ICD-10-CM

## 2019-09-17 DIAGNOSIS — Z53.9 ERRONEOUS ENCOUNTER--DISREGARD: ICD-10-CM

## 2019-09-17 DIAGNOSIS — F41.9 ANXIETY: ICD-10-CM

## 2019-09-17 DIAGNOSIS — R41.3 MEMORY PROBLEM: ICD-10-CM

## 2019-09-17 DIAGNOSIS — G20.A1 PARKINSON'S DISEASE (H): Primary | ICD-10-CM

## 2019-09-17 DIAGNOSIS — K21.9 GASTROESOPHAGEAL REFLUX DISEASE, ESOPHAGITIS PRESENCE NOT SPECIFIED: ICD-10-CM

## 2019-09-17 PROCEDURE — 99605 MTMS BY PHARM NP 15 MIN: CPT | Performed by: PHARMACIST

## 2019-09-17 PROCEDURE — 99607 MTMS BY PHARM ADDL 15 MIN: CPT | Performed by: PHARMACIST

## 2019-09-17 RX ORDER — MIRTAZAPINE 15 MG/1
TABLET, FILM COATED ORAL
Refills: 0 | COMMUNITY
Start: 2019-08-28 | End: 2020-01-01

## 2019-09-17 RX ORDER — MEMANTINE HYDROCHLORIDE 5 MG/1
5 TABLET ORAL 2 TIMES DAILY
Qty: 60 TABLET | Refills: 11 | Status: SHIPPED | OUTPATIENT
Start: 2019-09-17 | End: 2020-01-01

## 2019-09-17 RX ORDER — CALCIUM CARBONATE 500 MG/1
1 TABLET, CHEWABLE ORAL DAILY PRN
COMMUNITY
End: 2020-01-01

## 2019-09-17 ASSESSMENT — PAIN SCALES - GENERAL: PAINLEVEL: NO PAIN (0)

## 2019-09-17 ASSESSMENT — PATIENT HEALTH QUESTIONNAIRE - PHQ9: SUM OF ALL RESPONSES TO PHQ QUESTIONS 1-9: 17

## 2019-09-17 NOTE — PATIENT INSTRUCTIONS
PLAN  Switch to rytary second   Continue the mirtazapine -  Later may increase this and have a reduction in seroquel per her psychiatrist  Wean down on the namenda first     Discussed use of tums over pump inhibitors.          Medications     7am 11am 3pm 8pm 10pm      Acetaminophen 500mg RARE                  Aspirin 81mg           1      Calcium carbonate tums 500mg Will use as needed        Carbidopa/levodopa 25/100 Sinemet  stop            Carbidopa/levodopa Rytary 145mg  3 3 3      Cholecalciferol vitamin d 2000  1                  Citalopram celexa 40mg              1      Fluticasone flonase 50mcg/act nasal spray Not using              Levothyroxine synthroid 50 mcg 1                  MEMANTINE NAMENDA 10MG 1       wean     Mirtazapine remeron 15mg     1    Nonformulary oxygenate magnesium 350mg oxypowder         Nonformulary runs smoothly tea SMOOTH MOVE               Polyethylene glycol miralax            As needed      Quetiapine 100mg seroquel             1 As needed   Quetiapine 25mg seroquel 1          2   and as needed   Rivastigmine exelon 13.3mg/24 hr 24 hr patch 1             Ropinirole requip 1mg 1 1 1            Senna-docusate senokot-S/pericolace 8.6-50 mg         Not taking     Simvastatin zocor 20mg             1      Sumatriptan imitrex 25mg As needed

## 2019-09-17 NOTE — NURSING NOTE
[facility]:  PHQ-9 Screening Note  SITUATION/BACKGROUND                                                    Paige Mercado is a 74 year old female who completed the PHQ-9 assessment for depression and Score is >9.    Onset of symptoms: gradual  Trigger: Stress  Recent related events: increasing anxiety  Prior history of suicide attempt or self harm: NA  Risk Factors: single status and anxiety  History of depression or mental illness: Yes  Medications reviewed: Yes     ASSESSMENT      A. Are any of the following present?      Suicidal thoughts with a plan and means to carry out the plan?    Intent to harm others    Altered mental status: confusion, delusional, psychotic NO - got to B   B. Are any of the following present?      Suicidal thoughts without a plan or means to carry out the plan    New onset of delusional ideas    Past inpatient admission for depression    New onset and recent change or addition of new medication NO - go to C   C. Are any of the following present?      Previous suicide attempts    Depression interfering with ability to work or function    Loss of appetite and eating poorly    Abrupt cessation of drugs (OTC or RX), alcohol or caffeine    Drug or alcohol abuse NO - go to D   D. Are several of the following present?      Difficulty concentrating    Difficulty sleeping    Reduced interest in sexual activity or impotency    Irregular or absent menstruation    No interest in activity    Change in interpersonal relationships    Increased use/abuse of alcohol or drugs    Pregnant or recent child birth    Recent major life change    History of depression Increased anxiety         PLAN      Home Care Instructions:   Contact friends or family for support  Increase exercise and enjoyable activities  East a well-balanced diet, drink plenty of fluids and rest as needed    Seek emergency care immediately if any of the following occur:   NA    BEHAVIORAL HEALTH TEAMS      [facility] -  Behavioral Health Team    [facility] Pager: [phone]    [place]  - Behavioral Health Team    Pager number: [phone]    Referral to Behavioral Health    [place] [facility] [id number]    RESOURCES      - Crisis Hotlines by [facility]:  [facility] [place] [phone]    Paige Jensen RN

## 2019-09-17 NOTE — PROGRESS NOTES
"[facility]:  PHQ-9 Screening Note    SITUATION/BACKGROUND                                                    Paige Mercado is a 74 year old female who completed the PHQ-9 assessment for depression and Score is >9.    Onset of symptoms: gradual  Trigger: stress  Recent related events: increased anxiety  Prior history of suicide attempt or self harm: NA  Risk Factors:   History of depression or mental illness: Yes  Medications reviewed: Yes     ASSESSMENT      A. Are any of the following present?      Suicidal thoughts with a plan and means to carry out the plan?    Intent to harm others    Altered mental status: confusion, delusional, psychotic YES  - Patient should be seen in the ED.  If patient is willing to go to the ED, call [facility] at [phone].  If patient is unwilling to go to the ED, call 911.   Clinic staff to fill out the  Transportation Hold  form.    Place order for referral to behavioral health team for  regular  follow-up.    NO - go to B   B. Are any of the following present?      Suicidal thoughts without a plan or means to carry out the plan    New onset of delusional ideas    Past inpatient admission for depression    New onset and recent change or addition of new medication YES  - Patient should receive crises care within 2-4 hours. Offer emergency room care or connect with any of the *crisis resources.     Place referral to behavioral health team for \"regular\" follow-up.    NO - go to C   C. Are any of the following present?      Previous suicide attempts    Depression interfering with ability to work or function    Loss of appetite and eating poorly    Abrupt cessation of drugs (OTC or RX), alcohol or caffeine    Drug or alcohol abuse YES -  [facility] behavior health team. If no response, patient should receive crisis care within 24 hours.     Place referral to behavioral health team for \"regular\" follow-up.     NO - go to D   D. Are several of " "the following present?      Difficulty concentrating    Difficulty sleeping    Reduced interest in sexual activity or impotency    Irregular or absent menstruation    No interest in activity    Change in interpersonal relationships    Increased use/abuse of alcohol or drugs    Pregnant or recent child birth    Recent major life change    History of depression YES -  Follow-up with PCP for appointment and follow home care instructions.    Place referral to behavioral health team for \"regular\" follow-up.    NO - provide home care instructions.        PLAN      Home Care Instructions:   Contact friends or family for support  Increase exercise and enjoyable activities  East a well-balanced diet, drink plenty of fluids and rest as needed    BEHAVIORAL HEALTH TEAMS      [facility] [place] - Behavioral Health Team    [facility] Pager: [phone]    [place]  - Behavioral Health Team    Pager number: [phone]      RESOURCES      - 24/7 Crisis Hotlines: National Suicide Prevention Hotline  768-271-HCGS (2096)  - Crisis Hotlines by County:  [facility] Crisis Line: [phone]    Paige Jensen RN        Copyright 2016 [employer]      "

## 2019-09-17 NOTE — LETTER
2019       RE: Paige Mercado   [address] [place] [address]     Dear Colleague,    Thank you for referring your patient, Paige Mercado, to the [facility] at [facility]. Please see a copy of my visit note below.    Diagnosis/Summary/Recommendations:    PATIENT: Paige Mercado  74 year old female     : 1945    VLADIMIR: 2019         Medications     7am 11am 3pm 8pm 10pm      Acetaminophen 500mg RARE                  Aspirin 81mg           1      Carbidopa/levodopa 25/100 Sinemet  2 2 2 2         Carbidopa/levodopa Rytary 145mg Not started.         Cholecalciferol vitamin d   1                  Citalopram celexa 40mg              1      Fluticasone flonase 50mcg/act nasal spray Not using              Levothyroxine synthroid 50 mcg 1                  MEMANTINE NAMENDA 10MG 1       1     Mirtazapine remeron 15mg     1    Nonformulary oxygenate magnesium 350mg oxypowder         Nonformulary runs smoothly tea SMOOTH MOVE               Polyethylene glycol miralax            As needed      Quetiapine 100mg seroquel             1 As needed   Quetiapine 25mg seroquel 1          2   and as needed   Rivastigmine exelon 13.3mg/24 hr 24 hr patch 1             Ropinirole requip 1mg 1 1 1            Senna-docusate senokot-S/pericolace 8.6-50 mg         Not taking     Simvastatin zocor 20mg             1      Sumatriptan imitrex 25mg As needed                                                               History obtained from patient    PLAN  Switch to rytary second   Continue the mirtazapine -  Later may increase this and have a reduction in seroquel per her psychiatrist  Wean down on the namenda first     Discussed use of tums over pump inhibitors.          Medications     7am 11am 3pm 8pm 10pm      Acetaminophen 500mg RARE                  Aspirin 81mg           1      Calcium carbonate tums 500mg Will use as needed        Carbidopa/levodopa  25/100 Sinemet  stop            Carbidopa/levodopa Rytary 145mg  3 3 3      Cholecalciferol vitamin d 2000  1                  Citalopram celexa 40mg              1      Fluticasone flonase 50mcg/act nasal spray Not using              Levothyroxine synthroid 50 mcg 1                  MEMANTINE NAMENDA 10MG 1       wean     Mirtazapine remeron 15mg     1    Nonformulary oxygenate magnesium 350mg oxypowder         Nonformulary runs smoothly tea SMOOTH MOVE               Polyethylene glycol miralax            As needed      Quetiapine 100mg seroquel             1 As needed   Quetiapine 25mg seroquel 1          2   and as needed   Rivastigmine exelon 13.3mg/24 hr 24 hr patch 1             Ropinirole requip 1mg 1 1 1            Senna-docusate senokot-S/pericolace 8.6-50 mg         Not taking     Simvastatin zocor 20mg             1      Sumatriptan imitrex 25mg As needed                                                                     Coding statement:   Duration of  Services: patient care and care coordination was 25 minutes  Greater than 50% of this visit was spent in counseling and coordination of care.     Keith Styles MD     ______________________________________    Last visit date and details:     VALDIMIR: June 11, 2019     Has more jaw tremor. Not sure      Runny nose - not sure an anticholinergic medication is the best      Has left arm pulling up and has more tremor  Has more shaking at 3pm and has more anxiety      Has had problems with coming up with the word      Deborah is declining     Tired at 3pm and the best part of the day is when she wakes up in the morning and at 10pm when she is going to bed after taking her last dose.  She naps at 3-4pm  Gets back from exercise (3/week) at 130/2pm and is tired. She has food and then has wearing.   Parkinson class [facility]     Wondering about surgery     Constipation       oxypowder magnesium      Reduce pills     Jerking - myoclonic. May happen when  standing     Head tremor.         Medications     7am 11am 3pm 8pm 10pm      Acetaminophen 500mg RARE                  Aspirin 81mg           1      Carbidopa/levodopa 25/100 Sinemet  2 2 2 2         Cholecalciferol vitamin d 2000  1                  Citalopram celexa 40mg              1      Fluticasone flonase 50mcg/act nasal spray Not using              Levothyroxine synthroid 50 mcg 1                  MEMANTINE NAMENDA 10MG 1       1     Nonformulary runs smoothly tea SMOOTH MOVE               Polyethylene glycol miralax            As needed      Quetiapine 100mg seroquel             1 As needed   Quetiapine 25mg seroquel 1          1-2   and as needed   Rivastigmine exelon 13.3mg/24 hr 24 hr patch 1             Ropinirole requip 1mg 1 1 1            Senna-docusate senokot-S/pericolace 8.6-50 mg         Not taking     Simvastatin zocor 20mg             1      Sumatriptan imitrex 25mg As needed                  Vitamin D3 cholecalciferol 1000 units 1                                            History obtained from patient     Questions - does she need aspirin or vitamin D     145mg rytary 3 capsules by mouth 3/day - option  Versus using stalevo 200 or CR     PLAN  Move the 3pm dose sooner     Use the magnesium only one for constipation          ______________________________________      Patient was asked about 14 Review of systems including changes in vision (dry eyes, double vision), hearing, heart, lungs, musculoskeletal, depression, anxiety, snoring, RBD, insomnia, urinary frequency, urinary urgency, constipation, swallowing problems, hematological, ID, allergies, skin problems: seborrhea, endocrinological: thyroid, diabetes, cholesterol; balance, weight changes, and other neurological problems and these were not significant at this time except for   Patient Active Problem List   Diagnosis     Allergic rhinitis     Anxiety     Conversion disorder     Depression     Essential hypertension     Generalized  "anxiety disorder     H/O bone density study     HTN (hypertension), benign     Hyperlipidemia     Hyperplastic polyps of stomach     Hypothyroidism     Lumbago     Migraine     Osteoarthrosis     Parkinson disease (H)     Parkinson's disease (H)     Parotid mass     PD (Parkinson's disease) (H)     Pyelonephritis     Prediabetes     S/P colonoscopy     Warthin's tumor     Mild cognitive impairment     Personal history of malignant neoplasm of breast     Hypothyroidism, unspecified type          Allergies   Allergen Reactions     Ciprofloxacin GI Disturbance     Ciprofloxacin      Other reaction(s): Intolerance-Can't Take  PN: LW Reaction: NERVOUSNESS    Other reaction(s): Intolerance-Can't Take     Sulfa Drugs GI Disturbance     Other reaction(s): Intolerance-Can't Take  PN: LW Reaction: GI Upset    Other reaction(s): Intolerance-Can't Take     Xanax [Alprazolam] Other (See Comments)     \"zombie-like\" and confused     Past Surgical History:   Procedure Laterality Date     ARTHROSCOPY SHOULDER       EYE SURGERY Right     scheduled right cataract 1st may 2018     EYE SURGERY Left     scheduled left cataract may 2018     GYN SURGERY      hysterectomy - Partial?     HEMORRHOIDECTOMY       MASTECTOMY Right     breast cancer  2000 or so     PAROTIDECTOMY Left     [place] tumor 2012     TONSILLECTOMY      3 yrs of age     Past Medical History:   Diagnosis Date     Cancer (H)     breast     Social History     Socioeconomic History     Marital status:      Spouse name: Not on file     Number of children: Not on file     Years of education: Not on file     Highest education level: Not on file   Occupational History     Not on file   Social Needs     Financial resource strain: Not on file     Food insecurity:     Worry: Not on file     Inability: Not on file     Transportation needs:     Medical: Not on file     Non-medical: Not on file   Tobacco Use     Smoking status: Never Smoker     Smokeless tobacco: Never Used "   Substance and Sexual Activity     Alcohol use: Yes     Drug use: No     Sexual activity: Never   Lifestyle     Physical activity:     Days per week: Not on file     Minutes per session: Not on file     Stress: Not on file   Relationships     Social connections:     Talks on phone: Not on file     Gets together: Not on file     Attends [employer] service: Not on file     Active member of club or organization: Not on file     Attends meetings of clubs or organizations: Not on file     Relationship status: Not on file     Intimate partner violence:     Fear of current or ex partner: Not on file     Emotionally abused: Not on file     Physically abused: Not on file     Forced sexual activity: Not on file   Other Topics Concern     Parent/sibling w/ CABG, MI or angioplasty before 65F 55M? No   Social History Narrative    lives in [facility]    Ibis Bethea is daughter       Drug and lactation database from the [place] [employer] of Medicine:  http://toxnet.nlm.nih.gov/cgi-bin/sis/htmlgen?LACT      B/P: Data Unavailable, T: Data Unavailable, P: Data Unavailable, R: Data Unavailable 0 lbs 0 oz  not currently breastfeeding., There is no height or weight on file to calculate BMI.  Medications and Vitals not listed above were documented in the cart and reviewed by me.     Current Outpatient Medications   Medication Sig Dispense Refill     acetaminophen (TYLENOL) 500 MG tablet Take 1 tablet (500 mg) by mouth every 4 hours as needed for mild pain       aspirin (ASPIRIN LOW DOSE) 81 MG tablet 81mg tablet @ NIGHT 30 tablet      carbidopa-levodopa (SINEMET)  MG tablet 2.5 tabs @ 7am, 11am, 3pm and 8pm and 1 tab as needed = 11 tabs/day 990 tablet 3     Cholecalciferol (VITAMIN D) 2000 UNITS CAPS 2000 units @ 7am 30 capsule      citalopram (CELEXA) 40 MG tablet 40 mg tablet by mouth @ 10pm 90 tablet 3     levothyroxine (SYNTHROID) 50 MCG tablet 50mcg tablet by mouth @ 7am 90 tablet 3     memantine (NAMENDA)  10 MG tablet 10mg TABS BY MOUTH TWICE DAILY @ 7AM AND 10PM 180 tablet 3     NONFORMULARY Oxygenated magnesium - oxy powder 1 capsule = 350mg magnesium oxide as needed       order for DME Equipment being ordered: Vie light    Intranasal light therapy is to use the therapeutic effects of light on the body and brain via the nasal cavity. This is made possible through the use of a device that emits 810 nanometers of red infrared light. A user has to insert and clip on the light-emitting part of the device in one of his or her nostrils and leave it on for a specific amount of time. (Patient not taking: Reported on 6/11/2019) 1 Device 11     polyethylene glycol (MIRALAX/GLYCOLAX) packet 1/2 CAPFUL BY MOUTH NIGHTLY@9-10PM 7 packet      QUEtiapine (SEROQUEL) 100 MG tablet 100mg tablet @ 10pm; takes with 2 x 25 mg = 150 mg at night 60 tablet 11     QUEtiapine (SEROQUEL) 25 MG tablet 1 tab @ 7am and 2 tabs @ 10pm (along with 100mg at bedtime) and 25mg tab as needed 360 tablet 3     rivastigmine (EXELON) 13.3 MG/24HR 24 hr patch Place 1 patch onto the skin daily 90 patch 3     rOPINIRole (REQUIP) 1 MG tablet 1mg tablet by mouth @ 7am, 11am and 3pm 270 tablet 3     simvastatin (ZOCOR) 20 MG tablet 20mg tablet by mouth @ 10pm 90 tablet 3     SUMAtriptan (IMITREX) 25 MG tablet Take 1 tablet (25 mg) by mouth at onset of headache for migraine 30 tablet          Keith Styles MD

## 2019-09-17 NOTE — PROGRESS NOTES
"SUBJECTIVE/OBJECTIVE:                Paige Mercado is a 74 year old female coming in for a follow-up visit for Medication Therapy Management.  She was referred to me from Dr. Styles and is seeing Dr. Styles today. Daughter, Rosalva, also present for the visit today.     Chief Complaint: Follow up from our visit on 12/13/18  Tobacco: No tobacco use  Alcohol: Less than 1 beverage / month    Medication Adherence/Access: no issues reported -daughter states that there is anxiety with each medication time and they would like to decrease number of medication administration times    Parkinson's Disease:  Current medications include: Ropinirole 1 mg 3 times daily at 7 am, 11 am, and 3 pm and Carbidopa-levodopa  mg 2.5 tablets 4 times/day at 7 am, 11 am, 3 pm, and 8 pm. They have noticed a slight improvement in PD symptoms (namely, tremor) since increasing the dose of carbidopa-levodopa over MyChart a few weeks ago but there is concern that her anxiety is still contributing to worse tremor. She has not been exercising for the past month due to this increased anxiety and tremor. She was previously wearing off 1-1.5 hours before each dose and the off time has since decreased, though they were not able to quantify by how much. Patient also endorses a feeling of \"internal shaking\" and feeling generally uncomfortable, especially between 6-7 pm in the evening. Patient states she feels best around 8 pm at night. Daughter has noticed that the patient seems most confused around 8 pm at night; for example, she has difficulty shutting lights off. Daughter asked about IV glutathione and other emerging therapies, which Dr. Styles addressed during the visit.     Dementia: Currently taking memantine 10 mg twice daily and Rivastigmine 13.3 mg patch daily. Patient has been concerned that memantine is making her feel worse and the daughter is wondering if we can taper off memantine. They have not seen any notable cognitive benefit from " "memantine.      Anxiety: Currently taking citalopram 40 mg daily, mirtazapine 15 mg nightly, and quetiapine 25 mg in the morning/150 mg in the evening with 25 mg available as needed for breakthrough anxiety. Patient is concerned that she is going to be \"locked up\" because she has been more confused and anxious and she feels the medications are making her worse overall. Patient still following with Dr. Mackay in psychiatry and goal is to decrease quetiapine and maybe increase mirtazapine over time, per daughter.     Constipation: Currently taking half-capful of Miralax every other day and states that she has a BM every 2-3 days. She is concerned about the frequency of BMs at this time but states that higher doses of Miralax and trying senna/docusate has resulted in loose stools. One dose of magnesium caused diarrhea previously.     GERD: Currently taking Tums as needed for heartburn. Patient has not tried ranitidine or other products and is wondering how many Tums she can take in a day.     Today's Vitals:   BP Readings from Last 1 Encounters:   09/17/19 132/76     Pulse Readings from Last 1 Encounters:   09/17/19 99       ASSESSMENT:              Current medications were reviewed today as discussed above.      Medication Adherence: excellent, no issues identified    Parkinson's Disease:  Needs improvement.  Patient seems to have experienced mild improvement in tremor with increase in carbidopa-levodopa but she is still experiencing some wearing off between doses.  In addition, patient may benefit from less frequent dosing of medication and therefore Rytary would be a good option to consider.  The patient is currently taking 1000 mg of levodopa per day which would equate to Rytary 195 mg 3 capsules 3 times per day.  Daughter will assist with changing the timing of the patient's medications and we will dose the Rytary earlier in the day to help with her wearing off in the evenings.    Dementia: Needs improvement.  " Patient may benefit from tapering off of memantine given the lack of benefit and possible side effects the patient may be experiencing.  For now, we will decrease the dose to 5 mg twice daily.    Anxiety: Needs improvement.  It is unclear how much benefit the quetiapine and mirtazapine are providing the patient for her anxiety.  Patient will continue to follow with psychiatry and make adjustments.  Patient was provided with various resources on stress reduction from Paige Jensen RN today.     Constipation: Needs improvement.  Patient may benefit from trying low-dose MiraLAX every day to help with more regular bowel movements.    GERD: Needs improvement.  Patient may benefit from continuing Tums and if ineffective or using more than recommended dose, could consider H2-blocker as a secondary option.      PLAN:                  1. Decrease memantine to half-tablet (5 mg) twice daily.   2. After insurance approves Rytary, stop carbidopa-levodopa and start Rytary 48. mg 3 capsules 3 times daily at 7 am, 11 am, and 3 pm. May switch timing to 7 am, 12 pm, and 5 pm if needed.   3. Adjustments to quetiapine and mirtazapine per Dr. Mackay, psychiatrist.   4. Try taking Miralax quarter-capful or half-capful daily.   5. Okay to take Tums (no more than max dose on bottle) as needed and if ineffective, consider ranitidine or famotidine.     I spent 30 minutes with this patient today. All changes were made via verbal approval with Dr. Styles. A copy of the visit note was provided to the patient's referring provider.     Will follow up in 3 months: 12/17/19.    The patient was given a summary of these recommendations as an after visit summary.    Zainab Sanches, Pharm.D.  Medication Therapy Management Pharmacist  Phone: [phone]

## 2019-09-17 NOTE — PATIENT INSTRUCTIONS
Recommendations from today's MTM visit:                                                      1. Please cut back Namenda to half-tablet (5 mg) twice daily. Eventually you can go off of this if you feel better on the lower dose.    2. Instead of carbidopa-levodopa (Sinemet), we will try Rytary. Rytary will be 48. mg 3 capsules 3 times daily at 7 am, 11 am, and 3 pm (alternatively, the timing can be 7 am, 12 pm, and 5 pm). You won't need to take any medications will be at 8 pm. Let us know how this is working for you with each dose of Rytary.     3. Continue to work with Dr. Mackay on the Seroquel and mirtazapine. Increasing mirtazapine may be an option in the future.     4. For constipation, you can try half-capful or even quarter-capful of Miralax every day.     It was great to speak with you today.  I value your experience and would be very thankful for your time with providing feedback on our clinic survey. You may receive a survey via email or text message in the next few days.     Next MTM visit: follow up by Joie with Zainab Sanches PharmD    To schedule another MTM appointment, please call the clinic directly or you may call the MTM scheduling line at [phone] or toll-free at [phone].     My Clinical Pharmacist's contact information:                                                      It was a pleasure talking with you today!  Please feel free to contact me with any questions or concerns you have.      Zainab Sanches, Pharm.D.  Medication Therapy Management Pharmacist  Phone: [phone]

## 2019-09-17 NOTE — NURSING NOTE
"[facility]:  PHQ-9 Screening Note     SITUATION/BACKGROUND                                                    Paige Mercado is a 74 year old female who completed the PHQ-9 assessment for depression and Score is >9.     Onset of symptoms: gradual  Trigger: stress  Recent related events: increased anxiety  Prior history of suicide attempt or self harm: NA  Risk Factors:   History of depression or mental illness: Yes  Medications reviewed: Yes     ASSESSMENT            A. Are any of the following present?      Suicidal thoughts with a plan and means to carry out the plan?    Intent to harm others    Altered mental status: confusion, delusional, psychotic YES  - Patient should be seen in the ED.  If patient is willing to go to the ED, call [facility] [employer] [facility] at [phone].  If patient is unwilling to go to the ED, call 911.   Clinic staff to fill out the  Transportation Hold  form.    Place order for referral to behavioral health team for  regular  follow-up.     NO - go to B   B. Are any of the following present?      Suicidal thoughts without a plan or means to carry out the plan    New onset of delusional ideas    Past inpatient admission for depression    New onset and recent change or addition of new medication YES  - Patient should receive crises care within 2-4 hours. Offer emergency room care or connect with any of the *crisis resources.      Place referral to behavioral health team for \"regular\" follow-up.     NO - go to C   C. Are any of the following present?      Previous suicide attempts    Depression interfering with ability to work or function    Loss of appetite and eating poorly    Abrupt cessation of drugs (OTC or RX), alcohol or caffeine    Drug or alcohol abuse YES -  [facility] behavior health team. If no response, patient should receive crisis care within 24 hours.      Place referral to behavioral health team for \"regular\" follow-up.      NO - go to D   D. Are " "several of the following present?      Difficulty concentrating    Difficulty sleeping    Reduced interest in sexual activity or impotency    Irregular or absent menstruation    No interest in activity    Change in interpersonal relationships    Increased use/abuse of alcohol or drugs    Pregnant or recent child birth    Recent major life change    History of depression YES -  Follow-up with PCP for appointment and follow home care instructions.     Place referral to behavioral health team for \"regular\" follow-up.     NO - provide home care instructions.          PLAN        Home Care Instructions:   Contact friends or family for support  Increase exercise and enjoyable activities  East a well-balanced diet, drink plenty of fluids and rest as needed     BEHAVIORAL HEALTH TEAMS        [facility] [place]    [facility] Pager: [phone]     [place]  - Behavioral Health Team    Pager number: [phone]        RESOURCES        - 24/7 Crisis Hotlines: National Suicide Prevention Hotline  556-792-DADI (4816)  - Crisis Hotlines by County:  [facility] Crisis Line: 671.296.5016     Paige Jensen RN    "

## 2019-09-17 NOTE — NURSING NOTE
"Anxiety is an issue.  No exercise for 5 weeks due to increased shaking and anxiety.    Saw psychiatrist at [facility] Dr. Mackay, Increased seroquel and started mirtazapine end of August.  Dr. Styles increased the carbidopa/levodopa 1/2 tab    Still on namenda, feels like since it started the anxiety has increased. Still on rivastigmine patch. She gets this for free and does not know why or how.    She feels best at 8 pm at night.  Denies compulsive behaviors.    Seroquel 25 mg at 7 am, bedtime 2 - 25mg tabs + 1 - 100 mg and 1 - 25 mg as needed. (only increase was the extra 25 mg at bedtime).    She tends to feel a lot of internal shaking. She does feel relief from her anxiety with the carbidopa/levodopa and gets a sensory on at times.    Her question, \"Can I drop some pills today?\"  She is afraid she is going insane and that she is dying slowly.    She is having a bowel movement every 2-3 days. She uses 1/2 cap miralax every other day.    She demonstrates a sense of humor.            "

## 2019-09-17 NOTE — NURSING NOTE
Chief Complaint   Patient presents with     RECHECK     [facility] RETURN MOVEMENT DISORDER F/U      Depression Response    Patient completed the PHQ-9 assessment for depression and scored >9? Yes  Question 9 on the PHQ-9 was positive for suicidality? No  Is the patient already receiving treatment for depression? Yes  Patient would like to speak with behavioral health team ([facility] clinics only)? No    I personally notified the following: clinic nurse    Behavioral Health/Social Work Contact Information     [facility]  Shelton Reyes MA, LMFT  Lead Behavioral Health Clinician  Phone: [phone]  [facility] Pager: [phone]    Non-[facility]  [facility] On-Call   Pager: [phone]          Wendi Kern CMA

## 2019-09-25 ENCOUNTER — TELEPHONE (OUTPATIENT)
Dept: PHARMACY | Facility: CLINIC | Age: 74
End: 2019-09-25

## 2019-09-25 NOTE — TELEPHONE ENCOUNTER
Received VM from patient stating she went to the pharmacy and was told she needs a coupon to get rivastigmine. She is requesting a call back. Will coordinate with daughter via [employer] on this issue as patient has cognitive impairment.     Zainab Sanches, Pharm.D.  Medication Therapy Management Pharmacist  Phone: [phone]

## 2019-09-25 NOTE — TELEPHONE ENCOUNTER
Spoke with [facility] pharmacy. They report there was an issue with processing the rivastigmine patch through the Assistance Fund. They did note that rivastigmine patches were processed previously on 7/2/19 with no issues and that Rytary was processed on 9/20/19. Both went through the Assistance Fund with no issues. Today they are getting a rejection for the rivastigmine patches stating that it needs to be the brand-name Exelon patches, which are not covered by the patient's insurance.     Called [employer] and confirmed that the patient still has active coverage through the Assistance Fund through 12/31/19. See info below. They advised that the pharmacy call the help desk at [phone].        Called [facility] back and advised they call the pharmacy help desk above.     Zainab Sanches, Pharm.D.  Medication Therapy Management Pharmacist  Phone: [phone]

## 2019-09-27 NOTE — TELEPHONE ENCOUNTER
Called [facility] pharmacy and confirmed that they were able to use the Assistance Fund and there is $0 charge on the prescription.    Zainab Sanches, Pharm.D.  Medication Therapy Management Pharmacist  Phone: [phone]

## 2019-10-04 ENCOUNTER — PATIENT OUTREACH (OUTPATIENT)
Dept: CARE COORDINATION | Facility: CLINIC | Age: 74
End: 2019-10-04

## 2019-10-07 NOTE — PROGRESS NOTES
Social Work Intervention  [facility]    Data/Intervention:    Patient Name:  Paige Mercado  /Age:  1945 (74 year old)    Visit Type: telephone  Referral Source: Zainab Sanches [facility]  Reason for Referral:  Care options    Collaborated With:    -Pt's dtr Ibis Bethea  -Dr Jensen Holder    Patient Concerns/Issues:   Contacted Pt's dtr who is struggling with managing Pt's needs at home. Paige lives in her own town home near her only child Ibis. She has had struggles with severe anxiety, confusion, hallucinations. These were exacerbated recently after a visit from a friend in which there was something anxiety provoking that was discussed and the Pt became unable to take her medications correctly and seemed to be in an ongoing panic attack. Dtr reports that she has needed to visit several times a day and in the past day things have improved somewhat. The concern of jessika is that her own dtr had a surgery and is needing care as well so she doesn't have the ability to be as involved with her Mother. She is also just getting worn out. She indicated that the worse time of days is 3-6 and again in the evening before bedtime.   Over time, the dtr indicated that her Mother's ability to withstand any changes, to be out of her home or to engage with other people has diminished so that when she does those things, she becomes extremely anxious and cannot function.   She sees a psychiatrist in the [facility] system.   The only services she has is house cleaning. Dtr doesn't think she will tolerate having others come into her home and she will not go to stay at [facility].     Intervention/Education/Resources Provided:  Reviewed her situation. Discussed finances and she would not qualify for [facility] funded services at home. Discussed [employer]. Discussed using a Life care manager service like what [facility] offers where a nurse would visit at least weekly to set up meds, assess symptoms,   get to know her and help facilitate care/treatment including extra hours of care at home by a home health aide or homemaker. The nurse to set up a medication machine for her meds. Also discussed future out of home placement in either assisted living (with memory care available or residential care)  And lastly we discussed what she could do urgently if her behavior worsens. Suggested that she could go to the ED at [facility] in [facility] as they have a geriatric psych unit that she could be evaluated for admission into.     Assessment/Plan:  Ibis appreciated the above info. I encouraged her to contact me as needed for assistance with care planning for her Mother.    Provided patient/family with contact information and availability.    DAVID Concepcion, [facility]    [facility]  [phone]

## 2019-10-17 ENCOUNTER — TELEPHONE (OUTPATIENT)
Dept: NEUROLOGY | Facility: CLINIC | Age: 74
End: 2019-10-17

## 2019-10-17 NOTE — TELEPHONE ENCOUNTER
"Left voice mail for patient's daughter asking her to call back with a good time to reach her. I also stated that if she sends a MyChart to Dr. Styles, I will get that message.    Called patient to see how she is doing. She stated she is not doing well and when I asked why, she could not think of the word \"anxiety\". Patient had a very difficult time coming up with words to use to express herself.    Yesterday they looked at an assisted living in [place]. \"It's a wonderful thing, but I did not fit in. They are too old.\"    I told her that I think she needs to come in to see Alyssa Santos to help sort things out as this is too complex to solve over the phone. She was agreeable but it has to be scheduled through her daughter since she drives her. I also note that there are three canceled appointments with Alyssa. She is aware that her cognitive ability is greatly diminished.    Will await call back from daughter Ibis.  "

## 2019-10-18 NOTE — TELEPHONE ENCOUNTER
Spoke to patient's daughter Rosalva. She is very concerned about her mother living alone in light of her recent memory issues. Patient's cognition has deteriorated rather quickly and does not seem to reflect a typical Parkinson's disease dementia picture. Patient forgets how to use the phone, forgets how to use the microwave, cannot keep up a conversation due to loss of words.    I suggested a neuropsychological evaluation but Rosalva states she does not want to put her mother through that since she can't even tolerate 45 minutes at her house without becoming extremely anxious. I mentioned that occupational therapy can help with cognitive testing and ideas to aide people living with memory issues. She stated that her mother becomes almost panicked when it is time to leave for any appointment.     Rosalva would like her mom to have occupational therapy through home care. This option requires a face to face office visit with a physician within 30 days of the order. An appointment has been made with Dr. Styles for his earliest available which is 11/29. I have asked to have her placed on a call cancel list in case something comes up earlier.     I reminded Rosalva that if her mom's behavior gets out of control again, to take her to the emergency department at [facility] in [place]. She may then be able to be admitted to the Geriatric Psych unit.    Another option is to offer a referral to the memory care clinic in [place] with Dr. Brunson. I will see what Dr. Styles recommends.

## 2019-10-28 PROBLEM — F41.9 ANXIETY: Status: RESOLVED | Noted: 2018-02-28 | Resolved: 2019-10-28

## 2019-10-29 ENCOUNTER — TELEPHONE (OUTPATIENT)
Dept: FAMILY MEDICINE | Facility: CLINIC | Age: 74
End: 2019-10-29

## 2019-10-29 ENCOUNTER — OFFICE VISIT (OUTPATIENT)
Dept: FAMILY MEDICINE | Facility: CLINIC | Age: 74
End: 2019-10-29
Payer: MEDICARE

## 2019-10-29 VITALS
HEART RATE: 94 BPM | DIASTOLIC BLOOD PRESSURE: 80 MMHG | WEIGHT: 110 LBS | BODY MASS INDEX: 19.49 KG/M2 | HEIGHT: 63 IN | TEMPERATURE: 97.8 F | RESPIRATION RATE: 18 BRPM | OXYGEN SATURATION: 96 % | SYSTOLIC BLOOD PRESSURE: 120 MMHG

## 2019-10-29 DIAGNOSIS — R62.7 FAILURE TO THRIVE IN ADULT: ICD-10-CM

## 2019-10-29 DIAGNOSIS — R41.89 COGNITIVE IMPAIRMENT: ICD-10-CM

## 2019-10-29 DIAGNOSIS — F41.1 GENERALIZED ANXIETY DISORDER: ICD-10-CM

## 2019-10-29 DIAGNOSIS — G20.A1 PARKINSON'S DISEASE (H): ICD-10-CM

## 2019-10-29 DIAGNOSIS — Z00.00 ROUTINE HISTORY AND PHYSICAL EXAMINATION OF ADULT: Primary | ICD-10-CM

## 2019-10-29 DIAGNOSIS — E78.49 OTHER HYPERLIPIDEMIA: ICD-10-CM

## 2019-10-29 DIAGNOSIS — E03.9 HYPOTHYROIDISM, UNSPECIFIED TYPE: ICD-10-CM

## 2019-10-29 DIAGNOSIS — R63.4 WEIGHT LOSS: ICD-10-CM

## 2019-10-29 PROCEDURE — G0439 PPPS, SUBSEQ VISIT: HCPCS | Performed by: PHYSICIAN ASSISTANT

## 2019-10-29 RX ORDER — LEVOTHYROXINE SODIUM 50 UG/1
TABLET ORAL
Qty: 90 TABLET | Refills: 3 | Status: SHIPPED | OUTPATIENT
Start: 2019-10-29 | End: 2020-01-01

## 2019-10-29 RX ORDER — SIMVASTATIN 20 MG
TABLET ORAL
Qty: 90 TABLET | Refills: 3 | Status: SHIPPED | OUTPATIENT
Start: 2019-10-29 | End: 2020-01-01

## 2019-10-29 RX ORDER — CITALOPRAM HYDROBROMIDE 40 MG/1
TABLET ORAL
Qty: 90 TABLET | Refills: 3 | Status: SHIPPED | OUTPATIENT
Start: 2019-10-29 | End: 2020-01-01

## 2019-10-29 SDOH — HEALTH STABILITY: MENTAL HEALTH: HOW OFTEN DO YOU HAVE A DRINK CONTAINING ALCOHOL?: NEVER

## 2019-10-29 ASSESSMENT — ACTIVITIES OF DAILY LIVING (ADL)
CURRENT_FUNCTION: TRANSPORTATION REQUIRES ASSISTANCE
CURRENT_FUNCTION: HOUSEWORK REQUIRES ASSISTANCE
CURRENT_FUNCTION: MEDICATION ADMINISTRATION REQUIRES ASSISTANCE
CURRENT_FUNCTION: MONEY MANAGEMENT REQUIRES ASSISTANCE
CURRENT_FUNCTION: PREPARING MEALS REQUIRES ASSISTANCE
CURRENT_FUNCTION: TELEPHONE REQUIRES ASSISTANCE
CURRENT_FUNCTION: SHOPPING REQUIRES ASSISTANCE
CURRENT_FUNCTION: LAUNDRY REQUIRES ASSISTANCE

## 2019-10-29 ASSESSMENT — MIFFLIN-ST. JEOR: SCORE: 958.09

## 2019-10-29 NOTE — PROGRESS NOTES
"SUBJECTIVE:   Paige Mercado is a 74 year old female who presents for Preventive Visit here with daughter Rosalva who  helps with history due to mother's cognitive impairment.  Are you in the first 12 months of your Medicare coverage?  No    Healthy Habits:     In general, how would you rate your overall health?  Poor    Frequency of exercise:  None    Do you usually eat at least 4 servings of fruit and vegetables a day, include whole grains    & fiber and avoid regularly eating high fat or \"junk\" foods?  No    Taking medications regularly:  No    Barriers to taking medications:  Problems remembering to take them    Medication side effects:  Muscle aches and Other    Ability to successfully perform activities of daily living:  Telephone requires assistance, transportation requires assistance, shopping requires assistance, preparing meals requires assistance, housework requires assistance, laundry requires assistance, medication administration requires assistance and money management requires assistance    Home Safety:  No safety concerns identified    Hearing Impairment:  No hearing concerns    In the past 6 months, have you been bothered by leaking of urine? Yes    In general, how would you rate your overall mental or emotional health?  Fair      PHQ-2 Total Score: 2    Additional concerns today:  No    Do you feel safe in your environment? Yes    Have you ever done Advance Care Planning? (For example, a Health Directive, POLST, or a discussion with a medical provider about your wishes): Yes, advance care planning is on file.      Fall risk  Fallen 2 or more times in the past year?: Yes  Any fall with injury in the past year?: No  Timed Up and Go Test (>13.5 is fall risk; contact physician) : 10    Cognitive Screening Not appropriate due to known dementia    Do you have sleep apnea, excessive snoring or daytime drowsiness?: no    Reviewed and updated as needed this visit by clinical staff  Tobacco  Allergies  Meds "  Problems  Med Hx  Surg Hx  Fam Hx  Soc Hx          Reviewed and updated as needed this visit by Provider  Tobacco  Allergies  Meds  Problems  Med Hx  Surg Hx  Fam Hx  Soc Hx         Social History     Tobacco Use     Smoking status: Never Smoker     Smokeless tobacco: Never Used   Substance Use Topics     Alcohol use: Not Currently     Frequency: Never       Alcohol Use 10/29/2019   Prescreen: >3 drinks/day or >7 drinks/week? No     Current providers sharing in care for this patient include:   Patient Care Team:  Ely Henderson MD as PCP - General (Internal Medicine)  Alyson Mcgee MD as MD (Urology)  Trixie Aldridge, RN as Registered Nurse (Urology)  Keith Styles MD as MD (Neurology)  Regla Santos APRN CNP as Nurse Practitioner (Nurse Practitioner)  Kerry Howe RN as Specialty Care Coordinator  Zainab Sanches [facility] as Pharmacist (Pharmacist)  Dorys Kingsley NP as Assigned PCP    The following health maintenance items are reviewed in Epic and correct as of today:  Health Maintenance   Topic Date Due     DEXA  1945     ADVANCE CARE PLANNING  1945     ZOSTER IMMUNIZATION (1 of 2) 07/18/1993     FALL RISK ASSESSMENT  10/10/2019     MEDICARE ANNUAL WELLNESS VISIT  10/10/2019     PHQ-9  03/17/2020     DTAP/TDAP/TD IMMUNIZATION (5 - Td) 04/25/2022     LIPID  10/19/2023     COLONOSCOPY  03/15/2028     DEPRESSION ACTION PLAN  Completed     INFLUENZA VACCINE  Completed     PNEUMOCOCCAL IMMUNIZATION 65+ LOW/MEDIUM RISK  Completed     IPV IMMUNIZATION  Aged Out     MENINGITIS IMMUNIZATION  Aged Out     BP Readings from Last 3 Encounters:   10/29/19 120/80   09/17/19 132/76   07/31/19 128/66    Wt Readings from Last 3 Encounters:   10/29/19 49.9 kg (110 lb)   07/31/19 57.2 kg (126 lb)   06/11/19 58.3 kg (128 lb 9.6 oz)                  Recent Labs   Lab Test 07/31/19  1916 10/19/18  0816  12/31/17  1530   LDL  --  69  --   --    HDL  --  70  --   --    TRIG  --  " 37  --   --    ALT 14 9  --   --    CR 1.02 0.72   < > 0.77   GFRESTIMATED 53* 79   < > 74   GFRESTBLACK 62 >90   < > 89   POTASSIUM 4.2 4.1   < > 4.2   TSH 2.08  --   --  2.62    < > = values in this interval not displayed.      Review of Systems  Constitutional, HEENT, cardiovascular, pulmonary, GI, , musculoskeletal, neuro, skin, endocrine and psych systems are negative, except as otherwise noted.    OBJECTIVE:   /80 (Patient Position: Sitting, Cuff Size: Adult Regular)   Pulse 94   Temp 97.8  F (36.6  C) (Tympanic)   Resp 18   Ht 1.6 m (5' 3\")   Wt 49.9 kg (110 lb)   SpO2 96%   BMI 19.49 kg/m   Estimated body mass index is 19.49 kg/m  as calculated from the following:    Height as of this encounter: 1.6 m (5' 3\").    Weight as of this encounter: 49.9 kg (110 lb).  Physical Exam  GENERAL APPEARANCE: healthy, alert and no distress  EYES: Eyes grossly normal to inspection, PERRL and conjunctivae and sclerae normal  HENT: ear canals and TM's normal, nose and mouth without ulcers or lesions, oropharynx clear and oral mucous membranes moist  NECK: no adenopathy, no asymmetry, masses, or scars and thyroid normal to palpation  RESP: lungs clear to auscultation - no rales, rhonchi or wheezes  CV: regular rate and rhythm, normal S1 S2, no S3 or S4, no murmur, click or rub, no peripheral edema and peripheral pulses strong  SKIN: no suspicious lesions or rashes  NEURO: Normal strength and tone, speech with abrupt stops and difficulty finding words and tremor, memory impairment  PSYCH: anxious, concentration poor, confused and disoriented    Diagnostic Test Results:  Labs reviewed in Epic    ASSESSMENT / PLAN:       ICD-10-CM    1. Routine history and physical examination of adult Z00.00    2. Cognitive impairment R41.89 HOME CARE NURSING REFERRAL   3. Parkinson's disease (H) G20 HOME CARE NURSING REFERRAL   4. Failure to thrive in adult R62.7 HOME CARE NURSING REFERRAL   5. Hypothyroidism, unspecified type " "E03.9 levothyroxine (SYNTHROID) 50 MCG tablet   6. Generalized anxiety disorder F41.1 citalopram (CELEXA) 40 MG tablet   7. Weight loss R63.4    8. Other hyperlipidemia E78.49 simvastatin (ZOCOR) 20 MG tablet   Home care orders placed.  Pt moving to assisted living facility this weekend.  Will see what resources they have their and what the pt qualifies for.  Labs UTD.  Medications refilled.  Family does not want further cognitive work up.  Recommended starting fiber supplement in place of miralax for constipation.    COUNSELING:  Reviewed preventive health counseling, as reflected in patient instructions    Estimated body mass index is 19.49 kg/m  as calculated from the following:    Height as of this encounter: 1.6 m (5' 3\").    Weight as of this encounter: 49.9 kg (110 lb).    Weight management plan noted, stable and monitoring     reports that she has never smoked. She has never used smokeless tobacco.      Appropriate preventive services were discussed with this patient, including applicable screening as appropriate for cardiovascular disease, diabetes, osteopenia/osteoporosis, and glaucoma.  As appropriate for age/gender, discussed screening for colorectal cancer, prostate cancer, breast cancer, and cervical cancer. Checklist reviewing preventive services available has been given to the patient.    Reviewed patients plan of care and provided an AVS. The Complex Care Plan (for patients with higher acuity and needing more deliberate coordination of services) for Paige meets the Care Plan requirement. This Care Plan has been established and reviewed with the Patient and daughter.    Counseling Resources:  ATP IV Guidelines  Pooled Cohorts Equation Calculator  Breast Cancer Risk Calculator  FRAX Risk Assessment  ICSI Preventive Guidelines  Dietary Guidelines for Americans, 2010  USDA's MyPlate  ASA Prophylaxis  Lung CA Screening    Ashley Bowie PA-C  [facility] " [facility]    Identified Health Risks:

## 2019-10-29 NOTE — PATIENT INSTRUCTIONS
Patient Education   Personalized Prevention Plan  You are due for the preventive services outlined below.  Your care team is available to assist you in scheduling these services.  If you have already completed any of these items, please share that information with your care team to update in your medical record.  Health Maintenance Due   Topic Date Due     Osteoporosis Screening  1945     Discuss Advance Care Planning  1945     Zoster (Shingles) Vaccine (1 of 2) 07/18/1993     FALL RISK ASSESSMENT  10/10/2019     Annual Wellness Visit  10/10/2019

## 2019-10-29 NOTE — TELEPHONE ENCOUNTER
Homecare calling because homecare referral is only for OT and per medicare guidelines they cant just have someone go out for just OT it has to be for OT and PT or OT and skilled nursing.  Please change order and resend.  Also FYI because of pt address [facility] will be going to pt home for home care.

## 2019-10-30 NOTE — TELEPHONE ENCOUNTER
Patient daughter was notified with information noted by provider and agreed with plan.  Daughter will call clinic if additional orders are needed.  Per daughter she did request both OT and PT.  SKYLER BairdN, RN  [employer] T[facility]

## 2019-10-30 NOTE — TELEPHONE ENCOUNTER
Pt is moving this weekend into assisted living.  Let's see what services she qualifies for there first and see if she needs any orders.  Please check with daughter Rosalva.

## 2019-10-31 ENCOUNTER — TELEPHONE (OUTPATIENT)
Dept: FAMILY MEDICINE | Facility: CLINIC | Age: 74
End: 2019-10-31

## 2019-10-31 NOTE — TELEPHONE ENCOUNTER
Our goal is to have forms completed within 72 hours, however some forms may require a visit or additional information.    What clinic location was the form placed at [facility] or [place].?     Who is the form from?   Where did the form come from? Faxed to clinic   The form was placed in the inbox of LUIS Shukla      Please fax to [phone]  Phone number: [phone]    Additional comments: [facility]- Physician order sheet (admission orders to assisted living)    Call take on 10/31/2019 at 4:15 PM by Amber Fairchild

## 2019-11-04 ENCOUNTER — HEALTH MAINTENANCE LETTER (OUTPATIENT)
Age: 74
End: 2019-11-04

## 2019-11-26 ENCOUNTER — RECORDS - HEALTHEAST (OUTPATIENT)
Dept: LAB | Facility: CLINIC | Age: 74
End: 2019-11-26

## 2019-11-26 LAB
ALBUMIN UR-MCNC: ABNORMAL MG/DL
APPEARANCE UR: ABNORMAL
BACTERIA #/AREA URNS HPF: ABNORMAL HPF
BILIRUB UR QL STRIP: NEGATIVE
COLOR UR AUTO: YELLOW
GLUCOSE UR STRIP-MCNC: NEGATIVE MG/DL
HGB UR QL STRIP: NEGATIVE
KETONES UR STRIP-MCNC: ABNORMAL MG/DL
LEUKOCYTE ESTERASE UR QL STRIP: ABNORMAL
NITRATE UR QL: POSITIVE
PH UR STRIP: 6 [PH] (ref 4.5–8)
RBC #/AREA URNS AUTO: ABNORMAL HPF
SP GR UR STRIP: 1.02 (ref 1–1.03)
SQUAMOUS #/AREA URNS AUTO: ABNORMAL LPF
UROBILINOGEN UR STRIP-ACNC: ABNORMAL
WBC #/AREA URNS AUTO: >100 HPF

## 2019-11-28 LAB — BACTERIA SPEC CULT: ABNORMAL

## 2020-01-01 ENCOUNTER — HEALTH MAINTENANCE LETTER (OUTPATIENT)
Age: 75
End: 2020-01-01

## 2020-01-01 ENCOUNTER — VIRTUAL VISIT (OUTPATIENT)
Dept: NEUROLOGY | Facility: CLINIC | Age: 75
End: 2020-01-01
Payer: MEDICARE

## 2020-01-01 ENCOUNTER — RECORDS - HEALTHEAST (OUTPATIENT)
Dept: LAB | Facility: CLINIC | Age: 75
End: 2020-01-01

## 2020-01-01 ENCOUNTER — ALLIED HEALTH/NURSE VISIT (OUTPATIENT)
Dept: PHARMACY | Facility: CLINIC | Age: 75
End: 2020-01-01
Payer: COMMERCIAL

## 2020-01-01 DIAGNOSIS — K21.9 GASTROESOPHAGEAL REFLUX DISEASE, ESOPHAGITIS PRESENCE NOT SPECIFIED: ICD-10-CM

## 2020-01-01 DIAGNOSIS — E03.9 HYPOTHYROIDISM, UNSPECIFIED TYPE: ICD-10-CM

## 2020-01-01 DIAGNOSIS — G20.A1 PARKINSON'S DISEASE (H): ICD-10-CM

## 2020-01-01 DIAGNOSIS — F32.A DEPRESSION, UNSPECIFIED DEPRESSION TYPE: ICD-10-CM

## 2020-01-01 DIAGNOSIS — R41.3 MEMORY PROBLEM: ICD-10-CM

## 2020-01-01 DIAGNOSIS — K59.00 CONSTIPATION, UNSPECIFIED CONSTIPATION TYPE: ICD-10-CM

## 2020-01-01 DIAGNOSIS — E56.9 VITAMIN DEFICIENCY: ICD-10-CM

## 2020-01-01 DIAGNOSIS — J30.2 SEASONAL ALLERGIC RHINITIS, UNSPECIFIED TRIGGER: ICD-10-CM

## 2020-01-01 DIAGNOSIS — F41.9 ANXIETY: ICD-10-CM

## 2020-01-01 DIAGNOSIS — G20.A1 PARKINSON'S DISEASE (H): Primary | ICD-10-CM

## 2020-01-01 DIAGNOSIS — F29 PSYCHOSIS, UNSPECIFIED PSYCHOSIS TYPE (H): ICD-10-CM

## 2020-01-01 DIAGNOSIS — G47.00 INSOMNIA, UNSPECIFIED TYPE: ICD-10-CM

## 2020-01-01 DIAGNOSIS — G20.A1 PARKINSON DISEASE (H): Primary | ICD-10-CM

## 2020-01-01 LAB
ALBUMIN UR-MCNC: ABNORMAL MG/DL
ANION GAP SERPL CALCULATED.3IONS-SCNC: 9 MMOL/L (ref 5–18)
APPEARANCE UR: ABNORMAL
BACTERIA #/AREA URNS HPF: ABNORMAL HPF
BACTERIA SPEC CULT: NO GROWTH
BILIRUB UR QL STRIP: NEGATIVE
BUN SERPL-MCNC: 10 MG/DL (ref 8–28)
CALCIUM SERPL-MCNC: 9.4 MG/DL (ref 8.5–10.5)
CHLORIDE BLD-SCNC: 104 MMOL/L (ref 98–107)
CO2 SERPL-SCNC: 26 MMOL/L (ref 22–31)
COLOR UR AUTO: YELLOW
CREAT SERPL-MCNC: 0.74 MG/DL (ref 0.6–1.1)
ERYTHROCYTE [DISTWIDTH] IN BLOOD BY AUTOMATED COUNT: 13.6 % (ref 11–14.5)
GFR SERPL CREATININE-BSD FRML MDRD: >60 ML/MIN/1.73M2
GLUCOSE BLD-MCNC: 119 MG/DL (ref 70–125)
GLUCOSE UR STRIP-MCNC: NEGATIVE MG/DL
HCT VFR BLD AUTO: 42.4 % (ref 35–47)
HGB BLD-MCNC: 13.3 G/DL (ref 12–16)
HGB UR QL STRIP: NEGATIVE
HYALINE CASTS #/AREA URNS LPF: ABNORMAL LPF
KETONES UR STRIP-MCNC: ABNORMAL MG/DL
LEUKOCYTE ESTERASE UR QL STRIP: ABNORMAL
MCH RBC QN AUTO: 29.6 PG (ref 27–34)
MCHC RBC AUTO-ENTMCNC: 31.4 G/DL (ref 32–36)
MCV RBC AUTO: 94 FL (ref 80–100)
MUCOUS THREADS #/AREA URNS LPF: ABNORMAL LPF
NITRATE UR QL: NEGATIVE
PH UR STRIP: 6.5 [PH] (ref 4.5–8)
PLATELET # BLD AUTO: 465 THOU/UL (ref 140–440)
PMV BLD AUTO: 8.9 FL (ref 8.5–12.5)
POTASSIUM BLD-SCNC: 3.4 MMOL/L (ref 3.5–5)
RBC # BLD AUTO: 4.5 MILL/UL (ref 3.8–5.4)
RBC #/AREA URNS AUTO: ABNORMAL HPF
SODIUM SERPL-SCNC: 139 MMOL/L (ref 136–145)
SP GR UR STRIP: 1.02 (ref 1–1.03)
SQUAMOUS #/AREA URNS AUTO: ABNORMAL LPF
TSH SERPL DL<=0.005 MIU/L-ACNC: 2.38 UIU/ML (ref 0.3–5)
UROBILINOGEN UR STRIP-ACNC: ABNORMAL
WBC #/AREA URNS AUTO: ABNORMAL HPF
WBC CLUMPS #/AREA URNS HPF: PRESENT /[HPF]
WBC: 7.2 THOU/UL (ref 4–11)

## 2020-01-01 PROCEDURE — 99605 MTMS BY PHARM NP 15 MIN: CPT | Performed by: PHARMACIST

## 2020-01-01 RX ORDER — MIRTAZAPINE 30 MG/1
TABLET, FILM COATED ORAL
COMMUNITY
Start: 2020-01-01 | End: 2020-01-01

## 2020-01-01 RX ORDER — CARBIDOPA AND LEVODOPA 25; 100 MG/1; MG/1
TABLET ORAL
COMMUNITY
Start: 2020-01-01

## 2020-01-01 RX ORDER — LORAZEPAM 2 MG/ML
0.12 CONCENTRATE ORAL
COMMUNITY
Start: 2019-11-13 | End: 2020-01-01

## 2020-01-01 RX ORDER — ACETAMINOPHEN 325 MG/1
650 TABLET ORAL EVERY 4 HOURS PRN
COMMUNITY
Start: 2020-01-01

## 2020-01-01 RX ORDER — QUETIAPINE FUMARATE 100 MG/1
TABLET, FILM COATED ORAL
Qty: 60 TABLET | Refills: 11 | COMMUNITY
Start: 2020-01-01 | End: 2020-01-01

## 2020-01-01 RX ORDER — SENNOSIDES A AND B 8.6 MG/1
TABLET, FILM COATED ORAL
COMMUNITY
Start: 2020-01-01 | End: 2020-01-01

## 2020-01-01 RX ORDER — QUETIAPINE FUMARATE 100 MG/1
TABLET, FILM COATED ORAL
Qty: 60 TABLET | Refills: 11 | COMMUNITY
Start: 2020-01-01

## 2020-01-01 RX ORDER — LANOLIN ALCOHOL/MO/W.PET/CERES
CREAM (GRAM) TOPICAL
COMMUNITY
Start: 2020-01-01

## 2020-01-01 RX ORDER — MIRTAZAPINE 15 MG/1
TABLET, FILM COATED ORAL
Refills: 0 | COMMUNITY
Start: 2020-01-01

## 2020-01-01 RX ORDER — QUETIAPINE FUMARATE 25 MG/1
TABLET, FILM COATED ORAL
COMMUNITY
Start: 2020-01-01 | End: 2020-01-01

## 2020-01-01 RX ORDER — LORAZEPAM 2 MG/ML
0.12 CONCENTRATE ORAL EVERY 12 HOURS
COMMUNITY
Start: 2019-11-09 | End: 2020-01-01

## 2020-01-01 RX ORDER — TRAZODONE HYDROCHLORIDE 50 MG/1
TABLET, FILM COATED ORAL
COMMUNITY
Start: 2020-01-01 | End: 2020-01-01

## 2020-01-01 RX ORDER — CARBIDOPA AND LEVODOPA 25; 100 MG/1; MG/1
2 TABLET ORAL
COMMUNITY
Start: 2019-11-09 | End: 2020-01-01

## 2020-01-01 RX ORDER — LANOLIN ALCOHOL/MO/W.PET/CERES
3 CREAM (GRAM) TOPICAL AT BEDTIME
COMMUNITY
Start: 2019-11-09 | End: 2020-01-01

## 2020-01-01 RX ORDER — LEVOTHYROXINE SODIUM 50 UG/1
TABLET ORAL
Qty: 90 TABLET | Refills: 3 | COMMUNITY
Start: 2020-01-01

## 2020-01-01 RX ORDER — TRAZODONE HYDROCHLORIDE 50 MG/1
25 TABLET, FILM COATED ORAL
COMMUNITY
Start: 2019-11-09 | End: 2020-01-01

## 2020-01-01 RX ORDER — QUETIAPINE FUMARATE 25 MG/1
TABLET, FILM COATED ORAL
COMMUNITY
Start: 2020-01-01

## 2020-01-01 RX ORDER — ACETAMINOPHEN 325 MG/1
650 TABLET ORAL
COMMUNITY
Start: 2019-11-09 | End: 2020-01-01

## 2020-01-01 RX ORDER — SUMATRIPTAN 25 MG/1
25 TABLET, FILM COATED ORAL
Qty: 30 TABLET | COMMUNITY
Start: 2020-01-01

## 2020-01-01 RX ORDER — POLYETHYLENE GLYCOL 3350 17 G/17G
POWDER, FOR SOLUTION ORAL
Qty: 7 PACKET | COMMUNITY
Start: 2020-01-01

## 2020-01-01 RX ORDER — MAGNESIUM 200 MG
TABLET ORAL
COMMUNITY
Start: 2020-01-01

## 2020-01-01 RX ORDER — MEMANTINE HYDROCHLORIDE 10 MG/1
TABLET ORAL
COMMUNITY
Start: 2020-01-01

## 2020-01-01 RX ORDER — MIRTAZAPINE 30 MG/1
30 TABLET, FILM COATED ORAL AT BEDTIME
COMMUNITY
Start: 2019-10-15 | End: 2020-01-01

## 2020-01-01 RX ORDER — CALCIUM CARBONATE 500 MG/1
1 TABLET, CHEWABLE ORAL DAILY PRN
COMMUNITY
Start: 2020-01-01

## 2020-01-01 RX ORDER — QUETIAPINE FUMARATE 25 MG/1
12.5 TABLET, FILM COATED ORAL 3 TIMES DAILY PRN
COMMUNITY
Start: 2019-10-01 | End: 2020-01-01

## 2020-01-01 RX ORDER — MEMANTINE HYDROCHLORIDE 10 MG/1
10 TABLET ORAL 2 TIMES DAILY
COMMUNITY
End: 2020-01-01

## 2020-01-01 RX ORDER — FLUTICASONE PROPIONATE 50 MCG
SPRAY, SUSPENSION (ML) NASAL
COMMUNITY
Start: 2020-01-01

## 2020-01-01 RX ORDER — CITALOPRAM HYDROBROMIDE 20 MG/1
TABLET ORAL
COMMUNITY
Start: 2020-01-01

## 2020-01-01 RX ORDER — RIVASTIGMINE 13.3 MG/24H
1 PATCH, EXTENDED RELEASE TRANSDERMAL DAILY
Qty: 90 PATCH | Refills: 3 | COMMUNITY
Start: 2020-01-01

## 2020-01-01 RX ORDER — QUETIAPINE FUMARATE 50 MG/1
150 TABLET, FILM COATED ORAL AT BEDTIME
COMMUNITY
Start: 2019-11-09 | End: 2020-01-01

## 2020-01-01 RX ORDER — SENNOSIDES A AND B 8.6 MG/1
TABLET, FILM COATED ORAL
Qty: 360 TABLET | Refills: 3 | COMMUNITY
Start: 2020-01-01

## 2020-01-01 RX ORDER — TRAZODONE HYDROCHLORIDE 50 MG/1
TABLET, FILM COATED ORAL
COMMUNITY
Start: 2020-01-01

## 2020-01-01 RX ORDER — QUETIAPINE FUMARATE 50 MG/1
TABLET, FILM COATED ORAL
COMMUNITY
Start: 2020-01-01 | End: 2020-01-01

## 2020-01-01 RX ORDER — MULTIVIT-MIN/IRON/FOLIC ACID/K 18-600-40
CAPSULE ORAL
Qty: 30 CAPSULE | COMMUNITY
Start: 2020-01-01

## 2020-01-01 RX ORDER — CITALOPRAM HYDROBROMIDE 20 MG/1
20 TABLET ORAL AT BEDTIME
COMMUNITY
Start: 2019-11-09 | End: 2020-01-01

## 2020-01-01 RX ORDER — MAGNESIUM 200 MG
1000 TABLET ORAL DAILY
COMMUNITY
Start: 2019-11-09 | End: 2020-01-01

## 2020-02-05 ENCOUNTER — RECORDS - HEALTHEAST (OUTPATIENT)
Dept: LAB | Facility: CLINIC | Age: 75
End: 2020-02-05

## 2020-02-05 LAB
ANION GAP SERPL CALCULATED.3IONS-SCNC: 7 MMOL/L (ref 5–18)
BUN SERPL-MCNC: 10 MG/DL (ref 8–28)
CALCIUM SERPL-MCNC: 9.7 MG/DL (ref 8.5–10.5)
CHLORIDE BLD-SCNC: 105 MMOL/L (ref 98–107)
CO2 SERPL-SCNC: 31 MMOL/L (ref 22–31)
CREAT SERPL-MCNC: 0.7 MG/DL (ref 0.6–1.1)
ERYTHROCYTE [DISTWIDTH] IN BLOOD BY AUTOMATED COUNT: 14 % (ref 11–14.5)
GFR SERPL CREATININE-BSD FRML MDRD: >60 ML/MIN/1.73M2
GLUCOSE BLD-MCNC: 91 MG/DL (ref 70–125)
HCT VFR BLD AUTO: 42.5 % (ref 35–47)
HGB BLD-MCNC: 13 G/DL (ref 12–16)
MAGNESIUM SERPL-MCNC: 1.9 MG/DL (ref 1.8–2.6)
MCH RBC QN AUTO: 29.3 PG (ref 27–34)
MCHC RBC AUTO-ENTMCNC: 30.6 G/DL (ref 32–36)
MCV RBC AUTO: 96 FL (ref 80–100)
PLATELET # BLD AUTO: 379 THOU/UL (ref 140–440)
PMV BLD AUTO: 9.1 FL (ref 8.5–12.5)
POTASSIUM BLD-SCNC: 4 MMOL/L (ref 3.5–5)
RBC # BLD AUTO: 4.43 MILL/UL (ref 3.8–5.4)
SODIUM SERPL-SCNC: 143 MMOL/L (ref 136–145)
TSH SERPL DL<=0.005 MIU/L-ACNC: 1.61 UIU/ML (ref 0.3–5)
VIT B12 SERPL-MCNC: 585 PG/ML (ref 213–816)
WBC: 5 THOU/UL (ref 4–11)

## 2020-02-06 LAB — 25(OH)D3 SERPL-MCNC: 21.9 NG/ML (ref 30–80)

## 2020-08-13 NOTE — PROGRESS NOTES
"MTM ENCOUNTER  SUBJECTIVE/OBJECTIVE:                           Paige Mercado is a 75 year old female called for a follow-up visit. She was referred to me from Dr. Styles and she saw Dr. Styles prior to our visit today.  Today's visit was conducted with daughter, Rosalva, as patient was sleeping during our visit and has cognitive impairment. Today's visit is a follow-up MTM visit from 9/17/19     Patient consented to a telehealth visit: yes  Telemedicine Visit Details  Type of service:  Telephone visit  Start Time: 3:31 PM  End Time: 3:43 PM  Originating Location (pt. Location): Home  Distant Location (provider location):  [facility] [place]  Mode of Communication:  Telephone    Chief Complaint: follow up on medications    Allergies/ADRs: Reviewed in EHR  Tobacco:  reports that she has never smoked. She has never used smokeless tobacco.  Alcohol: not currently using  Caffeine: not discussed  Activity:  minimal  PMH: Reviewed in EHR    Medication Adherence/Access: no issues reported - she is living at [facility]; they started hospice yesterday for more services    Parkinson's Disease:  Current medications include: Carbidopa-levodopa  mg 2 tablets at 6 am, 10 am, 2 pm, 6 pm, 9 pm. Daughter states that she suddenly started having increased falls and is being monitored more closely.     Psychosis/dementia: Patient is on rivastigmine 13.3 mg patch daily, memantine 10 mg BID, and quetiapine 12.5 mg at 10 am, 37.5 mg at 2 pm, and 150 mg at 8 pm. Patient hasn't needed a PRN dose of quetiapine lately since the daily dose was increased. Daughter does state she continues to have hallucinations and paranoia but they are hesitant to change medications as it could \"rock the boat.\"     Depression/anxiety/insomnia: Currently taking citalopram 20 mg daily, mirtazapine 15 mg nightly, melatonin 3 mg nightly. She is no longer taking trazodone. Daughter states she is confused which aggravates her mood but is " sleeping well generally.     GERD: Current medications include: Prilosec (omeprazole) 20 mg once daily. Pt c/o no current symptoms.  Patient feels that current regimen is effective.    Hypothyroidism: Patient is taking Synthroid 50 mcg daily. Patient is having the following symptoms: none.   TSH   Date Value Ref Range Status   07/31/2019 2.08 0.40 - 4.00 mU/L Final       Allergic rhinitis: Current medications include fluticasone nasal spray daily. Pt feels that current therapy is effective.     Constipation: Taking miralax + senna daily, which seems to work well for her.     Vitamins: Taking vitamin B12 1000 mcg daily and vitamin D 2000 units daily. No recent labs available.     Nursing home PRN orders:   Tums PRN  Visine PRN  Tylenol PRN   B12 1000 mcg daily    Today's Vitals: There were no vitals taken for this visit.  (telemed visit)    ASSESSMENT:                            Medication Adherence: excellent, no issues identified    Parkinson's Disease:  Slightly worsened but care facility will continue to monitor.    Psychosis/dementia: needs improvement. Discussed that clozapine may be an alternative medication that could be considered if quetiapine is not deemed effective enough. I am hesitant to try Nuplazid as it probably won't provide much added benefit to her higher-dose quetiapine.     Depression/anxiety/insomnia: stable.    GERD: stable.    Hypothyroidism: unable to assess without recent labs.      Allergic rhinitis: stable.    Constipation: stable.    Vitamins: unable to assess without recent labs.    PLAN:                            1. No medication changes recommended at this time.   2. Discussed that clozapine may be a reasonable alternative to quetiapine if deemed no longer effective enough.     I spent 12 minutes with this patient today. I offer these suggestions for consideration by dr. Styles. A copy of the visit note was provided to the patient's referring provider.    Will follow up in 1 year or  sooner if needed.    The patient declined a summary of these recommendations.     Zainab Sanches, Pharm.D.  Medication Therapy Management Pharmacist  Phone: [phone]

## 2020-08-13 NOTE — PATIENT INSTRUCTIONS
Assessment:  (G20) Parkinson disease (H)  (primary encounter diagnosis)    She got mag citrate and cleaned out.     Lunch 1230pm  Dinner 530pm  Likes to get up  She is awaken at 6am and then goes  Back to sleep.  Wakes up at 9am    Weight 110 lbs - was down to 102 lbs - had been on 160lbs.    Had fall and hit her head    She has more sun downing.     More confused.          Medications     6am 8am 10am 2p 6p 8p   9p   Acetaminophen tylenol 325mg As needed         Aspirin 81mg stopped                Calcium carbonate tums 500mg As needed              Carbidopa/levodopa 25/100 Sinemet  2  2 2   2    2     Cholecalciferol vitamin d 2000   1                  Citalopram celexa 20mg               1      Cyanocobalamin Vitamin B12 1000mcg sublingual  1        Fluticasone flonase 50mcg/act nasal spray   daily             Levothyroxine synthroid 50 mcg 1@6                   Lorazepam ativan 2mg/ml ?taking         Melatonin 3mg       1    MEMANTINE NAMENDA 10MG  1        1    Mirtazapine remeron 15mg           1     Nonformulary oxygenate magnesium 350mg oxypowder Not taking              Omeprazole prilosec 20mg  1        Polyethylene glycol miralax             cap      Quetiapine 100mg seroquel              1    Quetiapine 25mg seroquel   1/2 1.5    2    Rivastigmine exelon 13.3mg/24 hr 24 hr patch  1             Senna-senokot 8.6   1            Sumatriptan imitrex 25mg As needed                   Trazodone desyrel 50mg   stopped prn       prn                                 Plan:    Would increase the morning senna to 2 tabs and have 1-2 tabs by mouth every evening as needed    Ongoing monitoring of her medications and determine if we are going to make a medication change. f

## 2020-08-13 NOTE — PROGRESS NOTES
"Paige Mercado is a 75 year old female who is being evaluated via a billable video visit.      The patient has been notified of following:     \"This video visit will be conducted via a call between you and your physician/provider. We have found that certain health care needs can be provided without the need for an in-person physical exam.  This service lets us provide the care you need with a video conversation.  If a prescription is necessary we can send it directly to your pharmacy.  If lab work is needed we can place an order for that and you can then stop by our lab to have the test done at a later time.    Video visits are billed at different rates depending on your insurance coverage.  Please reach out to your insurance provider with any questions.    If during the course of the call the physician/provider feels a video visit is not appropriate, you will not be charged for this service.\"    Patient has given verbal consent for Video visit? Yes  How would you like to obtain your AVS? MyChart  If you are dropped from the video visit, the video invite should be resent to: Send to e-mail at: [email or web address]  Will anyone else be joining your video visit? No        Video-Visit Details    Type of service:  Video Visit    Video Start Time: 0  Video End Time: 0    Originating Location (pt. Location): Home    Distant Location (provider location):  [facility] NEUROLOGY     Platform used for Video Visit: Larissa Styles MD        "

## 2020-08-13 NOTE — LETTER
8/13/2020       RE: Paige Mercado  [address] [place] [address]     Dear Colleague,    Thank you for referring your patient, Paige Mercado, to the [facility] NEUROLOGY at [facility]. Please see a copy of my visit note below.    VIDEO VISIT - had to convert to a telephone visit     Date of Visit: August 13, 2020  Name: Paige Mercado  Date of Birth 1945 [address] [place] [address]  [phone] (H)  [phone] (M)  [email or web address]  mychart  No proxy  Ibis Simpson  [phone]     MyChart - If patient is dropped from the video visit, the video invite should be resent to: Send to e-mail at: [email or web address]  Verify [phone], video capable  f/u per daughter    Assessment:  (G20) Parkinson disease (H)  (primary encounter diagnosis)    She got mag citrate and cleaned out.     Lunch 1230pm  Dinner 530pm  Likes to get up  She is awaken at 6am and then goes  Back to sleep.  Wakes up at 9am    Weight 110 lbs - was down to 102 lbs - had been on 160lbs.    Had fall and hit her head    She has more sun downing.     More confused.          Medications     6am 8am 10am 2p 6p 8p   9p   Acetaminophen tylenol 325mg As needed         Aspirin 81mg stopped                Calcium carbonate tums 500mg As needed              Carbidopa/levodopa 25/100 Sinemet  2  2 2   2    2     Cholecalciferol vitamin d 2000   1                  Citalopram celexa 20mg               1      Cyanocobalamin Vitamin B12 1000mcg sublingual  1        Fluticasone flonase 50mcg/act nasal spray   daily             Levothyroxine synthroid 50 mcg 1@6                   Lorazepam ativan 2mg/ml ?taking         Melatonin 3mg       1    MEMANTINE NAMENDA 10MG  1        1    Mirtazapine remeron 15mg           1     Nonformulary oxygenate magnesium 350mg oxypowder Not taking              Omeprazole prilosec 20mg  1        Polyethylene glycol miralax             cap     "  Quetiapine 100mg seroquel              1    Quetiapine 25mg seroquel   1/2 1.5    2    Rivastigmine exelon 13.3mg/24 hr 24 hr patch  1             Senna-senokot 8.6   1            Sumatriptan imitrex 25mg As needed                   Trazodone desyrel 50mg   stopped prn       prn                                 Plan:    Would increase the morning senna to 2 tabs and have 1-2 tabs by mouth every evening as needed    Ongoing monitoring of her medications and determine if we are going to make a medication change. f    I have reviewed the note as documented above.  This accurately captures the substance of my conversation with the patient.  Patient contact time  25  minutes. Over 50% of this visit was spent in patient care and care coordination.     Keith Styles MD      ------------------------------------------------------------------------------------------------------------------------------------------------------------------------      Telephone-Visit Details    The patient has been notified of following:     \"After a review of the patient s situation, this visit was changed from an in-person visit to a telephone visit to reduce the risk of COVID-19 exposure.   The patient is being evaluated via a billable telephone visit.\"    \"This Telephone visit will be conducted via a call between you and your physician/provider. We have found that certain health care needs can be provided without the need for an in-person physical exam.  This service lets us provide the care you need with a telephone  conversation.  If a prescription is necessary we can send it directly to your pharmacy.  If lab work is needed we can place an order for that and you can then stop by our lab to have the test done at a later time.    If during the course of the call the physician/provider feels a telephone visit is not appropriate, you will not be charged for this service.\"     Patient has given verbal consent for Telephone visit? Yes    Type of " "service:  Telephone visit     Duration of Visit:     Originating Location (pt. Location): facility    Distant Location (provider location):  [facility] NEUROLOGY     Mode of Communication:  Telephone      Video-Visit Details    Had to convert to telephone visit    The patient has been notified of following:     \"After a review of the patient s situation, this visit was changed from an in-person visit to a video visit to reduce the risk of COVID-19 exposure.   The patient is being evaluated via a billable video visit.\"    \"This video visit will be conducted via a call between you and your physician/provider. We have found that certain health care needs can be provided without the need for an in-person physical exam.  This service lets us provide the care you need with a video conversation.  If a prescription is necessary we can send it directly to your pharmacy.  If lab work is needed we can place an order for that and you can then stop by our lab to have the test done at a later time.    If during the course of the call the physician/provider feels a video visit is not appropriate, you will not be charged for this service.\"     Patient has given verbal consent for Video visit? Yes    Patient would like the video invitation sent by:     Type of service:  Video Visit    Video Start Time:     Video End Time (time video stopped):     Duration:  minutes - see above    Originating Location (pt. Location):     Distant Location (provider location):  [facility]     Mode of Communication:  Video Conference via [employer] (and if not possible then doximity)      Keith Styles MD      --------------------------------------------------------------------------------------------------------------    Paige Mercado is a 75 year old female who is being evaluated via a billable video visit.      Charts reviewed  Consult from  Images reviewed        I have reviewed and updated the patient's Past Medical History, Social History, " "Family History and Medication List.    ALLERGIES  Xanax [alprazolam]; Ciprofloxacin; and Sulfa drugs    Lasts visit details if there was a last visit:         Medications                                                                                                                                                                                                              14 Review of systems  are negative except for   Patient Active Problem List   Diagnosis     Allergic rhinitis     Conversion disorder     Depression     Essential hypertension     Generalized anxiety disorder     H/O bone density study     HTN (hypertension), benign     Hyperlipidemia     Hyperplastic polyps of stomach     Lumbago     Migraine     Osteoarthrosis     Parkinson's disease (H)     Parotid mass     Prediabetes     Warthin's tumor     Cognitive impairment     Personal history of malignant neoplasm of breast     Hypothyroidism, unspecified type     Failure to thrive in adult        Allergies   Allergen Reactions     Xanax [Alprazolam] Other (See Comments)     \"zombie-like\" and confused     Ciprofloxacin GI Disturbance     Sulfa Drugs GI Disturbance     Past Surgical History:   Procedure Laterality Date     ARTHROSCOPY SHOULDER       EYE SURGERY Right     scheduled right cataract 1st may 2018     EYE SURGERY Left     scheduled left cataract may 2018     GYN SURGERY      hysterectomy - Partial?     HEMORRHOIDECTOMY       MASTECTOMY Right     breast cancer  2000 or so     PAROTIDECTOMY Left     Warthis tumor 2012     TONSILLECTOMY      3 yrs of age     Past Medical History:   Diagnosis Date     Cancer (H)     breast     Pyelonephritis 3/27/2008    Overview:  :  hospitalization :  2007     Social History     Socioeconomic History     Marital status:      Spouse name: Not on file     Number of children: Not on file     Years of education: Not on file     Highest education level: Not on file   Occupational History     Not on file "   Social Needs     Financial resource strain: Not on file     Food insecurity     Worry: Not on file     Inability: Not on file     Transportation needs     Medical: Not on file     Non-medical: Not on file   Tobacco Use     Smoking status: Never Smoker     Smokeless tobacco: Never Used   Substance and Sexual Activity     Alcohol use: Not Currently     Frequency: Never     Drug use: No     Sexual activity: Never   Lifestyle     Physical activity     Days per week: Not on file     Minutes per session: Not on file     Stress: Not on file   Relationships     Social connections     Talks on phone: Not on file     Gets together: Not on file     Attends [employer] service: Not on file     Active member of club or organization: Not on file     Attends meetings of clubs or organizations: Not on file     Relationship status: Not on file     Intimate partner violence     Fear of current or ex partner: Not on file     Emotionally abused: Not on file     Physically abused: Not on file     Forced sexual activity: Not on file   Other Topics Concern     Parent/sibling w/ CABG, MI or angioplasty before 65F 55M? No   Social History Narrative    lives in [facility]    Ibis Bethea is daughter     Family History   Problem Relation Age of Onset     Lung Cancer Mother      Cancer Father         throat     Diabetes Father      Heart Disease Maternal Grandfather      Uterine Cancer Maternal Aunt      Breast Cancer Paternal Aunt      Other - See Comments Daughter         lives in [facility]. home schooling 2 of 5 kdis     Current Outpatient Medications   Medication Sig Dispense Refill     acetaminophen (TYLENOL) 500 MG tablet Take 1 tablet (500 mg) by mouth every 4 hours as needed for mild pain       aspirin (ASPIRIN LOW DOSE) 81 MG tablet 81mg tablet @ NIGHT 30 tablet      calcium carbonate (TUMS) 500 MG chewable tablet Take 1 chew tab by mouth daily as needed for heartburn       carbidopa-levodopa (RYTARY) 48. MG CPCR per CR  capsule Take 3 capsules at 7 am, 2 capsules at 11 am, 2 capsules at 3 pm, and 1 capsule at 8 pm 270 capsule 11     Cholecalciferol (VITAMIN D) 2000 UNITS CAPS 2000 units @ 7am 30 capsule      citalopram (CELEXA) 40 MG tablet 40 mg tablet by mouth @ 10pm 90 tablet 3     levothyroxine (SYNTHROID) 50 MCG tablet 50mcg tablet by mouth @ 7am 90 tablet 3     memantine (NAMENDA) 5 MG tablet Take 1 tablet (5 mg) by mouth 2 times daily 60 tablet 11     mirtazapine (REMERON) 15 MG tablet Take 1 tablet by mouth nightly  0     NONFORMULARY Oxygenated magnesium - oxy powder 1 capsule = 350mg magnesium oxide as needed       order for DME Equipment being ordered: Vie light    Intranasal light therapy is to use the therapeutic effects of light on the body and brain via the nasal cavity. This is made possible through the use of a device that emits 810 nanometers of red infrared light. A user has to insert and clip on the light-emitting part of the device in one of his or her nostrils and leave it on for a specific amount of time. 1 Device 11     polyethylene glycol (MIRALAX/GLYCOLAX) packet 1/2 CAPFUL BY MOUTH NIGHTLY@9-10PM 7 packet      QUEtiapine (SEROQUEL) 100 MG tablet 100mg tablet @ 10pm; takes with 2 x 25 mg = 150 mg at night 60 tablet 11     QUEtiapine (SEROQUEL) 25 MG tablet 1 tab @ 7am and 2 tabs @ 10pm (along with 100mg at bedtime) and 25mg tab as needed 360 tablet 3     rivastigmine (EXELON) 13.3 MG/24HR 24 hr patch Place 1 patch onto the skin daily 90 patch 3     rOPINIRole (REQUIP) 1 MG tablet 1mg tablet by mouth @ 7am, 11am and 3pm 270 tablet 3     simvastatin (ZOCOR) 20 MG tablet 20mg tablet by mouth @ 10pm 90 tablet 3     SUMAtriptan (IMITREX) 25 MG tablet Take 1 tablet (25 mg) by mouth at onset of headache for migraine 30 tablet          Video-Visit Details    Type of service:  Video Visit    Video Start Time: 0  Video End Time: 0    Originating Location (pt. Location): Home    Distant Location (provider location):   [facility] NEUROLOGY     Platform used for Video Visit: Larissa Styles MD

## 2021-01-01 ENCOUNTER — HEALTH MAINTENANCE LETTER (OUTPATIENT)
Age: 76
End: 2021-01-01

## 2021-06-01 VITALS — BODY MASS INDEX: 23.56 KG/M2 | WEIGHT: 133 LBS

## 2021-06-16 NOTE — PROGRESS NOTES
Physical Therapy  Physical Therapy  Movement Disorder Treatment Note    Date: 3/20/2018   Visit #: 2/9   Medicare (Certification period 2/27/2018 - 5/25/2018)  Rx Units: 2-TE, 2- NR  Total OP Minutes: 60    Pain:  No    Subjective:  Patient presents for first follow-up visit and reports no significant changes since initial evaluation. Patient has performed BIG exercises in previous physical therapy sessions years ago but states she does not remember them and would benefit from reviewing them.     Objective:  Therapeutic Exercise:  Total Minutes: 30  Sustained Movements (sitting)  4reps  x 1-2 sets      Exercise   Reps   Sets   Effort     1A Floor to Ceiling With Flicking   8 1 8     1B Side to Side With Flicking  8 1 5-6      Comments:   1A: Patient educated on effort scale, with focus on producing large amplitude, powerful movements with each BIG exercise. Cues for increased PWR! Of UE movements and louder talking. Pt reliant on therapist demonstration for proper sequencing of exercise. Provided pt with handout and added to HEP.  1B: Patient required extensive cueing and shaping with this exercise and had difficulty coordinating UE and LE movements. Pt was able to obtain LE extension with increased time. Shaping for palm up and cueing for increased amplitude of finger flicking. Not added to HEP d/t pt relying heavily of therapist's demonstration and pt's lack of confidence with exercise. Will benefit from further practice.     Other:   NuStep lvl 6 x 7' (seat and arms 8)  Average SPM: 86 Average METS: 2.5  -cues to keep SPM > 85     -STS from low, soft chair with feet on blue foam x 12    -VCs and demonstration for forward dive with hands out in front of pt; pt pleasantly surprised she was able to stand up from low, soft chair     Neuro-Reeducation/Balance:  Total Minutes: 30  Multidirectional Repetitive Movements.  8-16reps  x 1-2 sets    Step and Reach:   UE support: No Chairs Nearby: No      Exercise   Reps    "Sets   Effort     2A Forward Step and Reach   10 2 8      2B Sideward Step and Reach        2C Backward Step and Reach        Comments:   2A: Cueing for increased PWR! With forward step and maintaining arms wide. Pt responded well to cueing and demonstrated increased PWR! With second set. Provided pt with handout and added to HEP.     Rock and Reach:  UE support:   Chairs Nearby:       Exercise   Reps   Sets   Effort     2D Left leg Forward/Backward          2D Right leg Forward/Backward        2E Side to Side        Comments:   Other:   NBOS on blue circular foam:   -EC 30\" x 2 -pt demonstrates forward LOB before 30\" ruchi on first set, requiring mod A from therapist to regain balance; moderate sway on second  set   -Horizontal head turns x 30\" -minimal sway    -Vertical head turns x 30\" -minimal sway    -Marching x 45\" -cues to progressively increase hip flexion     Toe taps onto 8\" step from blue circular foam B x 20   -no LOB or instability demonstrated; cues to step further back onto foam after tapping    Obstacle course: river rocks > balance beam > stepping over shoe boxes x 8    -VCs for sequencing, particularly taking only 1 step in between each segment of course and maintaining gait speed while approaching obstacles    -CGA with no LOB noted; pt would occasionally swing lagging LE around shoe box and would step down from beam to regain stability     Gait Training:   Total Minutes:     Time Speed (MPH) UE support Direction Incline Comments                                      Other:     Therapeutic Activity  Total Minutes:      Functional Movement  Rep  Sets  Effort    Rolling        Supine to Sit       Sit to Stand from standard chair       Sit to Stand from low, soft chair       Sit and Reach       Walk and Turn       PWR Moves:       PWR Moves:        Comments:     Big Walking:     Other:    Carryover Assignment: 1A, 2A     Assessment/Plan for week:  3/19/2018 - 3/23/2018  Patient tolerated initiation of " treatment interventions well this date. 1A/B and 2A BIG exercises were re-introduced to pt and 1A and 2A were assigned for pt to perform at home. She required extensive cueing and shaping but was motivated to learn exercises and perform them correctly. Pt would benefit from further review and initiation of additional BIG exercises in future sessions. Pt also performed well on balance interventions, with greatest imbalance demonstrated with eyes closed on foam. Continue with BIG exercises, functional strengthening, and high-level balance interventions to improve safe and efficient mobility.     Suzanne Tejeda, SPT  SPT under direct supervision of PT with PT directing treatment and plan of care.  Girish Diallo, PT/DPT

## 2021-06-16 NOTE — PROGRESS NOTES
Physical Therapy  Physical Therapy  Movement Disorders  Medicare Certification    Medical Diagnosis: Parkinson's Disease    Treatment Diagnosis: Postural instability, Gait instability    Referring MD: Nolvia Villaseñor MD  Onset Date: 1/24/2018  Start of Care: 2/27/2018    Assessment: Patient is a 73 y/o with referring diagnosis of Parkinson's Disease since 2012. Patient demonstrates impaired standing balance, scoring 19/28 on MiniBEST balance assessment, placing patient at an increased falls risk. She also exhibits decreased gait speed compared to age/gender norms and demonstrates gait deviations that hinder efficient mobility. Patient has received physical therapy from this facility in the past and demonstrated improvements in functional strength, balance, activity tolerance, and gait speed after episode of care. She is an active individual and expresses strong motivation to improve balance. Skilled 1:1 physical therapy is appropriate to address identified deficits, decrease falls risk, and improve functional mobility due to progressive nature of disease.     Prognostic Indicators: Rehabilitation Potential Good  Impairment: Balance, Motor Planning/Coordination and Postural/Gait Instability    Functional Goals to be met by 9 visits including initial evaluation   Patient will show improved efficiency and quality of movement, improved gait and postural stability for increased safety, decreased fall risk when performing ADL's, IADL's, household &/or community ambulation as measured by:     -Balance Assessment: >/= 23/28 on MiniBEST,    -Four Square Step Test < /= 10 seconds, demonstrating correct sequence without hitting dowels,   -Gait speed >/= 1.2m/second and # steps in 6 meters < 9 steps,    -Patient will ambulate > 2,000 feet in 6 minute with RPD< 2/10 to indicate improved functional activity tolerance for participating in social events &/or household/community ambulation.   -Patient will be mod I/SBA with  HEP.  Patient Functional Goal: Improve balance and strength   Goals were established with the patient: yes    Plan of Care  Communication with: referral Source and patient, Patient / Family / Instruction: Etiology of diagnosis or presented symtoms, Treatment plan/rationale, Home Exercise Program and Expected Functional Outcomes, Big/PWR Techniques, Therapeutic Exercise, Mobility / Transfer Training, Gait Training and Neuro Re-ed / Balance    Frequency/Duration 2x/week for up to 9 visits including initial evaluation.    MEDICARE PATIENTS:  [facility] # [id number]  Provider #   Certification Dates: from 2/27/2018 to 5/25/2018    Suzanne Tejeda, PT Student   SPT under direct supervision of PT with PT directing treatment and plan of care.  Girish Diallo, PT/DPT    Physician Recommendation:  1. I certify the need for these services furnished within this plan and while under my care. I agree with the therapist's recommendation for plan of care.    2. If there is any recommendation for modification of therapy plan, please indicate below.      Physician's Signature (Printed):  _____________________________

## 2021-06-17 NOTE — PROGRESS NOTES
Physical Therapy  Physical Therapy  Movement Disorder Treatment Note    Date: 4/10/2018   Visit #: 3/9   Medicare (Certification period 2/27/2018 - 5/25/2018)  Rx Units: 1-TE, - GT  Total OP Minutes: 25    Pain:  No    Subjective:  Patient reports being frustrated with [employer] this date because she is 30 min late to her appointment. Pt leaves PT early to catch her [employer] mobility bus after the session.    Objective:  Therapeutic Exercise:  Total Minutes: 10  Sustained Movements (sitting)  4reps  x 1-2 sets      Exercise   Reps   Sets   Effort     1A Floor to Ceiling With Flicking  4 1 8     1B Side to Side With Flicking  4 1 6      Comments:   1A and 1B:  Pt requires modeling and 25-50% shaping of both exercises.  She has difficulty with mirroring and gets easily confused with the exercises.  Constant cues to count loud. Some incoordination with flicking and counting.      Other:     -STS from low, soft chair with feet on blue foam x 12    -VCs and demonstration for forward dive with hands out in front of pt; pt pleasantly surprised she was able to stand up from low, soft chair     Neuro-Reeducation/Balance:  Total Minutes: 5  Multidirectional Repetitive Movements.  8-16reps  x 1-2 sets    Step and Reach:   UE support: No Chairs Nearby: No      Exercise   Reps   Sets   Effort     2A Forward Step and Reach   10 2 8      2B Sideward Step and Reach        2C Backward Step and Reach        Comments:   2A: Cueing for increased PWR! With forward step and maintaining arms wide. Pt responded well to cueing and demonstrated increased PWR! With second set. Provided pt with handout and added to HEP.     Rock and Reach:  UE support:   Chairs Nearby:       Exercise   Reps   Sets   Effort     2D Left leg Forward/Backward          2D Right leg Forward/Backward        2E Side to Side        Comments:   Other:       Gait Training:   Total Minutes: 10    Time Speed (MPH) UE support Direction Incline Comments   4:00 3.0  "Bilateral Forward 0% Cues to reach for heel strike and stay closer to the front of the treadmill.  RPE:  5/10   4:00 2.5 Bilateral Forward 5.0% RPE:  5/10   1:00 3.3 Bilateral Forward 0% Difficult to keep up with this pace, decreased L step length.  Cues to \"drive\" L LE forward.  Pt occ had to take on a jogging form to keep up.   1:00 2.5 Bilatearl Forward  0% Cues for tall posture    Other:     Therapeutic Activity  Total Minutes:      Functional Movement  Rep  Sets  Effort    Rolling        Supine to Sit       Sit to Stand from standard chair       Sit to Stand from low, soft chair       Sit and Reach       Walk and Turn       PWR Moves:       PWR Moves:        Comments:     Big Walking:     Other:    Carryover Assignment: 1A, 2A     Assessment/Plan for week:  4/19/2018 - 4/13/2018  Patient was well challenged on the treadmill this date and often made comments like, \"This is really hard.\" and \"I'm really trying.\".  The pt had a shortened session this date due to transportation issues.  During her brief time today, the pt had several questions regarding her POC and would ask, \"Why am I doing this (Physical Therapy)?\".  PT explained purposed of skilled 1:1 PT but she seemed confused at times, especially when in relation to how PT and the fitness groups differed.  In general, the pt's cognition seems effected as she can have difficulty with mirroring and following simple directions. PT to consider issuing the MOCA to determine best ways for communication and teaching.       "

## 2021-06-17 NOTE — PROGRESS NOTES
"Physical Therapy  Physical Therapy  Movement Disorder Treatment Note    Date: 4/12/2018   Visit #: 4/9   Medicare (Certification period 2/27/2018 - 5/25/2018)  Rx Units: 2-TA, 1-NR, 1-TE  Total OP Minutes: 55    Pain:  No    Subjective:  Pt is ready for therapy this date with no major complaints.  The  reports confusion to this author regarding checking in and where PT would find the pt.    Objective:  Therapeutic Exercise:  Total Minutes: 15  Sustained Movements (sitting)  4reps  x 1-2 sets      Exercise   Reps   Sets   Effort     1A Floor to Ceiling With Flicking        1B Side to Side With Flicking         Comments:     Other:     -NuStep x 12 min, level #3, avg METS:  2.6, SPM:  61    Neuro-Reeducation/Balance:  Total Minutes: 15  Multidirectional Repetitive Movements.  8-16reps  x 1-2 sets    Step and Reach:   UE support: No Chairs Nearby: No      Exercise   Reps   Sets   Effort     2A Forward Step and Reach        2B Sideward Step and Reach        2C Backward Step and Reach        Comments:       Rock and Reach:  UE support:   Chairs Nearby:       Exercise   Reps   Sets   Effort     2D Left leg Forward/Backward          2D Right leg Forward/Backward        2E Side to Side        Comments:   Other:     -Coordination/Balance Drills   -5 12\" hurdles and foam discs placed in between them    -Forward stepping over the hurdles, reciprocally.  Cues for high steps and to avoid hip circumduction.  Pt completed 6 trials with 3 trials pt striking 1-2 hurdles each.      -Sidestepping R and L x 3 reps in each direction.  Pt had difficulty coordinating and initiating task.  Pt had greater difficulty this date when leading with her L LE.  Overall SBA-CGA but no LOB.    -Additional time required for tactile and visual cues to complete task    Gait Training:   Total Minutes:     Time Speed (MPH) UE support Direction Incline Comments                                    Other:     Therapeutic Activity  Total Minutes: " "25     Functional Movement  Rep  Sets  Effort    Rolling        Supine to Sit       Sit to Stand from standard chair       Sit to Stand from low, soft chair       Sit and Reach       Walk and Turn       PWR Moves:       PWR Moves:        Comments:     Other:  -Pt was issued the MOCA this date as a cognitive assessment tool, given that the pt's history at this clinic does not include a basic cognitive assessment and there appears to be obvious MCI when speaking with her and that she can become easily confused.  Upon completion of the MOCA, this author scored the pt at 17/30 (this score was further verified by an OT that regularly performs the MOCA and is knowledgeable of the test).  A 17/30 is indicative of significant cognitive impairment.  At this score, a pt typically does well with a routine but would struggle with something out of the ordinary (difficulty with problem solving; new medication management).  The MOCA was administered to identify what aspects of cognition was challenging for the pt (i.e. Visuospatial, memory) and to determine if there are ways to optimize communication and facilitate learning given the pt's cognitive capacity. The pt did have the greatest difficulties with visuospatial, attention, language, and delayed recall/memory.  The PT reviewed the test with her at the pt's request, to which the pt somewhat perseverated on her low score and asked \"if I take it again, can I do better?\"  This author explained that the purpose of this test was to establish a baseline and learn best communication processes and manage expectations with PT.      Carryover Assignment: 1A, 2A     Assessment/Plan for week:  4/9/2018 - 4/13/2018  The pt was administered the MOCA this date to reveal possible significant cognitive impairment though additional testing by another profession is warranted for further assessment.  This author will attempt to contact the daughter to discuss pt's living situation, given the pt " does live alone and if they would like to pursue additional testing.  From a PT perspective, given visuospatial was challenging, modeling with the BIG exercises does not appear to be a good treatment strategy.  PT, this date, was focused on balance and coordination drills.  This, too, proved challenging for the pt when it came to direction following.  The pt is physically able to perform the task but her cognition is a barrier.  The pt has only 3 remaining sessions.  Plan to complete those sessions with emphasis on simple cueing and incorporating some visual and tactile cues to reduce cognitive overload and to make progress towards the pt's LTGs.

## 2021-06-17 NOTE — PROGRESS NOTES
"Physical Therapy  Physical Therapy  Movement Disorder Treatment Note    Date: 4/17/2018   Visit #: 5/9   Medicare (Certification period 2/27/2018 - 5/25/2018)  Rx Units: 1-GT, 3-NR  Total OP Minutes: 55    Pain:  No    Subjective:  No main complaints this date    Objective:  Therapeutic Exercise:  Total Minutes:   Sustained Movements (sitting)  4reps  x 1-2 sets      Exercise   Reps   Sets   Effort     1A Floor to Ceiling With Flicking        1B Side to Side With Flicking         Comments:     Other:         Neuro-Reeducation/Balance:  Total Minutes: 43  Multidirectional Repetitive Movements.  8-16reps  x 1-2 sets    Step and Reach:   UE support: No Chairs Nearby: No      Exercise   Reps   Sets   Effort     2A Forward Step and Reach        2B Sideward Step and Reach        2C Backward Step and Reach        Comments:       Rock and Reach:  UE support:   Chairs Nearby:       Exercise   Reps   Sets   Effort     2D Left leg Forward/Backward          2D Right leg Forward/Backward        2E Side to Side        Comments:   Other:     -Coordination/Balance Drills   -3 12\" hurdles and large river rocks placed in between them    -Forward stepping over the hurdles, reciprocally.  Cues for high steps and to avoid hip circumduction.  Pt completed 6 trials with 3 trials pt striking 1-2 hurdles each.      -Sidestepping R and L x 3 reps in each direction.  Pt had difficulty coordinating and initiating task.  Pt had greater difficulty this date when leading with her L LE.  Overall SBA-CGA with pt demo'ing several LOB but able to recover on own.  Pt was confused as to how to complete the task.      -Additional time required for tactile and visual cues to complete task   -Stepping on yellow foam and surrounded by 4 other foam discs for pt to step forward, backward, R & L sidestep; x 5 min total.  Cues to step big onto each foam.  Pt had greatest difficulty when stepping backwards with her L LE often crossing midline and her R LE not " "taking a big enough step onto the foam.  Cues for a more deliberate and bigger step with her R LE.  This improved with repetition. No LOB.   -R and L sidestepping x 50' without difficulty.   -R and L grapevine 2 x 50' ea.  Significant difficulty coordinating task.  With visual mirroring, pt able to complete task with R grapevine but relied 100% on mirroring; once PT stopped doing the task with her, the pt could no longer perform correctly.  Leading with the L LE was very challenging for the pt and despite various ways of instruct, pt could ultimately not coordinate the task.    Gait Training:   Total Minutes: 12    Time Speed (MPH) UE support Direction Incline Comments   4:00 3.0 Bilateral Forward 0 Pt had some difficulty keeping up with the pace.  Cues for long steps and tall posture   4:00 2.5 Bilateral Forward 7.0% Cues for tall posture   1:00 3.3 Bilateral Forward 0 Cues for eyes up and long steps.  Increased UE reliance and cues to stay close to the front of the treadmill.   1:00 2.5 Bilateral Forward 0       Other:     Therapeutic Activity  Total Minutes:      Functional Movement  Rep  Sets  Effort    Rolling        Supine to Sit       Sit to Stand from standard chair       Sit to Stand from low, soft chair       Sit and Reach       Walk and Turn       PWR Moves:       PWR Moves:        Comments:     Other:    Carryover Assignment: 1A, 2A     Assessment/Plan for week:  4/16/2018 - 4/20/2018  The pt did well with very basic stepping strategies but was challenged with more complex coordination tasks that included \"grapevine\" activities.  Pt continues to become easily confused and takes additional time to orient to a basic task.  This author has concern regarding the pt living on her own and an attempt has been made to contact the daughter but the phone number was incorrect, so the pt was able to give the correct number.  Plan to complete remaining sessions with emphasis on simple cueing and incorporating some " visual and tactile cues to reduce cognitive overload and to make progress towards the pt's LTGs.

## 2021-06-19 NOTE — PROGRESS NOTES
Writer spoke with Minda Poe who recommended talking with daughter Rosalva to relay the same transportation resources. Writer spoke with Rosalva and provided phone numbers and information on some of the resources. Rosalva and Colleen will look into the transportation options and will be in touch.    Norah Stewart, Social Work Intern  8/6/18 1650    I have read, discussed, and agree with the documentation as presented by Norah Stewart, Social Work Intern.    Lois Minor, [facility]  8/6/18 1650

## 2021-06-19 NOTE — PROGRESS NOTES
Occupational Therapy  Occupational Therapy    Medicare Insurance    Units: 1 Eval, 1 ADL  Total Minutes: 75    Medical Diagnosis: Parkinson's Disease  Treatment Diagnosis: Cognitive Impairment  Referring Practitioner: Dr. Fletcher Spencer  Date of onset: 7-2-18  Start of care: July 18, 2018    Ms.Susan LINDA Mercado was referred by Fletcher Spencer MD for an occupational therapy outpatient cognitive evaluation. The assessments used in this evaluation will help determine if cognitive impairment is present and if so, how functional daily activity may be affected.  Following the assessments, the occupational therapist may offer education and recommendations to assist caregivers in providing the optimal level of support for their loved one. Paige was seen on 7/18/2018 and was accompanied by her daughter.  Paige presented as nicely groomed and dressed and ambulated independently. She is very familiar with [facility], having participated in the [facility] PT sessions and Parkinson's exercise classes many times over the years. She initially stated she did not want to participate in this evaluation, but was agreeable to completing the CPT. She indicated she was anxious or nervous and stated her tremors increased with this feeling of anxiety.     Pain: none stated    OBJECTIVE  Results of assessments are as follows:    CPT: 4.7 /5.6      The above assessment scores indicate a Moderate  level of impairment.     ASSESSMENT  Recommendations:  Cognitive functioning recommendations: 4.6-4.8: The assessment scores indicate a recommendation for an assisted living environment with daily check-in supervision (i.e. at home with assistance or assisted living facility). Assistance with managing medications and finances is recommended as Paige may have increased difficulty with complex problem solving. Learning new skills and information may be very challenging for Paige but she may be able to do so with a lot of repetition. Problem solving is  often challenging especially when it is not anticipated or expected. Paige may lack insight into her deficits indicating an increased need for assistance with complex tasks requiring accuracy for safety. Supervision is recommended for Paige when using hot tools and major appliances during meal preparation. Driving is not recommended for Paige due to difficulty with divided attention and complex problem solving. Further, Paige may benefit from increased structure throughout the day creating healthy habits and routines and eliminating her need to problem solve from one task to another.    Following the evaluation, I reviewed the results with Paige and her daughter. We discussed the recommendations for assistance with complex tasks, such as managing medications and finances, as well as meal preparation. She is receiving assistance with all of these tasks at home. I explained the difficulty she may experience with tasks involving a lot of details, or time pressures, as well as how to accommodate difficulties with learning new information. She was receptive to these discussions and admitted that she recognized the difficulty she was having in some of these areas. We also discussed that there may be some difficulty in transferring new learnings into a new environment, which she stated happened to her recently going to a friend's cabin. Her daughter asked how to make a trip to a cabin more successful- we discussed simplifying the environment. This can be done in having fewer people around, less stimulation and establishing a structure or routine. Also, it will be helpful to have her family and friends have an awareness of what she is experiencing. Lastly, we discussed her interest in returning to driving. I recommended that she not return to driving, but a formal 's evaluation would be appropriate if she pursues her interest in returning. I explained, generally, what a comprehensive 's evaluation entails and  what the outcomes can mean in terms of her ability to resume driving legally or to legally lose her license. Both she and her daughter verbalized understanding and all questions for OT were answered at this time.     PLAN  Goal: Patient and/or family will verbalize understanding of recommendations regarding instrumental activities of daily living and activities of daily living.    Plan of Care: Patient/ Family instruction: Expected Functional Outcomes     Thank you for this referral.  If I can be of further assistance, please do not hesitate to contact me.    MEDICARE PATIENT: Yes: HCIN #[id number]; Provider # ; Certification Dates: from 7-18-18 to 7-18-18    Physician Recommendation:  1. I certify the need for these services furnished within this plan and while under my care. I agree with the therapist's recommendation for plan of care.  2. If there is any recommendation for modification of therapy plan, please indicate below.    Andreea Perez, OT

## 2021-06-19 NOTE — PROGRESS NOTES
.OUT PATIENT-CLINICAL SOCIAL WORK PROGRESS NOTE      8/6/2018         DATA:  Writer did not hear back from Colleen's daughter. Writer spoke with Colleen who confirmed I had the wrong phone number, her daughter Rosalva's phone is [phone] which writer updated in demographics. Colleen and writer discussed transportation resources available (S  [employer] , [employer], [employer], and [employer]), and Colleen requested information be mailed to her home. Colleen plans on discussing the options with her daughter and advised no need to contact Rosalva at this time.     ASSESSMENT: Colleen appeared to have done a lot of research re: transportation already and was appreciative of the resources.    PLAN: Writer mailed transportation resources and will touch base with Colleen next week when she is in-clinic to see if any of the resources were helpful. Writer provided my contact information should Colleen have questions in the meantime.    Type of Service: Phone Call    Services Provided: Resources    Resources Provided: Transportation    Norah Stewart, Social Work Intern  8/6/18 8938    I have read, discussed, and agree with the documentation as presented by Norah Stewart, Social Work Intern.    Lois Minor, [facility]  8/6/18 5860

## 2021-06-19 NOTE — PROGRESS NOTES
.OUT PATIENT-CLINICAL SOCIAL WORK PROGRESS NOTE      7/2/2018         DATA:  Paige Mercado is a 72 y.o. female with cognitive disorder NOS, delirium, Parkinson's disease, depression, and anxiety per medical record. Patient was referred by Hansa in PT and presents with her daughter Ibis for initial cognitive evaluation and consult. [facility] [place] and that her daughter cooks for her often. Patient and daughter report that Patient does well with her ADLs and their main concern is regarding confusion, difficulty with word finding, and difficulty with numbers/calendars. 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    ASSESSMENT: Patient and daughter a re pleasant and engaging with questions and comments.       PLAN: Writer explained social work's role and provided contact information should resources and referrals be helpful. Social work will continue to be available.     Type of Service: Office Visit    Services Provided: Assessment    Resources Provided: None needed at this time    Norah Stewart, Social Work Intern  7/2/18 1631    I have read, discussed, and agree with the documentation as presented by Norah Stewart, Social Work Intern.    Lois Minor, [facility]  7/2/18 1634

## 2021-06-19 NOTE — PROGRESS NOTES
[facility] Outpatient Consultation    Assessment / Impression:   1.  Cognitive disorder NOS  2.  Delirium  3.  Parkinson's disease  4.  Autonomic insufficiency secondary to #3  5.  Hypertension  6.  History of UTIs complicated by delirium  7.  Status post right mastoid surgery  8.  Status post TNA, hysterectomy  9.  Depression and anxiety NOS    Plan:   1.  Screening blood work  2.  Review recently performed CT of the brain  3.  OT evaluation    A long conversation held with the patient and daughter Ibis in attendance.  This patient with a greater than 6 year history of Parkinson's disease has developed persistent confusion following a urinary tract infection or series of urinary tract infections treated in early to mid 2017.  The patient on exam today continues to demonstrate significant inattention likely in large part secondary to neurodegeneration.  I discussed with the patient and daughter extensively her illness and management strategies focusing on areas including environmental support and wellness.  At this point in time the patient is not driving but she does wish to remain independent in her apartment.  I expressed to the patient the reality that I would only be providing her with recommendations regarding environmental support.    Total time for evaluation one and one half hours with the majority time spent in counseling.  All questions answered.    Subjective:   HPI: Paige Mercado is a 72 y.o. female with above-noted diagnoses who presents for further cognitive evaluation.  The patient is accompanied by her only child Ibis who reports that she believed her mother to be cognitively within normal limits until undergoing right mastoid surgery 7 or 8 years ago.  Surgery was followed by a short period of confusion that appeared to resolve completely after several days.  Remaining independent in her townhome, Paige apparently developed more persistent delirium following a series of urinary tract  infections initially diagnosed in early 2017.  Eventually initiated on therapy with rivastigmine, Ibis notes that her mother has made significant improvement in her cognitive and functional capabilities but they have not returned to normal with persistent confusion being noted.  The patient is also been treated for anxiety and depression with the symptoms felt to be under acceptable control at this time.    Presently there is some interest in further consultation regarding these difficulties and appropriate forms of management.    The patient presents as an elderly female who demonstrates stigmata of Parkinson's disease.  Seated for evaluation, I noted an occasional left upper extremity resting tremor.  When anxious, bilateral upper extremity tremors are noted.  In addition, the patient demonstrates masking of the face and some hypophonia with mild indistinctness to speech.  Presently the patient does report what would likely be best described as internal tremors.  She also complains of persistent fatigue, occasional orthostasis, road glare intolerance, and persistent constipation.  She did experience at least one episode of well-formed visual hallucinations while being treated for a urinary tract infection.  She has been noted to have experienced a 40 pound weight loss in the past year.    Past Medical History:   As above    Social History:   Born in [place], the patient completed high school education as well as college obtaining a degree in counseling.   for 23 years, the patient is currently .  She has been residing in a [facility] for 10 years and is noted by the daughter to be completely independent with respect to ADL as well as IADL responsibilities.    Past Psychiatric History:   Remarkable for anxiety and depression for which she was hospitalized many years ago.  There is otherwise no history of mental illness or chemical dependency.    Family History:   Negative for neurodegenerative  disease as well as mental illness.    Review of Systems:  Pertinent items are noted in HPI.    Objective:   As above    Vitals:    07/02/18 1034   BP: 128/63   Pulse: 91   Weight: 133 lb (60.3 kg)     Physical Exam:    Skin pale dry with no rashes noted.  Only minimal lower extremity edema noted.  Extremities are warm to palpation.  HEENT exam reveals head to be normocephalic.  Eyes with intact fields.  Forward gaze is conjugate.  Pursuit movements are intact aside from some slight right lateral gaze nystagmus.  EOMs reveal significant restriction in upgaze but no restriction in down gaze.  Saccadic velocities are normal but amplitude is reduced.  Hearing is adequate.  Face is symmetrical.  Oropharynx with no retained oral secretions or drooling.  Palatal lift is symmetrical.  Lungs are clear to auscultation but with diminished breath sounds bilaterally.  Abdomen soft nontender.    Neurological Exam:     As above    Cognitive Evaluation:     Patient is neatly groomed casually dressed and seated for evaluation.  I note her to have difficulty with attentional function she tends to lose her train of thought and because she has difficulty performing working memory as well as cognitive set shifting task.  Cognitive processing speed is reduced with a latency of response noted.  As noted speech is hypophonic with poor projection noted.  Articulation is a bit impaired with some indistinctness to speech noted.  The patient does lose her train of thought during the course of conversation.  Some impairment in insight is noted.  Short-term memory impairment noted including what appears to be retrieval deficit.  Affect is pleasant and mood congruent.    Medications:  Scheduled Meds:  Continuous Infusions:  PRN Meds:.    Fletcher Spencer MD  7/2/2018

## 2021-06-19 NOTE — PROGRESS NOTES
.OUT PATIENT-CLINICAL SOCIAL WORK PROGRESS NOTE      8/3/2018           DATA: Colleen's daughter requested transportation resources as [employer] has been unreliable transportation for her.     ASSESSMENT: N/A    PLAN: Writer LVM for Colleen's daughter, awaiting call back.    Type of Service: Phone Call    Services Provided: Resources    Resources Provided: Will offer Transportation resources      Norah Stewart, Social Work Intern  8/3/18 1363    I have read, discussed, and agree with the documentation as presented by Norah Stewart, Social Work Intern.    Lois Minor, [facility]  8/3/18 6684

## 2021-06-26 NOTE — PROGRESS NOTES
Progress Notes by Irma Tate PT Student at 4/19/2018 10:04 AM     Author: Irma Tate PT Student Service: -- Author Type: PT Student    Filed: 4/19/2018 12:48 PM Date of Service: 4/19/2018 10:04 AM Status: Attested    : Irma Tate PT Student (PT Student) Cosigner: Girish Diallo PT at 4/19/2018  4:13 PM    Attestation signed by Girish Diallo PT at 4/19/2018  4:13 PM    SPT under direct supervision of PT with PT directing treatment and plan of care.  Girish Diallo, PT/DPT                Physical Therapy  Physical Therapy  Movement Disorder Treatment Note    Date: 4/19/2018   Visit #: 6/9   Medicare (Certification period 2/27/2018 - 5/25/2018)  Rx Units: 2 TE, 2 NR  Total OP Minutes: 55    Pain:  No    Subjective:  Pt reports things have been going fine at home with no significant changes.  She would like an exercise program before she is done so she can keep up with her exercising.      Objective:  Therapeutic Exercise:  Total Minutes: 25  Sustained Movements (sitting)  4reps  x 1-2 sets      Exercise   Reps   Sets   Effort     1A Floor to Ceiling With Flicking        1B Side to Side With Flicking         Comments:     Other:     NuStep x 10 min, work load 5, avg METS:  2.6, SPM:  98    Monster Walk:   - 50' x 1 with each LE leading and peach theraband; verbal cues and modeling to keep toes pointed forward and maintain squat position     Alternating Walking Lunges:  - 25' x1; verbal cues and modeling for proper technique; pt had difficulty with coordinating alternate lunges; exercise terminated due to causing pain in pt's R hip.     sit to stands from green cushioned chair while standing on red foam:   -15; verbal cues for slow controlled descent   -12 x 2 with shoulder press using 3 pound weight; verbal cues for straight arms at the end of the press     Neuro-Reeducation/Balance:  Total Minutes: 30  Multidirectional Repetitive Movements.  8-16reps  x 1-2 sets    Step and Reach:   UE  support: No Chairs Nearby: No      Exercise   Reps   Sets   Effort     2A Forward Step and Reach        2B Sideward Step and Reach        2C Backward Step and Reach        Comments:       Rock and Reach:  UE support:   Chairs Nearby:       Exercise   Reps   Sets   Effort     2D Left leg Forward/Backward          2D Right leg Forward/Backward        2E Side to Side        Comments:   Other:     Red Foam:  -playing catch with yellow ball, feet together: x 2 min; verbal cues for maintaining good upright posture  -catching ball outside HEYDI, feet together: x 2 min- increased unsteadiness  -tossing/catching ball behind pt's back: x 20 each direction; Verbal cues for increased rotation     Obstacle Course: (red and blue foam, balance beam and 4 hurdles)-Verbal cues for making sure to step over sunil not around sunil    -forward walking x 3: noted decreased gait speed when coming up to a sunil to step over  -side stepping leading with each LE x 2; verbal cues for keeping trunk and toes facing the wall;      Backward ambulation  - 50' x 4 while playing catch: verbal cues for increased step and gait speed; increased unsteadiness when trying to increase speed          Gait Training:   Total Minutes:     Time Speed (MPH) UE support Direction Incline Comments                                    Other:     Therapeutic Activity  Total Minutes:     Functional Movement  Rep  Sets  Effort    Rolling        Supine to Sit       Sit to Stand from standard chair       Sit to Stand from low, soft chair       Sit and Reach       Walk and Turn       PWR Moves:       PWR Moves:        Comments:     Other:      Carryover Assignment: 1A, 2A     Assessment/Plan for week: 4/16/2018-4/20/2018  Patient tolerated session well overall but was challenged with the addition of foam for balance activities and dual task activities. She required extensive cueing and modeling throughout session to understand exercises and make sure she was doing them  with proper technique. Pt has one more session left and would like a list of exercises so she can continue doing some on her own. Plan to incorporate functional strengthening exercises and balance interventions pt can safely do at home, as well as, complete discharge re-assessment.    Irma Tate, PT Student
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
